# Patient Record
Sex: MALE | Race: WHITE | NOT HISPANIC OR LATINO | Employment: FULL TIME | URBAN - METROPOLITAN AREA
[De-identification: names, ages, dates, MRNs, and addresses within clinical notes are randomized per-mention and may not be internally consistent; named-entity substitution may affect disease eponyms.]

---

## 2018-08-01 ENCOUNTER — OFFICE VISIT (OUTPATIENT)
Dept: URGENT CARE | Facility: URGENT CARE | Age: 48
End: 2018-08-01
Payer: COMMERCIAL

## 2018-08-01 VITALS
HEART RATE: 81 BPM | SYSTOLIC BLOOD PRESSURE: 132 MMHG | DIASTOLIC BLOOD PRESSURE: 72 MMHG | TEMPERATURE: 98.7 F | OXYGEN SATURATION: 97 %

## 2018-08-01 DIAGNOSIS — G89.29 CHRONIC PAIN OF RIGHT KNEE: Primary | ICD-10-CM

## 2018-08-01 DIAGNOSIS — M25.561 CHRONIC PAIN OF RIGHT KNEE: Primary | ICD-10-CM

## 2018-08-01 PROCEDURE — 99203 OFFICE O/P NEW LOW 30 MIN: CPT | Performed by: PHYSICIAN ASSISTANT

## 2018-08-01 RX ORDER — TRAMADOL HYDROCHLORIDE 50 MG/1
50 TABLET ORAL AT BEDTIME
Qty: 10 TABLET | Refills: 0 | Status: SHIPPED | OUTPATIENT
Start: 2018-08-01 | End: 2018-08-01

## 2018-08-01 RX ORDER — HYDROCODONE BITARTRATE AND ACETAMINOPHEN 5; 325 MG/1; MG/1
1 TABLET ORAL AT BEDTIME
Qty: 12 TABLET | Refills: 0 | Status: SHIPPED | OUTPATIENT
Start: 2018-08-01 | End: 2019-12-16

## 2018-08-01 NOTE — MR AVS SNAPSHOT
"              After Visit Summary   2018    Kemal Rodriguez    MRN: [id number]           Patient Information     Date Of Birth          1970        Visit Information        Provider Department      2018 2:05 PM Gaston Lange PA-C [facility]        Today's Diagnoses     Chronic pain of right knee    -  1       Follow-ups after your visit        Who to contact     If you have questions or need follow up information about today's clinic visit or your schedule please contact [facility]RG[facility] directly at [phone].  Normal or non-critical lab and imaging results will be communicated to you by [employer], letter or phone within 4 business days after the clinic has received the results. If you do not hear from us within 7 days, please contact the clinic through [employer] or phone. If you have a critical or abnormal lab result, we will notify you by phone as soon as possible.  Submit refill requests through [employer] or call your pharmacy and they will forward the refill request to us. Please allow 3 business days for your refill to be completed.          Additional Information About Your Visit        MyChart Information     [employer] lets you send messages to your doctor, view your test results, renew your prescriptions, schedule appointments and more. To sign up, go to [email or web address] . Click on \"Log in\" on the left side of the screen, which will take you to the Welcome page. Then click on \"Sign up Now\" on the right side of the page.     You will be asked to enter the access code listed below, as well as some personal information. Please follow the directions to create your username and password.     Your access code is: [email or web address]  Expires: 10/30/2018  3:20 PM     Your access code will  in 90 days. If you need help or a new code, please call your [place] clinic or [phone].        Care EveryWhere ID     This is your Care EveryWhere ID. This could be " used by other organizations to access your [place] medical records  [id number]        Your Vitals Were     Pulse Temperature Pulse Oximetry             81 98.7  F (37.1  C) (Tympanic) 97%          Blood Pressure from Last 3 Encounters:   08/01/18 132/72   02/11/15 139/75    Weight from Last 3 Encounters:   No data found for Wt              Today, you had the following     No orders found for display         Today's Medication Changes          These changes are accurate as of 8/1/18  3:20 PM.  If you have any questions, ask your nurse or doctor.               Start taking these medicines.        Dose/Directions    order for DME   Used for:  Chronic pain of right knee        Equipment being ordered: knee sleeve   Quantity:  1 Device   Refills:  0         These medicines have changed or have updated prescriptions.        Dose/Directions    * HYDROcodone-acetaminophen 5-325 MG per tablet   Commonly known as:  NORCO   This may have changed:  Another medication with the same name was added. Make sure you understand how and when to take each.        Dose:  1-2 tablet   Take 1-2 tablets by mouth every 4 hours as needed for moderate to severe pain   Quantity:  20 tablet   Refills:  0       * HYDROcodone-acetaminophen 5-325 MG per tablet   Commonly known as:  NORCO   This may have changed:  You were already taking a medication with the same name, and this prescription was added. Make sure you understand how and when to take each.   Used for:  Chronic pain of right knee        Dose:  1 tablet   Take 1 tablet by mouth At Bedtime   Quantity:  12 tablet   Refills:  0       * Notice:  This list has 2 medication(s) that are the same as other medications prescribed for you. Read the directions carefully, and ask your doctor or other care provider to review them with you.         Where to get your medicines      Some of these will need a paper prescription and others can be bought over the counter.  Ask your nurse if you have  questions.     Bring a paper prescription for each of these medications     HYDROcodone-acetaminophen 5-325 MG per tablet    order for DME               Information about OPIOIDS     PRESCRIPTION OPIOIDS: WHAT YOU NEED TO KNOW   We gave you an opioid (narcotic) pain medicine. It is important to manage your pain, but opioids are not always the best choice. You should first try all the other options your care team gave you. Take this medicine for as short a time (and as few doses) as possible.     These medicines have risks:    DO NOT drive when on new or higher doses of pain medicine. These medicines can affect your alertness and reaction times, and you could be arrested for driving under the influence (DUI). If you need to use opioids long-term, talk to your care team about driving.    DO NOT operate heave machinery    DO NOT do any other dangerous activities while taking these medicines.     DO NOT drink any alcohol while taking these medicines.      If the opioid prescribed includes acetaminophen, DO NOT take with any other medicines that contain acetaminophen. Read all labels carefully. Look for the word  acetaminophen  or  Tylenol.  Ask your pharmacist if you have questions or are unsure.    You can get addicted to pain medicines, especially if you have a history of addiction (chemical, alcohol or substance dependence). Talk to your care team about ways to reduce this risk.    Store your pills in a secure place, locked if possible. We will not replace any lost or stolen medicine. If you don t finish your medicine, please throw away (dispose) as directed by your pharmacist. The [place] Pollution Control Agency has more information about safe disposal: https://www.pca.state.[place]/living-green/managing-unwanted-medications.     All opioids tend to cause constipation. Drink plenty of water and eat foods that have a lot of fiber, such as fruits, vegetables, prune juice, apple juice and high-fiber cereal. Take a  laxative (Miralax, milk of magnesia, Colace, Senna) if you don t move your bowels at least every other day.          Primary Care Provider Office Phone # Fax #    Juanita Sorenson, KEYUR [phone]       [place] [address] [address] [facility] [address]        Equal Access to Services     [facility]: Hadii aad ku hadasho Soomaali, waaxda luqadaha, qaybta kaalmada adeegyada, waxay idiin hayaan adeeg kharash la'aan . So [facility] [phone].    ATENCIÓN: Si habla español, tiene a dumont disposición servicios gratuitos de asistencia lingüística. Elenaame al [phone].    We comply with applicable federal civil rights laws and [place] laws. We do not discriminate on the basis of race, color, national origin, age, disability, sex, sexual orientation, or gender identity.            Thank you!     Thank you for choosing [facility] URGENT [facility]  for your care. Our goal is always to provide you with excellent care. Hearing back from our patients is one way we can continue to improve our services. Please take a few minutes to complete the written survey that you may receive in the mail after your visit with us. Thank you!             Your Updated Medication List - Protect others around you: Learn how to safely use, store and throw away your medicines at www.disposemymeds.org.          This list is accurate as of 8/1/18  3:20 PM.  Always use your most recent med list.                   Brand Name Dispense Instructions for use Diagnosis    atenolol 50 MG tablet    TENORMIN     Take 50 mg by mouth daily        * HYDROcodone-acetaminophen 5-325 MG per tablet    NORCO    20 tablet    Take 1-2 tablets by mouth every 4 hours as needed for moderate to severe pain        * HYDROcodone-acetaminophen 5-325 MG per tablet    NORCO    12 tablet    Take 1 tablet by mouth At Bedtime    Chronic pain of right knee       order for DME     1 Device    Equipment being ordered: knee sleeve    Chronic pain of right knee        TERAZOSIN HCL PO      Take 1 mg by mouth        * Notice:  This list has 2 medication(s) that are the same as other medications prescribed for you. Read the directions carefully, and ask your doctor or other care provider to review them with you.

## 2018-08-01 NOTE — PROGRESS NOTES
"SUBJECTIVE:  Chief Complaint   Patient presents with     Urgent Care     Musculoskeletal Problem     right knee has been bothering patient, did something on saturday     Kemal Rodriguez is a 48 year old male presents with a chief complaint of right acute on chronic pain.  Pain is exacerbated by work, asMr. Michael works as a , making short trips that requires frequently getting in and out of his truck.     Patient is unable to sleep due to pain.  Has follow up with Ortho on the 16th.  Medication from PCP (Tramadol) is ineffective.  Would like pain medication to get through the next several nights.     [place] PM is emoty with the exception of the Tramadol reported by the patient      No past medical history on file.  Current Outpatient Prescriptions   Medication Sig Dispense Refill     atenolol (TENORMIN) 50 MG tablet Take 50 mg by mouth daily       HYDROcodone-acetaminophen (NORCO) 5-325 MG per tablet Take 1 tablet by mouth At Bedtime 12 tablet 0     HYDROcodone-acetaminophen (NORCO) 5-325 MG per tablet Take 1-2 tablets by mouth every 4 hours as needed for moderate to severe pain 20 tablet 0     order for DME Equipment being ordered: knee sleeve 1 Device 0     TERAZOSIN HCL PO Take 1 mg by mouth       Social History   Substance Use Topics     Smoking status: Unknown If Ever Smoked     Smokeless tobacco: Current User      Comment: \"I Chew a lot\"     Alcohol use No       ROS:  Review of systems negative except as stated above.    EXAM:   /72 (BP Location: Right arm, Patient Position: Chair, Cuff Size: Adult Regular)  Pulse 81  Temp 98.7  F (37.1  C) (Tympanic)  SpO2 97%  Gen: healthy,alert,no distress  Extremity: knee has swelling, point tenderness at lateral and medial aspects and FROM.   There is not compromise to the distal circulation.  Pulses are +2 and CRT is brisk  GENERAL APPEARANCE: healthy, alert and no distress  CHEST: clear to auscultation  CV: regular rate and " rhythm  EXTREMITIES: peripheral pulses normal  SKIN: no suspicious lesions or rashes  NEURO: Normal strength and tone, sensory exam grossly normal, mentation intact and speech normal    X-RAY was not done.    ASSESSMENT:   (M25.561,  G89.29) Chronic pain of right knee  (primary encounter diagnosis)  Plan: HYDROcodone-acetaminophen (NORCO) 5-325 MG per         tablet, order for DME (knee sleeve)  Follow up as scheduled

## 2019-12-16 ENCOUNTER — APPOINTMENT (OUTPATIENT)
Dept: ULTRASOUND IMAGING | Facility: CLINIC | Age: 49
End: 2019-12-16
Attending: EMERGENCY MEDICINE
Payer: COMMERCIAL

## 2019-12-16 ENCOUNTER — HOSPITAL ENCOUNTER (EMERGENCY)
Facility: CLINIC | Age: 49
Discharge: HOME | End: 2019-12-16
Attending: EMERGENCY MEDICINE | Admitting: EMERGENCY MEDICINE
Payer: COMMERCIAL

## 2019-12-16 VITALS
HEART RATE: 66 BPM | DIASTOLIC BLOOD PRESSURE: 85 MMHG | WEIGHT: 265 LBS | BODY MASS INDEX: 35.89 KG/M2 | OXYGEN SATURATION: 99 % | RESPIRATION RATE: 20 BRPM | SYSTOLIC BLOOD PRESSURE: 136 MMHG | TEMPERATURE: 98.4 F | HEIGHT: 72 IN

## 2019-12-16 DIAGNOSIS — K80.50 BILIARY COLIC: ICD-10-CM

## 2019-12-16 LAB
ALBUMIN SERPL-MCNC: 3.9 G/DL (ref 3.4–5)
ALP SERPL-CCNC: 96 U/L (ref 40–150)
ALT SERPL W P-5'-P-CCNC: 209 U/L (ref 0–70)
ANION GAP SERPL CALCULATED.3IONS-SCNC: 4 MMOL/L (ref 3–14)
AST SERPL W P-5'-P-CCNC: 253 U/L (ref 0–45)
BASOPHILS # BLD AUTO: 0.1 10E9/L (ref 0–0.2)
BASOPHILS NFR BLD AUTO: 0.9 %
BILIRUB SERPL-MCNC: 7.5 MG/DL (ref 0.2–1.3)
BUN SERPL-MCNC: 8 MG/DL (ref 7–30)
CALCIUM SERPL-MCNC: 8.5 MG/DL (ref 8.5–10.1)
CHLORIDE SERPL-SCNC: 106 MMOL/L (ref 94–109)
CO2 SERPL-SCNC: 27 MMOL/L (ref 20–32)
CREAT SERPL-MCNC: 0.76 MG/DL (ref 0.66–1.25)
DIFFERENTIAL METHOD BLD: ABNORMAL
EOSINOPHIL # BLD AUTO: 0.1 10E9/L (ref 0–0.7)
EOSINOPHIL NFR BLD AUTO: 0.8 %
ERYTHROCYTE [DISTWIDTH] IN BLOOD BY AUTOMATED COUNT: 10.8 % (ref 10–15)
GFR SERPL CREATININE-BSD FRML MDRD: >90 ML/MIN/{1.73_M2}
GLUCOSE SERPL-MCNC: 118 MG/DL (ref 70–99)
HCT VFR BLD AUTO: 34.2 % (ref 40–53)
HGB BLD-MCNC: 10.7 G/DL (ref 13.3–17.7)
IMM GRANULOCYTES # BLD: 0.1 10E9/L (ref 0–0.4)
IMM GRANULOCYTES NFR BLD: 0.8 %
LIPASE SERPL-CCNC: 151 U/L (ref 73–393)
LYMPHOCYTES # BLD AUTO: 0.8 10E9/L (ref 0.8–5.3)
LYMPHOCYTES NFR BLD AUTO: 9.3 %
MCH RBC QN AUTO: 33.9 PG (ref 26.5–33)
MCHC RBC AUTO-ENTMCNC: 31.3 G/DL (ref 31.5–36.5)
MCV RBC AUTO: 108 FL (ref 78–100)
MONOCYTES # BLD AUTO: 0.6 10E9/L (ref 0–1.3)
MONOCYTES NFR BLD AUTO: 7.2 %
NEUTROPHILS # BLD AUTO: 7.1 10E9/L (ref 1.6–8.3)
NEUTROPHILS NFR BLD AUTO: 81 %
NRBC # BLD AUTO: 0 10*3/UL
NRBC BLD AUTO-RTO: 0 /100
PLATELET # BLD AUTO: 310 10E9/L (ref 150–450)
POTASSIUM SERPL-SCNC: 3.9 MMOL/L (ref 3.4–5.3)
PROT SERPL-MCNC: 7 G/DL (ref 6.8–8.8)
RBC # BLD AUTO: 3.16 10E12/L (ref 4.4–5.9)
SODIUM SERPL-SCNC: 137 MMOL/L (ref 133–144)
WBC # BLD AUTO: 8.8 10E9/L (ref 4–11)

## 2019-12-16 PROCEDURE — 96376 TX/PRO/DX INJ SAME DRUG ADON: CPT

## 2019-12-16 PROCEDURE — 80053 COMPREHEN METABOLIC PANEL: CPT | Performed by: EMERGENCY MEDICINE

## 2019-12-16 PROCEDURE — 36415 COLL VENOUS BLD VENIPUNCTURE: CPT | Performed by: EMERGENCY MEDICINE

## 2019-12-16 PROCEDURE — 83690 ASSAY OF LIPASE: CPT | Performed by: EMERGENCY MEDICINE

## 2019-12-16 PROCEDURE — 96375 TX/PRO/DX INJ NEW DRUG ADDON: CPT

## 2019-12-16 PROCEDURE — 85025 COMPLETE CBC W/AUTO DIFF WBC: CPT | Performed by: EMERGENCY MEDICINE

## 2019-12-16 PROCEDURE — 99285 EMERGENCY DEPT VISIT HI MDM: CPT | Mod: 25

## 2019-12-16 PROCEDURE — 76705 ECHO EXAM OF ABDOMEN: CPT

## 2019-12-16 PROCEDURE — 96374 THER/PROPH/DIAG INJ IV PUSH: CPT

## 2019-12-16 PROCEDURE — 25000128 H RX IP 250 OP 636: Performed by: EMERGENCY MEDICINE

## 2019-12-16 RX ORDER — HYDROCODONE BITARTRATE AND ACETAMINOPHEN 5; 325 MG/1; MG/1
1-2 TABLET ORAL EVERY 6 HOURS PRN
Qty: 15 TABLET | Refills: 0 | Status: ON HOLD | OUTPATIENT
Start: 2019-12-16 | End: 2021-01-14

## 2019-12-16 RX ORDER — DILTIAZEM HYDROCHLORIDE 240 MG/1
240 CAPSULE, COATED, EXTENDED RELEASE ORAL DAILY
Status: ON HOLD | COMMUNITY
Start: 2019-02-04 | End: 2021-01-14

## 2019-12-16 RX ORDER — ATORVASTATIN CALCIUM 40 MG/1
40 TABLET, FILM COATED ORAL AT BEDTIME
COMMUNITY
Start: 2019-10-15

## 2019-12-16 RX ORDER — OMEGA-3 FATTY ACIDS/FISH OIL 300-1000MG
1 CAPSULE ORAL 2 TIMES DAILY
COMMUNITY

## 2019-12-16 RX ORDER — HYDROMORPHONE HYDROCHLORIDE 1 MG/ML
0.5 INJECTION, SOLUTION INTRAMUSCULAR; INTRAVENOUS; SUBCUTANEOUS
Status: COMPLETED | OUTPATIENT
Start: 2019-12-16 | End: 2019-12-16

## 2019-12-16 RX ORDER — ISOSORBIDE MONONITRATE 30 MG/1
90 TABLET, EXTENDED RELEASE ORAL DAILY
Status: ON HOLD | COMMUNITY
Start: 2019-01-10 | End: 2021-01-14

## 2019-12-16 RX ORDER — SERTRALINE HYDROCHLORIDE 100 MG/1
200 TABLET, FILM COATED ORAL AT BEDTIME
Status: ON HOLD | COMMUNITY
Start: 2019-11-25 | End: 2021-01-14

## 2019-12-16 RX ORDER — TERAZOSIN 1 MG/1
CAPSULE ORAL
Status: ON HOLD | COMMUNITY
Start: 2019-10-15 | End: 2021-01-14

## 2019-12-16 RX ORDER — AMLODIPINE BESYLATE 10 MG/1
TABLET ORAL
Status: ON HOLD | COMMUNITY
Start: 2018-12-05 | End: 2021-01-14

## 2019-12-16 RX ORDER — DIPHENOXYLATE HYDROCHLORIDE AND ATROPINE SULFATE 2.5; .025 MG/1; MG/1
1 TABLET ORAL DAILY
COMMUNITY

## 2019-12-16 RX ORDER — KETOROLAC TROMETHAMINE 15 MG/ML
15 INJECTION, SOLUTION INTRAMUSCULAR; INTRAVENOUS ONCE
Status: COMPLETED | OUTPATIENT
Start: 2019-12-16 | End: 2019-12-16

## 2019-12-16 RX ADMIN — HYDROMORPHONE HYDROCHLORIDE 0.5 MG: 1 INJECTION, SOLUTION INTRAMUSCULAR; INTRAVENOUS; SUBCUTANEOUS at 08:01

## 2019-12-16 RX ADMIN — HYDROMORPHONE HYDROCHLORIDE 0.5 MG: 1 INJECTION, SOLUTION INTRAMUSCULAR; INTRAVENOUS; SUBCUTANEOUS at 09:55

## 2019-12-16 RX ADMIN — KETOROLAC TROMETHAMINE 15 MG: 15 INJECTION, SOLUTION INTRAMUSCULAR; INTRAVENOUS at 08:02

## 2019-12-16 RX ADMIN — HYDROMORPHONE HYDROCHLORIDE 0.5 MG: 1 INJECTION, SOLUTION INTRAMUSCULAR; INTRAVENOUS; SUBCUTANEOUS at 09:08

## 2019-12-16 SDOH — HEALTH STABILITY: MENTAL HEALTH: HOW OFTEN DO YOU HAVE A DRINK CONTAINING ALCOHOL?: NEVER

## 2019-12-16 ASSESSMENT — ENCOUNTER SYMPTOMS
FEVER: 0
BLOOD IN STOOL: 0
SHORTNESS OF BREATH: 1
NAUSEA: 0
ABDOMINAL PAIN: 1
VOMITING: 0

## 2019-12-16 ASSESSMENT — MIFFLIN-ST. JEOR: SCORE: 2100.03

## 2019-12-16 NOTE — ED TRIAGE NOTES
C/o RUQ pain since 1600 yesterday. Reports soft stools, no diarrhea, no nausea, no emesis. States he had a similar episode 1 month ago, got zantac. He took zantac today without relief.

## 2019-12-16 NOTE — ED AVS SNAPSHOT
[facility] [place] [facility] Emergency Department  [address]  [facility] [address]  Phone:  [phone]  Fax:  [phone]                                    Kemal Rodriguez   MRN: [id number]    Department:  [facility] [place] [facility] Emergency Department   Date of Visit:  12/16/2019           After Visit Summary Signature Page    I have received my discharge instructions, and my questions have been answered. I have discussed any challenges I see with this plan with the nurse or doctor.    ..........................................................................................................................................  Patient/Patient Representative Signature      ..........................................................................................................................................  Patient Representative Print Name and Relationship to Patient    ..................................................               ................................................  Date                                   Time    ..........................................................................................................................................  Reviewed by Signature/Title    ...................................................              ..............................................  Date                                               Time          22EPIC Rev 08/18

## 2019-12-16 NOTE — ED PROVIDER NOTES
History     Chief Complaint:    Abdominal Pain      The history is provided by the patient.      Kemal Rodriguez is a 49 year old male with a history of BPH and hypertension who presents with localized right sided abdominal pain that onset yesterday at 1600 three hours after eating. He woke up this morning and the pain was unbearable prompting his presentation. He states it also hurts to breathe and laying on his back worsens the pain. A month ago, the patient had stomach pain in the same location but it wasn't as severe. He was started on Zantac at this time which he reports provides no relief now. The patient denies chest pain, fever, nausea, vomiting, or black or bloody stools. The patient has had three abdominal hernia repairs but still has his gall bladder and appendix.       Allergies:  Penicillins  Sulfa Drugs    Medications:    Tenormin  Norco  Terazosin HCl  Zantac  Zoloft  Lipitor  Imdur    Past Medical History:    Hypertension  BPH  Anemia  Spasm coronary artery  SHELBY  Osteoarthritis right knee  Deficiency glucose 6 phosphate dehydrogenase  Vertigo    Past Surgical History:    Hernia repair   Right total knee arthroplasty replacement    Family History:    Brother: colitis, depression  Father: congenital heart disease, CAD, hypertension  Mother: COPD, lung cancer, mental health problem    Social History:  Presents to the ED with his boss at the bedside  Tobacco Use: smokeless, chew  Alcohol Use: no  Drug Use: no  Marital Status:   [2]     Review of Systems   Constitutional: Negative for fever.   Respiratory: Positive for shortness of breath.    Cardiovascular: Negative for chest pain.   Gastrointestinal: Positive for abdominal pain. Negative for blood in stool, nausea and vomiting.   All other systems reviewed and are negative.    Physical Exam     Patient Vitals for the past 24 hrs:   BP Temp Temp src Pulse Resp SpO2 Height Weight   12/16/19 0930 139/80 -- -- -- -- 100 % -- --   12/16/19  0900 116/66 -- -- 68 -- 98 % -- --   12/16/19 0845 118/56 -- -- -- -- -- -- --   12/16/19 0807 137/73 -- -- 70 -- 98 % -- --   12/16/19 0736 (!) 151/80 98.4  F (36.9  C) Oral 75 20 99 % 1.829 m (6') 120.2 kg (265 lb)       Physical Exam  General: Alert, in acute distress 2/2 abdominal pain; nontoxic appearing  HEENT:  Moist mucous membranes.  Conjunctiva normal.   CV:  RRR, no m/r/g, skin warm and well perfused  Pulm:  CTAB, no wheezes/ronchi/rales.  No acute distress, breathing comfortably  GI:  Soft, RUQ tenderness, nondistended.  No rebound or guarding.  Normal bowel sounds  MSK:  Moving all extremities.  No focal areas of edema, erythema, or tenderness  Skin:  WWP, no rashes, no lower extremity edema, skin color normal, no diaphoresis  Psych:  Well-appearing, normal affect, regular speech      Emergency Department Course     Imaging:  Radiology findings were communicated with the patient who voiced understanding of the findings.    US Abdomen Limited:  1. Borderline hepatomegaly with diffuse fatty infiltration throughout  the liver.  2. Dependent cholelithiasis and/or sludge in gallbladder lumen, though  no complicating features such as gallbladder wall thickness or  pericholecystic fluid.    Reading as per radiology.     Laboratory:  Laboratory findings were communicated with the patient who voiced understanding of the findings.    CBC: WBC: 8.8, HGB: 10.7 (L), PLT: 310  CMP: Glucose 118 (H), Bilirubin Total 7.5 (H),  (H),  (H) o/w WNL (Creatinine 0.76)  Lipase: 151    Interventions:  0801 Toradol 15 mg IV  0802 Dilaudid 0.5 mg IV   0908 Dilaudid 0.5 mg IV    Emergency Department Course:  Past medical records, nursing notes, and vitals reviewed.    IV was inserted and blood was drawn for laboratory testing, results above.  The patient was sent for a US while in the emergency department, results above.     0743: I performed an exam of the patient as documented above.   1007: Patient rechecked and  updated.      Findings and plan explained to the Patient. Patient discharged home with instructions regarding supportive care, medications, and reasons to return. The importance of close follow-up was reviewed. The patient was prescribed Norco.    I personally reviewed the laboratory and imaging results with the Patient and answered all related questions prior to discharge.      Impression & Plan     Medical Decision Making:  Kemal Rodriguez is a 49 year old male who presents to the ER for evaluation of right upper quadrant abdominal pain as noted in HPI.  He is hemodynamically stable and afebrile.  He denies any chest pain.  He has right upper quadrant tenderness on exam without any peritoneal signs.  No lower abdominal tenderness to suggest appendicitis or worrisome findings on exam to suggest perforated viscus.  I did consider cholelithiasis, cholecystitis, hepatobiliary pathology, pancreatitis, PUD, gastritis amongst others.  Doubt ACS given no chest pain.  Signs and symptoms not consistent with PE.  Lab studies are unrevealing including liver studies.  Ultrasound does show sludge/gallstones but normal common bile duct diameter.  Symptoms improved with the above interventions.  Given his otherwise well appearance after pain controlled, I feel he is safe for discharge to follow-up with surgery as an outpatient basis to discuss further management of his gallstones which will likely include elective cholecystectomy.  He was comfortable with this plan.  He will be sent home with short course of pain medicine after risks and benefits of this were discussed.  Reasons to return to the ER were discussed and all questions were answered.    Discharge Diagnosis:    ICD-10-CM    1. Biliary colic K80.50        Disposition:  Discharged home.     Discharge Medications:  New Prescriptions    HYDROCODONE-ACETAMINOPHEN (NORCO) 5-325 MG TABLET    Take 1-2 tablets by mouth every 6 hours as needed for breakthrough pain or  severe pain     Scribe Disclosure:  I, Debbie Chan, am serving as a scribe at 7:35 AM on 12/16/2019 to document services personally performed by Flo Das MD based on my observations and the provider's statements to me.     12/16/2019   [facility] EMERGENCY DEPARTMENT       Flo Das MD  12/16/19 0076

## 2019-12-17 ENCOUNTER — HOSPITAL ENCOUNTER (OUTPATIENT)
Facility: CLINIC | Age: 49
Setting detail: OBSERVATION
Discharge: HOME | End: 2019-12-18
Attending: EMERGENCY MEDICINE | Admitting: INTERNAL MEDICINE
Payer: COMMERCIAL

## 2019-12-17 ENCOUNTER — OFFICE VISIT (OUTPATIENT)
Dept: SURGERY | Facility: CLINIC | Age: 49
End: 2019-12-17
Payer: COMMERCIAL

## 2019-12-17 ENCOUNTER — APPOINTMENT (OUTPATIENT)
Dept: CT IMAGING | Facility: CLINIC | Age: 49
End: 2019-12-17
Attending: EMERGENCY MEDICINE
Payer: COMMERCIAL

## 2019-12-17 ENCOUNTER — APPOINTMENT (OUTPATIENT)
Dept: MRI IMAGING | Facility: CLINIC | Age: 49
End: 2019-12-17
Attending: PHYSICIAN ASSISTANT
Payer: COMMERCIAL

## 2019-12-17 VITALS
HEART RATE: 67 BPM | WEIGHT: 265 LBS | HEIGHT: 72 IN | OXYGEN SATURATION: 96 % | SYSTOLIC BLOOD PRESSURE: 130 MMHG | RESPIRATION RATE: 16 BRPM | DIASTOLIC BLOOD PRESSURE: 86 MMHG | BODY MASS INDEX: 35.89 KG/M2

## 2019-12-17 DIAGNOSIS — K80.21 CALCULUS OF GALLBLADDER WITH BILIARY OBSTRUCTION BUT WITHOUT CHOLECYSTITIS: Primary | ICD-10-CM

## 2019-12-17 DIAGNOSIS — K80.50 BILIARY COLIC: ICD-10-CM

## 2019-12-17 LAB
ALBUMIN SERPL-MCNC: 4.2 G/DL (ref 3.4–5)
ALP SERPL-CCNC: 93 U/L (ref 40–150)
ALT SERPL W P-5'-P-CCNC: 186 U/L (ref 0–70)
ANION GAP SERPL CALCULATED.3IONS-SCNC: 7 MMOL/L (ref 3–14)
AST SERPL W P-5'-P-CCNC: 79 U/L (ref 0–45)
BASOPHILS # BLD AUTO: 0.1 10E9/L (ref 0–0.2)
BASOPHILS NFR BLD AUTO: 1.1 %
BILIRUB SERPL-MCNC: 1.8 MG/DL (ref 0.2–1.3)
BUN SERPL-MCNC: 9 MG/DL (ref 7–30)
CALCIUM SERPL-MCNC: 9 MG/DL (ref 8.5–10.1)
CHLORIDE SERPL-SCNC: 104 MMOL/L (ref 94–109)
CO2 SERPL-SCNC: 27 MMOL/L (ref 20–32)
CREAT SERPL-MCNC: 0.81 MG/DL (ref 0.66–1.25)
DIFFERENTIAL METHOD BLD: ABNORMAL
EOSINOPHIL # BLD AUTO: 0.3 10E9/L (ref 0–0.7)
EOSINOPHIL NFR BLD AUTO: 3.3 %
ERYTHROCYTE [DISTWIDTH] IN BLOOD BY AUTOMATED COUNT: 11 % (ref 10–15)
GFR SERPL CREATININE-BSD FRML MDRD: >90 ML/MIN/{1.73_M2}
GLUCOSE SERPL-MCNC: 82 MG/DL (ref 70–99)
HCT VFR BLD AUTO: 34.6 % (ref 40–53)
HGB BLD-MCNC: 10.8 G/DL (ref 13.3–17.7)
IMM GRANULOCYTES # BLD: 0.1 10E9/L (ref 0–0.4)
IMM GRANULOCYTES NFR BLD: 1 %
LIPASE SERPL-CCNC: 133 U/L (ref 73–393)
LYMPHOCYTES # BLD AUTO: 2.1 10E9/L (ref 0.8–5.3)
LYMPHOCYTES NFR BLD AUTO: 25.2 %
MCH RBC QN AUTO: 33.6 PG (ref 26.5–33)
MCHC RBC AUTO-ENTMCNC: 31.2 G/DL (ref 31.5–36.5)
MCV RBC AUTO: 108 FL (ref 78–100)
MONOCYTES # BLD AUTO: 0.5 10E9/L (ref 0–1.3)
MONOCYTES NFR BLD AUTO: 6.4 %
NEUTROPHILS # BLD AUTO: 5.3 10E9/L (ref 1.6–8.3)
NEUTROPHILS NFR BLD AUTO: 63 %
NRBC # BLD AUTO: 0 10*3/UL
NRBC BLD AUTO-RTO: 0 /100
PLATELET # BLD AUTO: 339 10E9/L (ref 150–450)
POTASSIUM SERPL-SCNC: 3.4 MMOL/L (ref 3.4–5.3)
PROT SERPL-MCNC: 7.3 G/DL (ref 6.8–8.8)
RBC # BLD AUTO: 3.21 10E12/L (ref 4.4–5.9)
SODIUM SERPL-SCNC: 138 MMOL/L (ref 133–144)
WBC # BLD AUTO: 8.4 10E9/L (ref 4–11)

## 2019-12-17 PROCEDURE — 85025 COMPLETE CBC W/AUTO DIFF WBC: CPT | Performed by: EMERGENCY MEDICINE

## 2019-12-17 PROCEDURE — 80053 COMPREHEN METABOLIC PANEL: CPT | Performed by: EMERGENCY MEDICINE

## 2019-12-17 PROCEDURE — G0378 HOSPITAL OBSERVATION PER HR: HCPCS

## 2019-12-17 PROCEDURE — 99220 ZZC INITIAL OBSERVATION CARE,LEVL III: CPT | Performed by: PHYSICIAN ASSISTANT

## 2019-12-17 PROCEDURE — 83690 ASSAY OF LIPASE: CPT | Performed by: EMERGENCY MEDICINE

## 2019-12-17 PROCEDURE — 96361 HYDRATE IV INFUSION ADD-ON: CPT

## 2019-12-17 PROCEDURE — 25000132 ZZH RX MED GY IP 250 OP 250 PS 637: Performed by: PHYSICIAN ASSISTANT

## 2019-12-17 PROCEDURE — 74177 CT ABD & PELVIS W/CONTRAST: CPT

## 2019-12-17 PROCEDURE — 99204 OFFICE O/P NEW MOD 45 MIN: CPT | Performed by: SURGERY

## 2019-12-17 PROCEDURE — 25800030 ZZH RX IP 258 OP 636: Performed by: PHYSICIAN ASSISTANT

## 2019-12-17 PROCEDURE — 25000128 H RX IP 250 OP 636: Performed by: EMERGENCY MEDICINE

## 2019-12-17 PROCEDURE — A9585 GADOBUTROL INJECTION: HCPCS | Performed by: INTERNAL MEDICINE

## 2019-12-17 PROCEDURE — 99285 EMERGENCY DEPT VISIT HI MDM: CPT | Mod: 25

## 2019-12-17 PROCEDURE — 25500064 ZZH RX 255 OP 636: Performed by: INTERNAL MEDICINE

## 2019-12-17 PROCEDURE — 96374 THER/PROPH/DIAG INJ IV PUSH: CPT

## 2019-12-17 PROCEDURE — 96376 TX/PRO/DX INJ SAME DRUG ADON: CPT | Mod: 59

## 2019-12-17 PROCEDURE — 74183 MRI ABD W/O CNTR FLWD CNTR: CPT

## 2019-12-17 PROCEDURE — 96375 TX/PRO/DX INJ NEW DRUG ADDON: CPT | Mod: 59

## 2019-12-17 PROCEDURE — 25800030 ZZH RX IP 258 OP 636: Performed by: EMERGENCY MEDICINE

## 2019-12-17 RX ORDER — NICOTINE 21 MG/24HR
1 PATCH, TRANSDERMAL 24 HOURS TRANSDERMAL EVERY 24 HOURS
COMMUNITY
End: 2023-07-10

## 2019-12-17 RX ORDER — HYDROCODONE BITARTRATE AND ACETAMINOPHEN 5; 325 MG/1; MG/1
1-2 TABLET ORAL EVERY 4 HOURS PRN
Status: DISCONTINUED | OUTPATIENT
Start: 2019-12-17 | End: 2019-12-18 | Stop reason: HOSPADM

## 2019-12-17 RX ORDER — ACETAMINOPHEN 325 MG/1
650 TABLET ORAL EVERY 4 HOURS PRN
Status: DISCONTINUED | OUTPATIENT
Start: 2019-12-17 | End: 2019-12-18 | Stop reason: HOSPADM

## 2019-12-17 RX ORDER — ATORVASTATIN CALCIUM 40 MG/1
40 TABLET, FILM COATED ORAL AT BEDTIME
Status: DISCONTINUED | OUTPATIENT
Start: 2019-12-17 | End: 2019-12-18 | Stop reason: HOSPADM

## 2019-12-17 RX ORDER — TERAZOSIN 1 MG/1
1 CAPSULE ORAL AT BEDTIME
Status: DISCONTINUED | OUTPATIENT
Start: 2019-12-17 | End: 2019-12-18 | Stop reason: HOSPADM

## 2019-12-17 RX ORDER — ONDANSETRON 2 MG/ML
4 INJECTION INTRAMUSCULAR; INTRAVENOUS EVERY 30 MIN PRN
Status: DISCONTINUED | OUTPATIENT
Start: 2019-12-17 | End: 2019-12-17

## 2019-12-17 RX ORDER — AMOXICILLIN 250 MG
2 CAPSULE ORAL 2 TIMES DAILY PRN
Status: DISCONTINUED | OUTPATIENT
Start: 2019-12-17 | End: 2019-12-18 | Stop reason: HOSPADM

## 2019-12-17 RX ORDER — SERTRALINE HYDROCHLORIDE 100 MG/1
200 TABLET, FILM COATED ORAL AT BEDTIME
Status: DISCONTINUED | OUTPATIENT
Start: 2019-12-17 | End: 2019-12-18 | Stop reason: HOSPADM

## 2019-12-17 RX ORDER — ONDANSETRON 4 MG/1
4 TABLET, ORALLY DISINTEGRATING ORAL EVERY 6 HOURS PRN
Status: DISCONTINUED | OUTPATIENT
Start: 2019-12-17 | End: 2019-12-18 | Stop reason: HOSPADM

## 2019-12-17 RX ORDER — POLYETHYLENE GLYCOL 3350 17 G/17G
17 POWDER, FOR SOLUTION ORAL DAILY PRN
Status: DISCONTINUED | OUTPATIENT
Start: 2019-12-17 | End: 2019-12-18 | Stop reason: HOSPADM

## 2019-12-17 RX ORDER — IOPAMIDOL 755 MG/ML
500 INJECTION, SOLUTION INTRAVASCULAR ONCE
Status: COMPLETED | OUTPATIENT
Start: 2019-12-17 | End: 2019-12-17

## 2019-12-17 RX ORDER — GADOBUTROL 604.72 MG/ML
10 INJECTION INTRAVENOUS ONCE
Status: COMPLETED | OUTPATIENT
Start: 2019-12-17 | End: 2019-12-17

## 2019-12-17 RX ORDER — AMOXICILLIN 250 MG
1 CAPSULE ORAL 2 TIMES DAILY PRN
Status: DISCONTINUED | OUTPATIENT
Start: 2019-12-17 | End: 2019-12-18 | Stop reason: HOSPADM

## 2019-12-17 RX ORDER — DILTIAZEM HYDROCHLORIDE 240 MG/1
240 CAPSULE, COATED, EXTENDED RELEASE ORAL DAILY
Status: DISCONTINUED | OUTPATIENT
Start: 2019-12-18 | End: 2019-12-18 | Stop reason: HOSPADM

## 2019-12-17 RX ORDER — MORPHINE SULFATE 4 MG/ML
4 INJECTION, SOLUTION INTRAMUSCULAR; INTRAVENOUS
Status: DISCONTINUED | OUTPATIENT
Start: 2019-12-17 | End: 2019-12-17

## 2019-12-17 RX ORDER — NALOXONE HYDROCHLORIDE 0.4 MG/ML
.1-.4 INJECTION, SOLUTION INTRAMUSCULAR; INTRAVENOUS; SUBCUTANEOUS
Status: DISCONTINUED | OUTPATIENT
Start: 2019-12-17 | End: 2019-12-18 | Stop reason: HOSPADM

## 2019-12-17 RX ORDER — ONDANSETRON 2 MG/ML
4 INJECTION INTRAMUSCULAR; INTRAVENOUS EVERY 6 HOURS PRN
Status: DISCONTINUED | OUTPATIENT
Start: 2019-12-17 | End: 2019-12-18 | Stop reason: HOSPADM

## 2019-12-17 RX ORDER — SODIUM CHLORIDE 9 MG/ML
1000 INJECTION, SOLUTION INTRAVENOUS CONTINUOUS
Status: DISCONTINUED | OUTPATIENT
Start: 2019-12-17 | End: 2019-12-17

## 2019-12-17 RX ORDER — ATENOLOL 50 MG/1
50 TABLET ORAL DAILY
Status: DISCONTINUED | OUTPATIENT
Start: 2019-12-18 | End: 2019-12-18 | Stop reason: HOSPADM

## 2019-12-17 RX ORDER — LIDOCAINE 40 MG/G
CREAM TOPICAL
Status: DISCONTINUED | OUTPATIENT
Start: 2019-12-17 | End: 2019-12-18 | Stop reason: HOSPADM

## 2019-12-17 RX ORDER — HYDROMORPHONE HYDROCHLORIDE 1 MG/ML
0.5 INJECTION, SOLUTION INTRAMUSCULAR; INTRAVENOUS; SUBCUTANEOUS
Status: DISCONTINUED | OUTPATIENT
Start: 2019-12-17 | End: 2019-12-18 | Stop reason: HOSPADM

## 2019-12-17 RX ORDER — SODIUM CHLORIDE 9 MG/ML
INJECTION, SOLUTION INTRAVENOUS CONTINUOUS
Status: DISCONTINUED | OUTPATIENT
Start: 2019-12-17 | End: 2019-12-18 | Stop reason: HOSPADM

## 2019-12-17 RX ORDER — ASPIRIN 81 MG/1
81 TABLET ORAL AT BEDTIME
COMMUNITY

## 2019-12-17 RX ADMIN — MORPHINE SULFATE 4 MG: 4 INJECTION INTRAVENOUS at 13:38

## 2019-12-17 RX ADMIN — MORPHINE SULFATE 4 MG: 4 INJECTION INTRAVENOUS at 14:52

## 2019-12-17 RX ADMIN — TERAZOSIN HYDROCHLORIDE ANHYDROUS 1 MG: 1 CAPSULE ORAL at 21:30

## 2019-12-17 RX ADMIN — SODIUM CHLORIDE 1000 ML: 9 INJECTION, SOLUTION INTRAVENOUS at 13:37

## 2019-12-17 RX ADMIN — HYDROCODONE BITARTRATE AND ACETAMINOPHEN 2 TABLET: 5; 325 TABLET ORAL at 21:30

## 2019-12-17 RX ADMIN — ATORVASTATIN CALCIUM 40 MG: 40 TABLET, FILM COATED ORAL at 21:30

## 2019-12-17 RX ADMIN — IOPAMIDOL 100 ML: 755 INJECTION, SOLUTION INTRAVENOUS at 14:12

## 2019-12-17 RX ADMIN — ONDANSETRON HYDROCHLORIDE 4 MG: 2 INJECTION, SOLUTION INTRAMUSCULAR; INTRAVENOUS at 13:38

## 2019-12-17 RX ADMIN — SERTRALINE HYDROCHLORIDE 200 MG: 100 TABLET ORAL at 21:30

## 2019-12-17 RX ADMIN — SODIUM CHLORIDE: 9 INJECTION, SOLUTION INTRAVENOUS at 20:13

## 2019-12-17 RX ADMIN — GADOBUTROL 10 ML: 604.72 INJECTION INTRAVENOUS at 19:33

## 2019-12-17 RX ADMIN — HYDROCODONE BITARTRATE AND ACETAMINOPHEN 2 TABLET: 5; 325 TABLET ORAL at 17:19

## 2019-12-17 ASSESSMENT — ENCOUNTER SYMPTOMS
BRUISES/BLEEDS EASILY: 0
VOMITING: 0
ABDOMINAL PAIN: 1
SHORTNESS OF BREATH: 0

## 2019-12-17 ASSESSMENT — MIFFLIN-ST. JEOR
SCORE: 2100.03
SCORE: 2049.23

## 2019-12-17 NOTE — ED NOTES
[facility]  ED Nurse Handoff Report    Kemal Rodriguez is a 49 year old male   ED Chief complaint: Abnormal Labs  . ED Diagnosis:   Final diagnoses:   Biliary colic     Allergies:   Allergies   Allergen Reactions     Quinine GI Disturbance and Nausea and Vomiting     Nausea & Vomiting       Penicillins      Sulfa Drugs        Code Status: Full Code  Activity level - Baseline/Home:  Independent. Activity Level - Current:   Stand by Assist. Lift room needed: No. Bariatric: No   Needed: No   Isolation: No. Infection: Not Applicable.     Vital Signs:   Vitals:    12/17/19 1342 12/17/19 1500 12/17/19 1515 12/17/19 1530   BP:  137/81  (!) 139/91   Pulse: 63 67  62   Resp:       Temp:       TempSrc:       SpO2: 98%  96% 97%       Cardiac Rhythm:  ,      Pain level: 0-10 Pain Scale: 6  Patient confused: No. Patient Falls Risk: Yes.   Elimination Status: Has voided   Patient Report - Initial Complaint: Abdominal Pain. Focused Assessment: RUQ abdominal pain, nausea. Bili elevated yesterday at 7.5. Sent here from surgical office for eval.   Tests Performed: Labs, CT. Abnormal Results:   Labs Ordered and Resulted from Time of ED Arrival Up to the Time of Departure from the ED   CBC WITH PLATELETS DIFFERENTIAL - Abnormal; Notable for the following components:       Result Value    RBC Count 3.21 (*)     Hemoglobin 10.8 (*)     Hematocrit 34.6 (*)      (*)     MCH 33.6 (*)     MCHC 31.2 (*)     All other components within normal limits   COMPREHENSIVE METABOLIC PANEL - Abnormal; Notable for the following components:    Bilirubin Total 1.8 (*)      (*)     AST 79 (*)     All other components within normal limits   LIPASE     CT Abdomen Pelvis w Contrast   Final Result   IMPRESSION:   1. Fatty infiltration of the liver.   2. Low-attenuation liver lesions are poorly characterized on this   study but likely represent hemangiomas. Consider confirmation with   hepatic MRI.   3.  Cholelithiasis.      ALEE HARRISON MD        .   Treatments provided: See MAR  Family Comments:   OBS brochure/video discussed/provided to patient:  Yes  ED Medications:   Medications   0.9% sodium chloride BOLUS (0 mLs Intravenous Stopped 12/17/19 1448)     Followed by   sodium chloride 0.9% infusion (has no administration in time range)   morphine (PF) injection 4 mg (4 mg Intravenous Given 12/17/19 1452)   ondansetron (ZOFRAN) injection 4 mg (4 mg Intravenous Given 12/17/19 1338)   0.9% sodium chloride BOLUS (0 mLs Intravenous Stopped 12/17/19 1413)   iopamidol (ISOVUE-370) solution 500 mL (100 mLs Intravenous Given 12/17/19 1412)     Drips infusing:  No  For the majority of the shift, the patient's behavior Green. Interventions performed were NA.     Severe Sepsis OR Septic Shock Diagnosis Present: No      ED Nurse Name/Phone Number: Karen Du RN,   3:59 PM    RECEIVING UNIT ED HANDOFF REVIEW    Above ED Nurse Handoff Report was reviewed: Yes  Reviewed by: Erlinda Edwards RN on December 17, 2019 at 4:08 PM

## 2019-12-17 NOTE — ED TRIAGE NOTES
Patient sent from surgeon d/t bili 7.5 with increased abdominal pain. Patient here yesterday. ABC's intact.

## 2019-12-17 NOTE — H&P
[facility] Outpatient / Observation Unit  History and Physical Exam     Kemal Rodriguez MRN# [id number]   YOB: 1970 Age: 49 year old      Date of Admission:  12/17/2019    Primary care provider: Juanita Sorenson          Assessment:   Kemal Rodriguez is a 49 year old male with a PMH significant for G6PD deficiency, HTN, BPH and hx of MRSA, who presents with complaints of intractable RUQ pain. Pt was diagnosed with biliary colic yesterday in the ED and presented to surgery clinic today for discussion of definitive management and was referred back to the ED due to the significant elevation of his LFTs.   Work up in the ED reveals: VSS. CMP showed improvement in LFTs from previous ED visit, total bilirubin 1.8, down from 7.5. , AST 79, both improved from yesterday. CBC is fairly unremarkable, Hgb stable at 10.8. Lipase is within normal limits. CT abd/pelvis today shows cholelithiasis and RUQ US from yesterday shows cholelithiasis and sludge in the gallbladder. He received 1L NS bolus, 4 mg IV zofran and 4 mg IV morphine x2.   Patient will be registered to Observation for further evaluation and symptom management for intractable biliary colic/early acute cholecystitis.     1. Intractable biliary colic - intermittent episodes of RUQ abd pain for past several months. Developed intractable pain early yesterday morning, denies fever, nausea or vomiting. Presented to ED, dx with cholelithiasis. Total bilirubin was 7.5. He was discharged home and followed up with general surgery today who referred him back to ED due to elevated bilirubin, which is now improved, 1.8. LFTs overall improved today. No evidence of acute cholecystitis on imaging, no definite choledocholithiasis on imaging. Will get MRCP to assess for biliary stone, pain control, supportive cares, clear liquid diet, NPO at midnight and general surgery consult.   2. G6PD deficiency  3. HTN - resume home atenolol and diltiazem, hold  amlodipine in AM for possible surgery.  4. BPH - resume terazosin          Plan:     1. [place] to Observation  2. IV hydration with Normal saline, @, 100 ml/hr   3. Cont supportive care with anti-emetics and pain control   4. General Surgery consult for timing of cholecystectomy (if appropriate)  5. Hold off on antibiotics for now  6. Follow CBC, BMP, LFTs  7. MRCP  8. Clear liquids, NPO at midnight  9. DVT prophylaxis: pt at low risk, encourage ambulation                Chief Complaint:   RUQ pain         History of Present Illness:   Kemal Rodriguez is a 49 year old male with a PMH significant for G6PD deficiency, HTN, BPH and hx of MRSA, who presents with complaints of intractable RUQ pain. Pt notes intermittne RUQ abd pain for the past several months that typically occurs with eating. Denies associated nausea, vomiting or fevers. This episode started early yesterday morning and has persisted. He presented to the ED yesterday and diagnosed with biliary colic, he was discharged home and presented to surgery clinic today for discussion of definitive management and was referred back to the ED due to the significant elevation of his LFTs. He denies chest pain, SOB, or dysuria. He notes his stools are not normal during these episodes, stating in the past, his stools have been green or black. Today, he notes his stools are grey.                   Past Medical History:   HTN  G6PD deficiency   BPH          Past Surgical History:   Hernia repair          Social History:     Social History     Socioeconomic History     Marital status:      Spouse name: Not on file     Number of children: Not on file     Years of education: Not on file     Highest education level: Not on file   Occupational History     Not on file   Social Needs     Financial resource strain: Not on file     Food insecurity:     Worry: Not on file     Inability: Not on file     Transportation needs:     Medical: Not on file     Non-medical:  Not on file   Tobacco Use     Smoking status: Unknown If Ever Smoked     Smokeless tobacco: Current User     Types: Chew     Tobacco comment: 2 tins/day   Substance and Sexual Activity     Alcohol use: Never     Frequency: Never     Drug use: Never     Sexual activity: Not on file   Lifestyle     Physical activity:     Days per week: Not on file     Minutes per session: Not on file     Stress: Not on file   Relationships     Social connections:     Talks on phone: Not on file     Gets together: Not on file     Attends Rastafarian service: Not on file     Active member of club or organization: Not on file     Attends meetings of clubs or organizations: Not on file     Relationship status: Not on file     Intimate partner violence:     Fear of current or ex partner: Not on file     Emotionally abused: Not on file     Physically abused: Not on file     Forced sexual activity: Not on file   Other Topics Concern     Not on file   Social History Narrative     Not on file               Family History:   Reviewed and non contributory          Allergies:      Allergies   Allergen Reactions     Quinine GI Disturbance and Nausea and Vomiting     Nausea & Vomiting       Penicillins      Sulfa Drugs                Medications:     Prior to Admission medications    Medication Sig Last Dose Taking? Auth Provider   amLODIPine (NORVASC) 10 MG tablet TAKE ONE TABLET BY MOUTH EVERY DAY 12/17/2019 at Unknown time Yes Reported, Patient   aspirin 81 MG EC tablet Take 81 mg by mouth At Bedtime 12/16/2019 at Unknown time Yes Unknown, Entered By History   atenolol (TENORMIN) 50 MG tablet Take 50 mg by mouth daily 12/17/2019 at Unknown time Yes Reported, Patient   atorvastatin (LIPITOR) 40 MG tablet TAKE 1 TABLET AT BEDTIME 12/16/2019 at Unknown time Yes Reported, Patient   Calcium Carb-Ergocalciferol 500-200 MG-UNIT TABS Take 1 tablet by mouth daily  12/17/2019 at Unknown time Yes Reported, Patient   cholecalciferol (VITAMIN D3) 125 MCG (5000  UT) TABS tablet Take 5,000 Units by mouth daily  12/17/2019 at Unknown time Yes Reported, Patient   diltiazem ER COATED BEADS (CARDIZEM CD/CARTIA XT) 240 MG 24 hr capsule Take 240 mg by mouth daily  12/17/2019 at Unknown time Yes Reported, Patient   HYDROcodone-acetaminophen (NORCO) 5-325 MG tablet Take 1-2 tablets by mouth every 6 hours as needed for breakthrough pain or severe pain  Yes Flo Das MD   isosorbide mononitrate (IMDUR) 30 MG 24 hr tablet Take 90 mg by mouth daily  12/17/2019 at Unknown time Yes Reported, Patient   Multiple Vitamin (MULTI-VITAMINS) TABS Take 1 tablet by mouth daily  12/17/2019 at Unknown time Yes Reported, Patient   nicotine (NICODERM CQ) 21 MG/24HR 24 hr patch Place 1 patch onto the skin every 24 hours 12/16/2019 at Unknown time Yes Unknown, Entered By History   nicotine polacrilex (NICORETTE) 4 MG gum Place 4 mg inside cheek as needed for smoking cessation  Yes Unknown, Entered By History   omega 3 1000 MG CAPS Take 1 g by mouth daily  12/17/2019 at Unknown time Yes Reported, Patient   sertraline (ZOLOFT) 100 MG tablet Take 200 mg by mouth At Bedtime  12/16/2019 at Unknown time Yes Reported, Patient   terazosin (HYTRIN) 1 MG capsule TAKE 1 CAPSULE AT BEDTIME 12/16/2019 at Unknown time Yes Reported, Patient              Review of Systems:   A Comprehensive greater than 10 system review of systems was carried out.  Pertinent positives and negatives are noted above.  Otherwise negative for contributory information.     Constitutional, neuro, ENT, endocrine, pulmonary, cardiac, gastrointestinal, genitourinary, musculoskeletal, integument and psychiatric systems are negative, except as otherwise noted.         Physical Exam:   Blood pressure (!) 145/86, pulse 65, temperature 98  F (36.7  C), temperature source Oral, resp. rate 18, height 1.829 m (6'), weight 115.1 kg (253 lb 12.8 oz), SpO2 97 %.    GENERAL:  Comfortable.  PSYCH: pleasant, oriented, No acute distress.  HEENT:   Atraumatic, normocephalic. Normal conjunctiva, normal hearing, and oropharynx is normal.  NECK:  Supple, no neck vein distention  HEART:  Normal S1, S2 with no murmur, no pericardial rub, gallops or S3 or S4.  LUNGS:  Clear to auscultation, normal Respiratory effort. No wheezing, rales or ronchi.  GI:  Soft, normal bowel sounds. RUQ abd tender. Non distended.   EXTREMITIES:  No pedal edema, +2 pulses bilateral and equal.  SKIN:  Dry to touch, No rash, wound or ulcerations.  NEUROLOGIC:  CN 2-12 intact, BL 5/5 symmetric upper and lower extremity strength, sensation is intact with no focal deficits.              Data:     Recent Labs   Lab 12/17/19  1339 12/16/19  0845   WBC 8.4 8.8   HGB 10.8* 10.7*   HCT 34.6* 34.2*   * 108*    310     Recent Labs   Lab 12/17/19  1339 12/16/19  0845    137   POTASSIUM 3.4 3.9   CHLORIDE 104 106   CO2 27 27   ANIONGAP 7 4   GLC 82 118*   BUN 9 8   CR 0.81 0.76   GFRESTIMATED >90 >90   GFRESTBLACK >90 >90   DALE 9.0 8.5   PROTTOTAL 7.3 7.0   ALBUMIN 4.2 3.9   BILITOTAL 1.8* 7.5*   ALKPHOS 93 96   AST 79* 253*   * 209*     Recent Labs   Lab 12/17/19  1339 12/16/19  0845   LIPASE 133 151         Recent Results (from the past 48 hour(s))   US Abdomen Limited    Narrative    ULTRASOUND ABDOMEN LIMITED December 16, 2019 at 0847 hours    HISTORY: 49-year-old patient with right upper quadrant pain after  eating.    COMPARISON: None.    FINDINGS: The visualized pancreas is unremarkable. The liver is  relatively echogenic and slightly coarsened, with size upper limits of  normal measuring 17.7 cm. Appearance corresponds to diffuse fatty  infiltration. The right kidney is 12.2 cm in length and 2 cm in AP  cortical thickness. Multiple nonshadowing filling defects in the  dependent portions of the gallbladder, either sludge or gallstones.  These appear mobile. Gallbladder wall thickness is normal at 2.3 mm.  No pericholecystic fluid. Common bile duct is 3.6 mm.       Impression    IMPRESSION:  1. Borderline hepatomegaly with diffuse fatty infiltration throughout  the liver.  2. Dependent cholelithiasis and/or sludge in gallbladder lumen, though  no complicating features such as gallbladder wall thickness or  pericholecystic fluid.    MELECIO GRIER MD   CT Abdomen Pelvis w Contrast    Narrative    CT ABDOMEN/PELVIS WITH CONTRAST December 17, 2019 2:20 PM     HISTORY: Abdomen pain, elevated bilirubin and liver function tests.    TECHNIQUE: 100mL Isovue-370. CT images of the abdomen and pelvis  following nonionic intravenous contrast. Radiation dose for this scan  was reduced using automated exposure control, adjustment of the mA  and/or kV according to patient size, or iterative reconstruction  technique.    COMPARISON: None.    FINDINGS: There is mild diffuse decreased attenuation of the liver.  There is a subtle low-density lesion adjacent to the gallbladder that  measures approximately 1.1 cm (series 3, image 34). There is another  low-density lesion in the superior aspect of segment 2 that measures  1.8 cm (series 3, image 15). A gallstone is present. The spleen  appears normal. The pancreas, adrenal glands, and kidneys are  unremarkable. No pathologically enlarged abdominal or retroperitoneal  lymph nodes. The appendix is normal. No abnormal bowel distention,  free air, or ascites.    No pelvic adenopathy, free fluid, or mass. No worrisome bone lesions.      Impression    IMPRESSION:  1. Fatty infiltration of the liver.  2. Low-attenuation liver lesions are poorly characterized on this  study but likely represent hemangiomas. Consider confirmation with  hepatic MRI.  3. Cholelithiasis.    MD Liliana INGRAM PA-C PA-C

## 2019-12-17 NOTE — PROGRESS NOTES
[facility]  New Patient Office Visit      Kemal Rodriguez is a 49 year old male seen in consultation for Abdominal pain, right upper quadrant at the request of Dr Das       Assessment and Plan:  Kemal has been having RUQ pain for multiple weeks, is jaundiced on exam and had a bilirubin of 7.5 yesterday in the ED with findings of cholelithiasis. There was a normal CBD on ulltrasound but his level of hyperbilirubinemia, ongoing pain and acholic stools is concerning for choledocholithiasis. There was no findings of gallbladder wall thickening or pericholecystic fluid to indicate cholecystitis but there may be an element of that as well.    I instructed him to return to the ED today for evaluation with repeat of his laboratory studies, including a direct and indirect bilirubin. He may need MRCP to rule out choledocholithiasis and then will likely need admission for further management such as ERCP if there is evidence of choledocholithiasis, versus urgent cholecystectomy as an inpatient given his significant symptoms.   He does have a history of G6PD deficiency so would expect more mild hyperbilirubinemia - in the past have been in the 2.0 range.  I will plan to see him if he is admitted tomorrow in the hospital      Chief complaint:  Abdominal pain, right upper quadrant    HPI:  Kemal Rodriguez is a 49 year old male who presents with constant right upper quadrant pain for several weeks.  The pain is associated with eating any type of food. In retrospect he states the pain is typically worse at night after dinner. Positive for associated symptoms of nausea, diarrhea and dyspnea. He has noted dark urine, yellow eyes and gray colored stools yesterday. He has not noted a fever. Has a history of G6PD deficiency so notes his bilirubin has been slightly elevated in the past, in the 2.0 range at his last [place] chart check.  He reports he had an ERCP when he was 10yo for gallstones, but never had his  gallbladder out as he reports he never had problems after that.    He went to the ED yesterday for worsening symptoms, very severe RUQ pain. Pain has slightly improved but is still present. Workup including labs showed bilirubin of 7.5 and ultrasound showed cholelithiasis with normal CBD diameter. Pain slightly improved and he was sent home    Past Medical History:  G6PD deficiency  Coronary artery spasms  HTN  BPH    Past Surgical History:  R inguinal hernia repair x 3 1982, 1995, 1999 with neurectomy  Knee replacement 2019    Social History:  Chews tobacco  No etoh or drug use     Family History:  His mother and all 5 siblings had gallbladder removed    Review of Systems:  The 10 point review of systems is negative other than noted in the HPI and above.      Physical Exam:  Vitals: /86 (BP Location: Right arm, Patient Position: Sitting, Cuff Size: Adult Regular)   Pulse 67   Resp 16   Ht 1.829 m (6')   Wt 120.2 kg (265 lb)   SpO2 96%   BMI 35.94 kg/m    BMI= Body mass index is 35.94 kg/m .  General - Well developed, well nourished male in no apparent distress  HEENT:  Head normocephalic and atraumatic, pupils equal and round, conjunctivae clear, + scleral icterus, mucous membranes moist, external ears and nose normal  Neck: Supple without thyromegaly or masses  Lymphatic: No cervical, or supraclavicular lymphadenopathy  Pulmonary: breathing comfortably on RA  CV: Regular rate  Abdomen: soft, flat, non-distended with moderate tenderness noted in the right upper quadrant . no masses palpated.  Musculoskeletal:  Moves all extremities equally, arm without edema  Neurologic: alert, speech is clear, nonfocal  Psychiatric: Mood and affect appropriate  Skin: Without lesions, rashes     Relevant labs:    WBC -   Lab Results   Component Value Date    WBC 8.8 12/16/2019       HgB -   Lab Results   Component Value Date    HGB 10.7 (L) 12/16/2019       Plt-   Lab Results   Component Value Date     12/16/2019        Liver Function Studies -   Recent Labs   Lab Test 12/16/19  0845   PROTTOTAL 7.0   ALBUMIN 3.9   BILITOTAL 7.5*   ALKPHOS 96   *   *       Lipase-   Lab Results   Component Value Date    LIPASE 151 12/16/2019           Imaging:  All imaging studies reviewed by me.    Ultrasound shows: positive cholelithiasis, negative gallbladder wall thickening, negative ductal dilatation, negative pericholecystic fluid, negative sonographic Guardado's sign.    Recent Results (from the past 744 hour(s))   US Abdomen Limited    Narrative    ULTRASOUND ABDOMEN LIMITED December 16, 2019 at 0847 hours    HISTORY: 49-year-old patient with right upper quadrant pain after  eating.    COMPARISON: None.    FINDINGS: The visualized pancreas is unremarkable. The liver is  relatively echogenic and slightly coarsened, with size upper limits of  normal measuring 17.7 cm. Appearance corresponds to diffuse fatty  infiltration. The right kidney is 12.2 cm in length and 2 cm in AP  cortical thickness. Multiple nonshadowing filling defects in the  dependent portions of the gallbladder, either sludge or gallstones.  These appear mobile. Gallbladder wall thickness is normal at 2.3 mm.  No pericholecystic fluid. Common bile duct is 3.6 mm.      Impression    IMPRESSION:  1. Borderline hepatomegaly with diffuse fatty infiltration throughout  the liver.  2. Dependent cholelithiasis and/or sludge in gallbladder lumen, though  no complicating features such as gallbladder wall thickness or  pericholecystic fluid.    MELECIO GRIER MD         This note was created using voice recognition software. Undetected word substitutions or other errors may have occurred.     Time spent with the patient with greater that 50% of the time in discussion was 20 minutes.     Therese Crow MD  [facility], [place]    Please route or send letter to:  *None*

## 2019-12-17 NOTE — PHARMACY-ADMISSION MEDICATION HISTORY
Admission medication history interview status for this patient is complete. See [facility] admission navigator for allergy information, prior to admission medications and immunization status.     Medication history interview source(s):Patient  Medication history resources (including written lists, pill bottles, clinic record):None  Primary pharmacy:[facility]    Changes made to PTA medication list:  Added: aspirin, nicoderm, nicorret  Deleted: zantac  Changed: -    Actions taken by pharmacist (provider contacted, etc):None     Additional medication history information:None    Medication reconciliation/reorder completed by provider prior to medication history?  No     Do you take OTC medications (eg tylenol, ibuprofen, fish oil, eye/ear drops, etc)? Yes     For patients on insulin therapy: No  Lantus/levemir/NPH/toujeo/tresiba/Mix 70/30 dose:  No  Sliding scale Novolog: No  If Yes, do you have a baseline novolog pre-meal dose:  -  units with meals  Patients eat three meals a day: NA  How many episodes of hypoglycemia do you have per week: -  How many missed doses do you have per week: -  How many times do you check your blood glucose per day:  -  Do you have a Continuous glucose monitor (CGM) : No (remind pt that not approved for hospital use)  Any Barriers to therapy - Be specific :  No  (cost of medications, comfortable with giving injections (if applicable), comfortable and confident with current diabetes regimen)      Prior to Admission medications    Medication Sig Last Dose Taking? Auth Provider   amLODIPine (NORVASC) 10 MG tablet TAKE ONE TABLET BY MOUTH EVERY DAY 12/17/2019 at Unknown time Yes Reported, Patient   aspirin 81 MG EC tablet Take 81 mg by mouth At Bedtime 12/16/2019 at Unknown time Yes Unknown, Entered By History   atenolol (TENORMIN) 50 MG tablet Take 50 mg by mouth daily 12/17/2019 at Unknown time Yes Reported, Patient   atorvastatin (LIPITOR) 40 MG tablet TAKE 1 TABLET AT  BEDTIME 12/16/2019 at Unknown time Yes Reported, Patient   Calcium Carb-Ergocalciferol 500-200 MG-UNIT TABS Take 1 tablet by mouth daily  12/17/2019 at Unknown time Yes Reported, Patient   cholecalciferol (VITAMIN D3) 125 MCG (5000 UT) TABS tablet Take 5,000 Units by mouth daily  12/17/2019 at Unknown time Yes Reported, Patient   diltiazem ER COATED BEADS (CARDIZEM CD/CARTIA XT) 240 MG 24 hr capsule Take 240 mg by mouth daily  12/17/2019 at Unknown time Yes Reported, Patient   HYDROcodone-acetaminophen (NORCO) 5-325 MG tablet Take 1-2 tablets by mouth every 6 hours as needed for breakthrough pain or severe pain  Yes Flo Das MD   isosorbide mononitrate (IMDUR) 30 MG 24 hr tablet Take 90 mg by mouth daily  12/17/2019 at Unknown time Yes Reported, Patient   Multiple Vitamin (MULTI-VITAMINS) TABS Take 1 tablet by mouth daily  12/17/2019 at Unknown time Yes Reported, Patient   nicotine (NICODERM CQ) 21 MG/24HR 24 hr patch Place 1 patch onto the skin every 24 hours 12/16/2019 at Unknown time Yes Unknown, Entered By History   nicotine polacrilex (NICORETTE) 4 MG gum Place 4 mg inside cheek as needed for smoking cessation  Yes Unknown, Entered By History   omega 3 1000 MG CAPS Take 1 g by mouth daily  12/17/2019 at Unknown time Yes Reported, Patient   sertraline (ZOLOFT) 100 MG tablet Take 200 mg by mouth At Bedtime  12/16/2019 at Unknown time Yes Reported, Patient   terazosin (HYTRIN) 1 MG capsule TAKE 1 CAPSULE AT BEDTIME 12/16/2019 at Unknown time Yes Reported, Patient

## 2019-12-17 NOTE — ED PROVIDER NOTES
History   Chief Complaint:  Abnormal Labs     HPI   Kemal Rodriguez is a 49 year old male with history of G6PD deficiency who presents with abnormal labs. Per chart review, the patient was seen in the ED yesterday for abdominal pain on the right side of his abdomen that started 2 days ago. Laboratory and imaging results were obtained as noted below. The patient was discharged and instructed to follow up with surgery. He followed up with Dr. Crow of general surgery today who was concerned for choledocholithiasis due to a bilirubin of 7.5 in the ED, and instructed the patient to return to the ED for repeat evaluation. Currently, the patient notes some right lower quadrant pain. He also endorses gray stool this morning. The patient denies a history of any pancreatic issues, but did once have gallstones. He underwent an ERCP procedure for the gallstones when he was 10. The patient reports that he has a history of liver problems and that, when he gets sick, he experiences hemolysis and a rise in bilirubin levels though never as high as 7. The patient denies bruising, bleeding, chest pain, shortness of breath, or vomiting.     12/16 [facility] Emergency Department:   US Abdomen Limited:  1. Borderline hepatomegaly with diffuse fatty infiltration throughout  the liver.  2. Dependent cholelithiasis and/or sludge in gallbladder lumen, though  no complicating features such as gallbladder wall thickness or  pericholecystic fluid.    CBC: WBC: 8.8, HGB: 10.7 (L), PLT: 310  CMP: Glucose 118 (H), Bilirubin Total 7.5 (H),  (H),  (H) o/w WNL (Creatinine 0.76)  Lipase: 151    Allergies:  Quinine  Penicillins  Sulfa Drugs    Medications:   Amlodipine  Atenolol  Atorvastatin  Cardizem  Imdur  Zantac  Zoloft    Past Medical History:    Hypertension  G6PD deficiency  MRSA  Hernia  Benign prostatic hypertension  Headache  Hemangioma Liver  Sleep apnea  vertigo    Past Surgical History:    Hernia repair  x3  Arthroscopy knee and total knee replacement    Family History:   History reviewed. No pertinent family history.    Social History:  Smoking Status: unknown  Smokeless Tobacco: current user  Alcohol Use: Never  Drug Use: Never  PCP: Juanita Sorenson     Review of Systems   Respiratory: Negative for shortness of breath.    Cardiovascular: Negative for chest pain.   Gastrointestinal: Positive for abdominal pain. Negative for vomiting.   Hematological: Does not bruise/bleed easily.   All other systems reviewed and are negative.    Physical Exam     Patient Vitals for the past 24 hrs:   BP Temp Temp src Pulse Resp SpO2   12/17/19 1342 -- -- -- 63 -- 98 %   12/17/19 1216 (!) 159/93 98.8  F (37.1  C) Temporal 66 18 99 %       Physical Exam  Constitutional: Patient is well appearing. No distress.  Head: Atraumatic.  Mouth/Throat: Oropharynx is clear and moist. No oropharyngeal exudate.  Eyes: Conjunctivae and EOM are normal. No scleral icterus.  Neck: Normal range of motion. Neck supple.   Cardiovascular: Normal rate, regular rhythm, normal heart sounds and intact distal pulses.   Pulmonary/Chest: Breath sounds normal. No respiratory distress.  Abdominal: Soft. Bowel sounds are normal. No distension. No rebound or guarding. Localizes pain to the right side.  Musculoskeletal: Normal range of motion. No edema or tenderness.   Neurological: Alert and orientated to person, place, and time. No focal exam findings  Skin: Warm and dry. No rash noted. Not diaphoretic.       Emergency Department Course   Imaging:  Radiology findings were communicated with the patient who voiced understanding of the findings.    CT Abdomen Pelvis w Contrast  1. Fatty infiltration of the liver.  2. Low-attenuation liver lesions are poorly characterized on this  study but likely represent hemangiomas. Consider confirmation with  hepatic MRI.  3. Cholelithiasis.  ALEE HARRISON MD    Laboratory:  Laboratory findings were communicated with the  patient who voiced understanding of the findings.    CBC: WBC 8.4, HGB 10.8(L),   CMP: bilirubin 1.8(H), (H), AST 79(H) o/w WNL (Creatinine 0.81)  Lipase: 133    Interventions:  1337 NS 1000 mL IV  1338 Morphine 4 mg IV  1338 Zofran 4 mg IV  1452 Morphine 4 mg IV    Emergency Department Course:  Nursing notes and vitals reviewed.    1308 I performed an exam of the patient as documented above.     IV was inserted and blood was drawn for laboratory testing, results above.    The patient was sent for a CT Abdomen Pelvis while in the emergency department, results above.      1512 I spoke with Dr. Cuellar of the General Surgery service regarding patient's presentation, findings, and plan of care.     1527 I rechecked the patient. Explained findings to the Patient.    1534 I spoke with Liliana Santacruz PA-C of the Hospitalist service from [facility] regarding patient's presentation, findings, and plan of care.     Findings and plan explained to the Patient who consents to admission. Discussed the patient with Dr. Corbett, who will admit the patient to a observation bed for further monitoring, evaluation, and treatment.     Impression & Plan    Medical Decision Making:  Kemal Rodriguez is a 49 year old male who presents to the emergency department today with history and exam most-consistent with biliary colic, resolved choledocholithiasis and need for GB removal. The patient returns to the ED today after the surgeon he consulted directed him back out of concern for an elevated bilirubin on 7.5 in the previous day's workup. The workup in the Emergency Room shows normal biliary and liver enzymes, normal WBC, and a CT that demonstrates hemangiomas.  There is no evidence at this point of serious complications of cholecystitis such as gangrenous cholecystitis, septic shock, ascending cholangitis, or gallstone pancreatitis. I consulted Dr Cuellar who is colleagues with Dr Crow, and the patient will be  admitted for symptom control, MRCP, and likely surgery tomorrow. The patient is in understanding and agreement with this plan.     Diagnosis:    ICD-10-CM    1. Biliary colic K80.50      Disposition:   The patient is admitted into the care of Dr. Corbett.    Matthew Disclosure:  I, Iqra Dimasion, am serving as a scribe at 1:04 PM on 12/17/2019 to document services personally performed by Garfield Gray MD based on my observations and the provider's statements to me.   [facility] EMERGENCY DEPARTMENT       Garfield Gray MD  12/17/19 3973

## 2019-12-17 NOTE — LETTER
2019    RE: Kemal Rodriguez, : 1970      [facility]  New Patient Office Visit        Kemal Rodriguez is a 49 year old male seen in consultation for Abdominal pain, right upper quadrant at the request of Dr Das       Assessment and Plan:  Kemal has been having RUQ pain for multiple weeks, is jaundiced on exam and had a bilirubin of 7.5 yesterday in the ED with findings of cholelithiasis. There was a normal CBD on ulltrasound but his level of hyperbilirubinemia, ongoing pain and acholic stools is concerning for choledocholithiasis. There was no findings of gallbladder wall thickening or pericholecystic fluid to indicate cholecystitis but there may be an element of that as well.     I instructed him to return to the ED today for evaluation with repeat of his laboratory studies, including a direct and indirect bilirubin. He may need MRCP to rule out choledocholithiasis and then will likely need admission for further management such as ERCP if there is evidence of choledocholithiasis, versus urgent cholecystectomy as an inpatient given his significant symptoms.   He does have a history of G6PD deficiency so would expect more mild hyperbilirubinemia - in the past have been in the 2.0 range.  I will plan to see him if he is admitted tomorrow in the hospital      Chief complaint:  Abdominal pain, right upper quadrant     HPI:  Kemal Rodriguez is a 49 year old male who presents with constant right upper quadrant pain for several weeks.  The pain is associated with eating any type of food. In retrospect he states the pain is typically worse at night after dinner. Positive for associated symptoms of nausea, diarrhea and dyspnea. He has noted dark urine, yellow eyes and gray colored stools yesterday. He has not noted a fever. Has a history of G6PD deficiency so notes his bilirubin has been slightly elevated in the past, in the 2.0 range at his last [place] chart check.  He reports  he had an ERCP when he was 12yo for gallstones, but never had his gallbladder out as he reports he never had problems after that.     He went to the ED yesterday for worsening symptoms, very severe RUQ pain. Pain has slightly improved but is still present. Workup including labs showed bilirubin of 7.5 and ultrasound showed cholelithiasis with normal CBD diameter. Pain slightly improved and he was sent home     Past Medical History:  G6PD deficiency  Coronary artery spasms  HTN  BPH     Past Surgical History:  R inguinal hernia repair x 3 1982, 1995, 1999 with neurectomy  Knee replacement 2019     Social History:  Chews tobacco  No etoh or drug use      Family History:  His mother and all 5 siblings had gallbladder removed     Review of Systems:  The 10 point review of systems is negative other than noted in the HPI and above.      Physical Exam:  Vitals: /86 (BP Location: Right arm, Patient Position: Sitting, Cuff Size: Adult Regular)   Pulse 67   Resp 16   Ht 1.829 m (6')   Wt 120.2 kg (265 lb)   SpO2 96%   BMI 35.94 kg/m    BMI= Body mass index is 35.94 kg/m .  General - Well developed, well nourished male in no apparent distress  HEENT:  Head normocephalic and atraumatic, pupils equal and round, conjunctivae clear, + scleral icterus, mucous membranes moist, external ears and nose normal  Neck: Supple without thyromegaly or masses  Lymphatic: No cervical, or supraclavicular lymphadenopathy  Pulmonary: breathing comfortably on RA  CV: Regular rate  Abdomen: soft, flat, non-distended with moderate tenderness noted in the right upper quadrant . no masses palpated.  Musculoskeletal:  Moves all extremities equally, arm without edema  Neurologic: alert, speech is clear, nonfocal  Psychiatric: Mood and affect appropriate  Skin: Without lesions, rashes      Relevant labs:     WBC -         Lab Results   Component Value Date     WBC 8.8 12/16/2019         HgB -         Lab Results   Component Value Date     HGB  10.7 (L) 12/16/2019         Plt-         Lab Results   Component Value Date      12/16/2019         Liver Function Studies -       Recent Labs   Lab Test 12/16/19  0845   PROTTOTAL 7.0   ALBUMIN 3.9   BILITOTAL 7.5*   ALKPHOS 96   *   *         Lipase-         Lab Results   Component Value Date     LIPASE 151 12/16/2019         Imaging:  All imaging studies reviewed by me.     Ultrasound shows: positive cholelithiasis, negative gallbladder wall thickening, negative ductal dilatation, negative pericholecystic fluid, negative sonographic Guardado's sign.         Recent Results (from the past 744 hour(s))   US Abdomen Limited     Narrative     ULTRASOUND ABDOMEN LIMITED December 16, 2019 at 0847 hours     HISTORY: 49-year-old patient with right upper quadrant pain after  eating.     COMPARISON: None.     FINDINGS: The visualized pancreas is unremarkable. The liver is  relatively echogenic and slightly coarsened, with size upper limits of  normal measuring 17.7 cm. Appearance corresponds to diffuse fatty  infiltration. The right kidney is 12.2 cm in length and 2 cm in AP  cortical thickness. Multiple nonshadowing filling defects in the  dependent portions of the gallbladder, either sludge or gallstones.  These appear mobile. Gallbladder wall thickness is normal at 2.3 mm.  No pericholecystic fluid. Common bile duct is 3.6 mm.        Impression     IMPRESSION:  1. Borderline hepatomegaly with diffuse fatty infiltration throughout  the liver.  2. Dependent cholelithiasis and/or sludge in gallbladder lumen, though  no complicating features such as gallbladder wall thickness or  pericholecystic fluid.     MD Therese RAWLS MD  [facility], [place]

## 2019-12-18 ENCOUNTER — ANESTHESIA (OUTPATIENT)
Dept: SURGERY | Facility: CLINIC | Age: 49
End: 2019-12-18
Payer: COMMERCIAL

## 2019-12-18 ENCOUNTER — OFFICE VISIT (OUTPATIENT)
Dept: SURGERY | Facility: PHYSICIAN GROUP | Age: 49
End: 2019-12-18
Payer: COMMERCIAL

## 2019-12-18 ENCOUNTER — ANESTHESIA EVENT (OUTPATIENT)
Dept: SURGERY | Facility: CLINIC | Age: 49
End: 2019-12-18
Payer: COMMERCIAL

## 2019-12-18 VITALS
SYSTOLIC BLOOD PRESSURE: 136 MMHG | WEIGHT: 253.8 LBS | HEART RATE: 73 BPM | DIASTOLIC BLOOD PRESSURE: 72 MMHG | HEIGHT: 72 IN | RESPIRATION RATE: 14 BRPM | BODY MASS INDEX: 34.38 KG/M2 | OXYGEN SATURATION: 92 % | TEMPERATURE: 96.9 F

## 2019-12-18 LAB
ALBUMIN SERPL-MCNC: 3.9 G/DL (ref 3.4–5)
ALP SERPL-CCNC: 83 U/L (ref 40–150)
ALT SERPL W P-5'-P-CCNC: 136 U/L (ref 0–70)
ANION GAP SERPL CALCULATED.3IONS-SCNC: 5 MMOL/L (ref 3–14)
AST SERPL W P-5'-P-CCNC: 52 U/L (ref 0–45)
BASOPHILS # BLD AUTO: 0.1 10E9/L (ref 0–0.2)
BASOPHILS NFR BLD AUTO: 1.6 %
BILIRUB SERPL-MCNC: 1.9 MG/DL (ref 0.2–1.3)
BUN SERPL-MCNC: 8 MG/DL (ref 7–30)
CALCIUM SERPL-MCNC: 8.3 MG/DL (ref 8.5–10.1)
CHLORIDE SERPL-SCNC: 106 MMOL/L (ref 94–109)
CO2 SERPL-SCNC: 28 MMOL/L (ref 20–32)
CREAT SERPL-MCNC: 0.77 MG/DL (ref 0.66–1.25)
DIFFERENTIAL METHOD BLD: ABNORMAL
EOSINOPHIL # BLD AUTO: 0.2 10E9/L (ref 0–0.7)
EOSINOPHIL NFR BLD AUTO: 4.3 %
ERYTHROCYTE [DISTWIDTH] IN BLOOD BY AUTOMATED COUNT: 10.9 % (ref 10–15)
GFR SERPL CREATININE-BSD FRML MDRD: >90 ML/MIN/{1.73_M2}
GLUCOSE SERPL-MCNC: 95 MG/DL (ref 70–99)
HCT VFR BLD AUTO: 35.3 % (ref 40–53)
HGB BLD-MCNC: 10.8 G/DL (ref 13.3–17.7)
IMM GRANULOCYTES # BLD: 0 10E9/L (ref 0–0.4)
IMM GRANULOCYTES NFR BLD: 0.7 %
LYMPHOCYTES # BLD AUTO: 1.7 10E9/L (ref 0.8–5.3)
LYMPHOCYTES NFR BLD AUTO: 29.8 %
MCH RBC QN AUTO: 33.2 PG (ref 26.5–33)
MCHC RBC AUTO-ENTMCNC: 30.6 G/DL (ref 31.5–36.5)
MCV RBC AUTO: 109 FL (ref 78–100)
MONOCYTES # BLD AUTO: 0.4 10E9/L (ref 0–1.3)
MONOCYTES NFR BLD AUTO: 7.4 %
NEUTROPHILS # BLD AUTO: 3.1 10E9/L (ref 1.6–8.3)
NEUTROPHILS NFR BLD AUTO: 56.2 %
NRBC # BLD AUTO: 0 10*3/UL
NRBC BLD AUTO-RTO: 0 /100
PLATELET # BLD AUTO: 299 10E9/L (ref 150–450)
POTASSIUM SERPL-SCNC: 3.7 MMOL/L (ref 3.4–5.3)
PROT SERPL-MCNC: 6.8 G/DL (ref 6.8–8.8)
RBC # BLD AUTO: 3.25 10E12/L (ref 4.4–5.9)
SODIUM SERPL-SCNC: 139 MMOL/L (ref 133–144)
WBC # BLD AUTO: 5.5 10E9/L (ref 4–11)

## 2019-12-18 PROCEDURE — 71000014 ZZH RECOVERY PHASE 1 LEVEL 2 FIRST HR: Performed by: SURGERY

## 2019-12-18 PROCEDURE — 25800030 ZZH RX IP 258 OP 636: Performed by: ANESTHESIOLOGY

## 2019-12-18 PROCEDURE — 27210794 ZZH OR GENERAL SUPPLY STERILE: Performed by: SURGERY

## 2019-12-18 PROCEDURE — 36000058 ZZH SURGERY LEVEL 3 EA 15 ADDTL MIN: Performed by: SURGERY

## 2019-12-18 PROCEDURE — 25000125 ZZHC RX 250: Performed by: NURSE ANESTHETIST, CERTIFIED REGISTERED

## 2019-12-18 PROCEDURE — 85025 COMPLETE CBC W/AUTO DIFF WBC: CPT | Performed by: PHYSICIAN ASSISTANT

## 2019-12-18 PROCEDURE — 37000008 ZZH ANESTHESIA TECHNICAL FEE, 1ST 30 MIN: Performed by: SURGERY

## 2019-12-18 PROCEDURE — 36415 COLL VENOUS BLD VENIPUNCTURE: CPT | Performed by: PHYSICIAN ASSISTANT

## 2019-12-18 PROCEDURE — 25000125 ZZHC RX 250: Performed by: SURGERY

## 2019-12-18 PROCEDURE — 88304 TISSUE EXAM BY PATHOLOGIST: CPT | Performed by: SURGERY

## 2019-12-18 PROCEDURE — 25000132 ZZH RX MED GY IP 250 OP 250 PS 637: Performed by: PHYSICIAN ASSISTANT

## 2019-12-18 PROCEDURE — 37000009 ZZH ANESTHESIA TECHNICAL FEE, EACH ADDTL 15 MIN: Performed by: SURGERY

## 2019-12-18 PROCEDURE — 80053 COMPREHEN METABOLIC PANEL: CPT | Performed by: PHYSICIAN ASSISTANT

## 2019-12-18 PROCEDURE — 25000128 H RX IP 250 OP 636: Performed by: ANESTHESIOLOGY

## 2019-12-18 PROCEDURE — 40000306 ZZH STATISTIC PRE PROC ASSESS II: Performed by: SURGERY

## 2019-12-18 PROCEDURE — 96376 TX/PRO/DX INJ SAME DRUG ADON: CPT

## 2019-12-18 PROCEDURE — 99217 ZZC OBSERVATION CARE DISCHARGE: CPT | Performed by: PHYSICIAN ASSISTANT

## 2019-12-18 PROCEDURE — 47562 LAPAROSCOPIC CHOLECYSTECTOMY: CPT | Mod: AS | Performed by: PHYSICIAN ASSISTANT

## 2019-12-18 PROCEDURE — 25000132 ZZH RX MED GY IP 250 OP 250 PS 637: Performed by: SURGERY

## 2019-12-18 PROCEDURE — 71000027 ZZH RECOVERY PHASE 2 EACH 15 MINS: Performed by: SURGERY

## 2019-12-18 PROCEDURE — G0378 HOSPITAL OBSERVATION PER HR: HCPCS

## 2019-12-18 PROCEDURE — 36000060 ZZH SURGERY LEVEL 3 W FLUORO 1ST 30 MIN: Performed by: SURGERY

## 2019-12-18 PROCEDURE — 25000128 H RX IP 250 OP 636: Performed by: PHYSICIAN ASSISTANT

## 2019-12-18 PROCEDURE — 47562 LAPAROSCOPIC CHOLECYSTECTOMY: CPT | Performed by: SURGERY

## 2019-12-18 PROCEDURE — 25800030 ZZH RX IP 258 OP 636: Performed by: PHYSICIAN ASSISTANT

## 2019-12-18 PROCEDURE — 88304 TISSUE EXAM BY PATHOLOGIST: CPT | Mod: 26 | Performed by: SURGERY

## 2019-12-18 PROCEDURE — 25000128 H RX IP 250 OP 636: Performed by: NURSE ANESTHETIST, CERTIFIED REGISTERED

## 2019-12-18 PROCEDURE — 71000015 ZZH RECOVERY PHASE 1 LEVEL 2 EA ADDTL HR: Performed by: SURGERY

## 2019-12-18 PROCEDURE — 93010 ELECTROCARDIOGRAM REPORT: CPT | Performed by: INTERNAL MEDICINE

## 2019-12-18 RX ORDER — FENTANYL CITRATE 50 UG/ML
25-50 INJECTION, SOLUTION INTRAMUSCULAR; INTRAVENOUS
Status: DISCONTINUED | OUTPATIENT
Start: 2019-12-18 | End: 2019-12-18 | Stop reason: HOSPADM

## 2019-12-18 RX ORDER — ONDANSETRON 2 MG/ML
4 INJECTION INTRAMUSCULAR; INTRAVENOUS EVERY 30 MIN PRN
Status: COMPLETED | OUTPATIENT
Start: 2019-12-18 | End: 2019-12-18

## 2019-12-18 RX ORDER — PROPOFOL 10 MG/ML
INJECTION, EMULSION INTRAVENOUS PRN
Status: DISCONTINUED | OUTPATIENT
Start: 2019-12-18 | End: 2019-12-18

## 2019-12-18 RX ORDER — LABETALOL HYDROCHLORIDE 5 MG/ML
10 INJECTION, SOLUTION INTRAVENOUS
Status: DISCONTINUED | OUTPATIENT
Start: 2019-12-18 | End: 2019-12-18 | Stop reason: HOSPADM

## 2019-12-18 RX ORDER — FENTANYL CITRATE 50 UG/ML
INJECTION, SOLUTION INTRAMUSCULAR; INTRAVENOUS PRN
Status: DISCONTINUED | OUTPATIENT
Start: 2019-12-18 | End: 2019-12-18

## 2019-12-18 RX ORDER — LIDOCAINE HYDROCHLORIDE 10 MG/ML
INJECTION, SOLUTION INFILTRATION; PERINEURAL PRN
Status: DISCONTINUED | OUTPATIENT
Start: 2019-12-18 | End: 2019-12-18

## 2019-12-18 RX ORDER — NEOSTIGMINE METHYLSULFATE 1 MG/ML
VIAL (ML) INJECTION PRN
Status: DISCONTINUED | OUTPATIENT
Start: 2019-12-18 | End: 2019-12-18

## 2019-12-18 RX ORDER — NALOXONE HYDROCHLORIDE 0.4 MG/ML
.1-.4 INJECTION, SOLUTION INTRAMUSCULAR; INTRAVENOUS; SUBCUTANEOUS
Status: DISCONTINUED | OUTPATIENT
Start: 2019-12-18 | End: 2019-12-18 | Stop reason: HOSPADM

## 2019-12-18 RX ORDER — IBUPROFEN 600 MG/1
600 TABLET, FILM COATED ORAL
Status: DISCONTINUED | OUTPATIENT
Start: 2019-12-18 | End: 2019-12-18 | Stop reason: HOSPADM

## 2019-12-18 RX ORDER — HYDROCODONE BITARTRATE AND ACETAMINOPHEN 5; 325 MG/1; MG/1
1 TABLET ORAL
Status: COMPLETED | OUTPATIENT
Start: 2019-12-18 | End: 2019-12-18

## 2019-12-18 RX ORDER — HYDRALAZINE HYDROCHLORIDE 20 MG/ML
2.5-5 INJECTION INTRAMUSCULAR; INTRAVENOUS EVERY 10 MIN PRN
Status: DISCONTINUED | OUTPATIENT
Start: 2019-12-18 | End: 2019-12-18 | Stop reason: HOSPADM

## 2019-12-18 RX ORDER — LIDOCAINE 40 MG/G
CREAM TOPICAL
Status: DISCONTINUED | OUTPATIENT
Start: 2019-12-18 | End: 2019-12-18 | Stop reason: HOSPADM

## 2019-12-18 RX ORDER — BUPIVACAINE HYDROCHLORIDE AND EPINEPHRINE 2.5; 5 MG/ML; UG/ML
INJECTION, SOLUTION INFILTRATION; PERINEURAL PRN
Status: DISCONTINUED | OUTPATIENT
Start: 2019-12-18 | End: 2019-12-18 | Stop reason: HOSPADM

## 2019-12-18 RX ORDER — ONDANSETRON 4 MG/1
4 TABLET, ORALLY DISINTEGRATING ORAL EVERY 30 MIN PRN
Status: COMPLETED | OUTPATIENT
Start: 2019-12-18 | End: 2019-12-18

## 2019-12-18 RX ORDER — DEXAMETHASONE SODIUM PHOSPHATE 4 MG/ML
INJECTION, SOLUTION INTRA-ARTICULAR; INTRALESIONAL; INTRAMUSCULAR; INTRAVENOUS; SOFT TISSUE PRN
Status: DISCONTINUED | OUTPATIENT
Start: 2019-12-18 | End: 2019-12-18

## 2019-12-18 RX ORDER — SODIUM CHLORIDE, SODIUM LACTATE, POTASSIUM CHLORIDE, CALCIUM CHLORIDE 600; 310; 30; 20 MG/100ML; MG/100ML; MG/100ML; MG/100ML
INJECTION, SOLUTION INTRAVENOUS CONTINUOUS
Status: DISCONTINUED | OUTPATIENT
Start: 2019-12-18 | End: 2019-12-18 | Stop reason: HOSPADM

## 2019-12-18 RX ORDER — GLYCOPYRROLATE 0.2 MG/ML
INJECTION, SOLUTION INTRAMUSCULAR; INTRAVENOUS PRN
Status: DISCONTINUED | OUTPATIENT
Start: 2019-12-18 | End: 2019-12-18

## 2019-12-18 RX ORDER — HYDROMORPHONE HYDROCHLORIDE 1 MG/ML
.3-.5 INJECTION, SOLUTION INTRAMUSCULAR; INTRAVENOUS; SUBCUTANEOUS EVERY 10 MIN PRN
Status: DISCONTINUED | OUTPATIENT
Start: 2019-12-18 | End: 2019-12-18 | Stop reason: HOSPADM

## 2019-12-18 RX ORDER — MEPERIDINE HYDROCHLORIDE 25 MG/ML
12.5 INJECTION INTRAMUSCULAR; INTRAVENOUS; SUBCUTANEOUS
Status: DISCONTINUED | OUTPATIENT
Start: 2019-12-18 | End: 2019-12-18 | Stop reason: HOSPADM

## 2019-12-18 RX ORDER — HYDROCODONE BITARTRATE AND ACETAMINOPHEN 5; 325 MG/1; MG/1
1-2 TABLET ORAL EVERY 4 HOURS PRN
Qty: 10 TABLET | Refills: 0 | Status: SHIPPED | OUTPATIENT
Start: 2019-12-18 | End: 2019-12-21

## 2019-12-18 RX ORDER — EPHEDRINE SULFATE 50 MG/ML
INJECTION, SOLUTION INTRAMUSCULAR; INTRAVENOUS; SUBCUTANEOUS PRN
Status: DISCONTINUED | OUTPATIENT
Start: 2019-12-18 | End: 2019-12-18

## 2019-12-18 RX ORDER — CLINDAMYCIN PHOSPHATE 900 MG/50ML
900 INJECTION, SOLUTION INTRAVENOUS
Status: COMPLETED | OUTPATIENT
Start: 2019-12-18 | End: 2019-12-18

## 2019-12-18 RX ADMIN — HYDROMORPHONE HYDROCHLORIDE 0.5 MG: 1 INJECTION, SOLUTION INTRAMUSCULAR; INTRAVENOUS; SUBCUTANEOUS at 07:47

## 2019-12-18 RX ADMIN — LIDOCAINE HYDROCHLORIDE 50 MG: 10 INJECTION, SOLUTION INFILTRATION; PERINEURAL at 15:43

## 2019-12-18 RX ADMIN — ONDANSETRON HYDROCHLORIDE 4 MG: 2 INJECTION, SOLUTION INTRAMUSCULAR; INTRAVENOUS at 15:44

## 2019-12-18 RX ADMIN — HYDROCODONE BITARTRATE AND ACETAMINOPHEN 2 TABLET: 5; 325 TABLET ORAL at 06:21

## 2019-12-18 RX ADMIN — ROCURONIUM BROMIDE 35 MG: 10 INJECTION INTRAVENOUS at 15:44

## 2019-12-18 RX ADMIN — MIDAZOLAM 2 MG: 1 INJECTION INTRAMUSCULAR; INTRAVENOUS at 15:30

## 2019-12-18 RX ADMIN — ISOSORBIDE MONONITRATE 90 MG: 60 TABLET, EXTENDED RELEASE ORAL at 07:55

## 2019-12-18 RX ADMIN — SODIUM CHLORIDE, POTASSIUM CHLORIDE, SODIUM LACTATE AND CALCIUM CHLORIDE: 600; 310; 30; 20 INJECTION, SOLUTION INTRAVENOUS at 15:30

## 2019-12-18 RX ADMIN — Medication 5 MG: at 16:08

## 2019-12-18 RX ADMIN — GLYCOPYRROLATE 0.8 MG: 0.2 INJECTION, SOLUTION INTRAMUSCULAR; INTRAVENOUS at 16:41

## 2019-12-18 RX ADMIN — Medication 10 MG: at 16:10

## 2019-12-18 RX ADMIN — FENTANYL CITRATE 50 MCG: 50 INJECTION, SOLUTION INTRAMUSCULAR; INTRAVENOUS at 17:53

## 2019-12-18 RX ADMIN — HYDROMORPHONE HYDROCHLORIDE 0.5 MG: 1 INJECTION, SOLUTION INTRAMUSCULAR; INTRAVENOUS; SUBCUTANEOUS at 00:01

## 2019-12-18 RX ADMIN — Medication 5 MG: at 16:01

## 2019-12-18 RX ADMIN — ONDANSETRON HYDROCHLORIDE 4 MG: 2 INJECTION, SOLUTION INTRAMUSCULAR; INTRAVENOUS at 19:04

## 2019-12-18 RX ADMIN — HYDROMORPHONE HYDROCHLORIDE 0.5 MG: 1 INJECTION, SOLUTION INTRAMUSCULAR; INTRAVENOUS; SUBCUTANEOUS at 13:17

## 2019-12-18 RX ADMIN — FENTANYL CITRATE 50 MCG: 50 INJECTION, SOLUTION INTRAMUSCULAR; INTRAVENOUS at 18:13

## 2019-12-18 RX ADMIN — FENTANYL CITRATE 100 MCG: 50 INJECTION, SOLUTION INTRAMUSCULAR; INTRAVENOUS at 15:44

## 2019-12-18 RX ADMIN — SODIUM CHLORIDE: 9 INJECTION, SOLUTION INTRAVENOUS at 06:22

## 2019-12-18 RX ADMIN — SODIUM CHLORIDE, POTASSIUM CHLORIDE, SODIUM LACTATE AND CALCIUM CHLORIDE: 600; 310; 30; 20 INJECTION, SOLUTION INTRAVENOUS at 16:11

## 2019-12-18 RX ADMIN — DEXAMETHASONE SODIUM PHOSPHATE 4 MG: 4 INJECTION, SOLUTION INTRA-ARTICULAR; INTRALESIONAL; INTRAMUSCULAR; INTRAVENOUS; SOFT TISSUE at 15:44

## 2019-12-18 RX ADMIN — GLYCOPYRROLATE 0.2 MG: 0.2 INJECTION, SOLUTION INTRAMUSCULAR; INTRAVENOUS at 15:43

## 2019-12-18 RX ADMIN — HYDROCODONE BITARTRATE AND ACETAMINOPHEN 1 TABLET: 5; 325 TABLET ORAL at 18:43

## 2019-12-18 RX ADMIN — PROPOFOL 150 MG: 10 INJECTION, EMULSION INTRAVENOUS at 15:44

## 2019-12-18 RX ADMIN — Medication 5 MG: at 16:41

## 2019-12-18 RX ADMIN — Medication 5 MG: at 16:13

## 2019-12-18 RX ADMIN — HYDROCODONE BITARTRATE AND ACETAMINOPHEN 2 TABLET: 5; 325 TABLET ORAL at 01:39

## 2019-12-18 RX ADMIN — ATENOLOL 50 MG: 50 TABLET ORAL at 07:55

## 2019-12-18 RX ADMIN — FENTANYL CITRATE 50 MCG: 50 INJECTION, SOLUTION INTRAMUSCULAR; INTRAVENOUS at 17:34

## 2019-12-18 RX ADMIN — CLINDAMYCIN PHOSPHATE 900 MG: 900 INJECTION, SOLUTION INTRAVENOUS at 15:50

## 2019-12-18 RX ADMIN — HYDROMORPHONE HYDROCHLORIDE 0.5 MG: 1 INJECTION, SOLUTION INTRAMUSCULAR; INTRAVENOUS; SUBCUTANEOUS at 10:18

## 2019-12-18 RX ADMIN — HYDROMORPHONE HYDROCHLORIDE 0.5 MG: 1 INJECTION, SOLUTION INTRAMUSCULAR; INTRAVENOUS; SUBCUTANEOUS at 17:45

## 2019-12-18 NOTE — DISCHARGE SUMMARY
Discharge Summary  Hospitalist Service    Kemal Rodriguez MRN# [id number]   YOB: 1970 Age: 49 year old     Date of Admission:  12/17/2019  Date of Discharge:  12/18/2019  Admitting Physician: Candace Corbett MD  Discharge Physician: Ruthann Glover PA-C  Discharging Service: Hospitalist Service     Primary Provider: Juanita Sorenson  Primary Care Physician Phone Number: [phone]         Discharge Diagnoses/Problem Oriented Hospital Course (Providers):    Kemal Rodriguez was admitted on 12/17/2019 by Candace Corbett MD and I would refer you to their history and physical. Briefly, patient presented from general surgery office with complaints of right upper quadrant pain and elevated LFTs. Repeat ultrasound shows cholelithiasis and sludge in the gallbladder. Surgery was consulted and will remove gallbladder this afternoon.   The following problems were addressed during his hospitalization:    #Intractable biliary colic  Intermittent RUQ pain for several months that became intractable on 12/16. US showed gallstones and sludge. General surgery consulted and will perform cholecystectomy today.   -Follow general surgery instructions at discharge    #G6PD deficiency  Reports only one episode of hemolysis when he was 11 years old. Hemoglobin is stable.    #HTN  Is able to resume all blood pressure medications at discharge           Code Status:      Full Code        Brief Hospital Stay Summary Sent Home With Patient in AVS:        Reason for your hospital stay      You were admitted for complaints of abdominal pain related to your   gallbladder.  You were seen by surgery who opted to remove your   gallbladder this afternoon.  Following the procedure you are doing well so   allowed to discharge home.  Please follow their instructions at discharge.                             Pending Results:        Unresulted Labs Ordered in the Past 30 Days of this Admission     No orders found for  last 31 day(s).            Discharge Instructions and Follow-Up:      Follow-up Appointments     Follow-up and recommended labs and tests       Follow up with primary care provider, Juanita Sorenson, within 7 days   for hospital follow- up.  The following labs/tests are recommended: repeat   liver function tests.               Discharge Disposition:      Discharged to home         Discharge Medications:        Current Discharge Medication List      CONTINUE these medications which have NOT CHANGED    Details   amLODIPine (NORVASC) 10 MG tablet TAKE ONE TABLET BY MOUTH EVERY DAY      aspirin 81 MG EC tablet Take 81 mg by mouth At Bedtime      atenolol (TENORMIN) 50 MG tablet Take 50 mg by mouth daily      atorvastatin (LIPITOR) 40 MG tablet TAKE 1 TABLET AT BEDTIME      Calcium Carb-Ergocalciferol 500-200 MG-UNIT TABS Take 1 tablet by mouth daily       cholecalciferol (VITAMIN D3) 125 MCG (5000 UT) TABS tablet Take 5,000 Units by mouth daily       diltiazem ER COATED BEADS (CARDIZEM CD/CARTIA XT) 240 MG 24 hr capsule Take 240 mg by mouth daily       HYDROcodone-acetaminophen (NORCO) 5-325 MG tablet Take 1-2 tablets by mouth every 6 hours as needed for breakthrough pain or severe pain  Qty: 15 tablet, Refills: 0      isosorbide mononitrate (IMDUR) 30 MG 24 hr tablet Take 90 mg by mouth daily       Multiple Vitamin (MULTI-VITAMINS) TABS Take 1 tablet by mouth daily       nicotine (NICODERM CQ) 21 MG/24HR 24 hr patch Place 1 patch onto the skin every 24 hours      nicotine polacrilex (NICORETTE) 4 MG gum Place 4 mg inside cheek as needed for smoking cessation      omega 3 1000 MG CAPS Take 1 g by mouth daily       sertraline (ZOLOFT) 100 MG tablet Take 200 mg by mouth At Bedtime       terazosin (HYTRIN) 1 MG capsule TAKE 1 CAPSULE AT BEDTIME               Allergies:         Allergies   Allergen Reactions     Quinine GI Disturbance and Nausea and Vomiting     Nausea & Vomiting       Penicillins      Sulfa Drugs             Consultations This Hospital Stay:      Consultation during this admission received from orthopedics and surgery         Condition and Physical on Discharge:      Discharge condition: Stable   Vitals: Blood pressure 114/78, pulse 63, temperature 98  F (36.7  C), temperature source Oral, resp. rate 16, height 1.829 m (6'), weight 115.1 kg (253 lb 12.8 oz), SpO2 96 %.     Constitutional: Alert and orientated x 3   Lungs: CTAB   Cardiovascular: RRR with no murmur   Abdomen: Bowel sounds are present with RUQ tenderness   Skin: No rash or open sores   Other:          Discharge Time:      Less than 30 minutes.        Image Results From This Hospital Stay (For Non-EPIC Providers):        Results for orders placed or performed during the hospital encounter of 12/17/19   CT Abdomen Pelvis w Contrast    Narrative    CT ABDOMEN/PELVIS WITH CONTRAST December 17, 2019 2:20 PM     HISTORY: Abdomen pain, elevated bilirubin and liver function tests.    TECHNIQUE: 100mL Isovue-370. CT images of the abdomen and pelvis  following nonionic intravenous contrast. Radiation dose for this scan  was reduced using automated exposure control, adjustment of the mA  and/or kV according to patient size, or iterative reconstruction  technique.    COMPARISON: None.    FINDINGS: There is mild diffuse decreased attenuation of the liver.  There is a subtle low-density lesion adjacent to the gallbladder that  measures approximately 1.1 cm (series 3, image 34). There is another  low-density lesion in the superior aspect of segment 2 that measures  1.8 cm (series 3, image 15). A gallstone is present. The spleen  appears normal. The pancreas, adrenal glands, and kidneys are  unremarkable. No pathologically enlarged abdominal or retroperitoneal  lymph nodes. The appendix is normal. No abnormal bowel distention,  free air, or ascites.    No pelvic adenopathy, free fluid, or mass. No worrisome bone lesions.      Impression    IMPRESSION:  1. Fatty  infiltration of the liver.  2. Low-attenuation liver lesions are poorly characterized on this  study but likely represent hemangiomas. Consider confirmation with  hepatic MRI.  3. Cholelithiasis.    ALEE HARRISON MD   MR Abdomen MRCP w/o & w Contrast    Narrative    MAGNETIC RESONANCE CHOLANGIOPANCREATOGRAPHY  12/17/2019 8:13 PM     HISTORY: Abnormal liver function tests (LFTs). Cholelithiasis.    COMPARISON: None.    TECHNIQUE: Multiplanar, multisequence images of the abdomen acquired  before and after administration of 10 mL Gadavist intravenous  contrast. MRCP images and coronal 3-D thin section T2-weighted MRCP  images through the biliary tree. 3D image reformatting was performed  on the acquisition scanner.    FINDINGS: There are gallstones in the gallbladder. There is no  significant gallbladder wall thickening. There is mild prominence of  the common bile duct that measures up to 7 mm. The intrahepatic ducts  are also mildly dilated. No evidence of choledocholithiasis.    The liver, spleen, pancreas, adrenal glands, and right kidney are  unremarkable. The left kidney is unremarkable with the exception of a  tiny cyst at the lower pole. No evidence of bowel obstruction or  ascites.      Impression    IMPRESSION:   1. Cholelithiasis without evidence of cholecystitis. No evidence of  choledocholithiasis. Mild prominence of the intra and extrahepatic  biliary tree, nonspecific.    ALEE HARRISON MD           Most Recent Lab Results In EPIC (For Non-EPIC Providers):    Most Recent 3 CBC's:  Recent Labs   Lab Test 12/18/19  0620 12/17/19  1339 12/16/19  0845   WBC 5.5 8.4 8.8   HGB 10.8* 10.8* 10.7*   * 108* 108*    339 310      Most Recent 3 BMP's:  Recent Labs   Lab Test 12/18/19  0620 12/17/19  1339 12/16/19  0845    138 137   POTASSIUM 3.7 3.4 3.9   CHLORIDE 106 104 106   CO2 28 27 27   BUN 8 9 8   CR 0.77 0.81 0.76   ANIONGAP 5 7 4   DALE 8.3* 9.0 8.5   GLC 95 82 118*     Most Recent 3  Troponin's:No lab results found.  Most Recent 3 INR's:No lab results found.  Most Recent 2 LFT's:  Recent Labs   Lab Test 12/18/19  0620 12/17/19  1339   AST 52* 79*   * 186*   ALKPHOS 83 93   BILITOTAL 1.9* 1.8*     Most Recent Cholesterol Panel:No lab results found.  Most Recent 6 Bacteria Isolates From Any Culture (See EPIC Reports for Culture Details):No lab results found.  Most Recent TSH, T4 and HgbA1c: No lab results found.

## 2019-12-18 NOTE — PLAN OF CARE
PRIMARY DIAGNOSIS: BILIARY COLIC/UNCOMPLICATED EARLY ACUTE CHOLECYSTITIS  OUTPATIENT/OBSERVATION GOALS TO BE MET BEFORE DISCHARGE:    1. Pain status: Improved. Pt needed IV dilaudid for pain 5/10 with no improvement from [place].   2. Stable vital signs and labs (if performed) at disposition: Yes  3. Tolerating adequate PO diet: Tolerating clears, now NPO.   4. Successful cholecystectomy or clear follow up plan with General Surgery team if immediate surgery not performed: General surgery to see patient in AM.   5. ADLs back to baseline?  Yes  6. Activity and level of assistance: Ambulating independently.  7. Barriers to discharge noted: Yes - gen surg consult, MRCP results    Pt is A&Ox4. VSS. Pt does report pain. IV dilaudid given. No nausea. NPO for general surgery consult in AM. Pt up ind. IVF. Will continue to monitor.     Discharge Planner Nurse   Safe discharge environment identified: Yes  Barriers to discharge: Yes - gen surg consult, MRCP results       Entered by: Gisell Morse 12/18/2019        Please review provider order for any additional goals.   Nurse to notify provider when observation goals have been met and patient is ready for discharge.

## 2019-12-18 NOTE — PLAN OF CARE
PRIMARY DIAGNOSIS: BILIARY COLIC/UNCOMPLICATED EARLY ACUTE CHOLECYSTITIS  OUTPATIENT/OBSERVATION GOALS TO BE MET BEFORE DISCHARGE:    1. Pain status: Improved-controlled with oral pain medications.  2. Stable vital signs and labs (if performed) at disposition: Yes  3. Tolerating adequate PO diet: Tolerating clears.  4. Successful cholecystectomy or clear follow up plan with General Surgery team if immediate surgery not performed No  5. ADLs back to baseline?  Yes  6. Activity and level of assistance: Ambulating independently.  7. Barriers to discharge noted: Yes - gen surg consult, MRCP results    Discharge Planner Nurse   Safe discharge environment identified: Yes  Barriers to discharge: Yes - gen surg consult, MRCP results       Entered by: Ellyn Haley 12/17/2019        VSS. Pain controlled with Norco. Tolerating clears without nausea. MRCP completed; results pending. Up ind; steady gait observed. IVF infusing. Plan: NPO @ 0000, general surgery consult    Please review provider order for any additional goals.   Nurse to notify provider when observation goals have been met and patient is ready for discharge.

## 2019-12-18 NOTE — ANESTHESIA POSTPROCEDURE EVALUATION
Patient: Kemal Rodriguez    Procedure(s):  CHOLECYSTECTOMY, LAPAROSCOPIC    Diagnosis:* No pre-op diagnosis entered *  Diagnosis Additional Information: No value filed.    Anesthesia Type:  General, ETT    Note:  Anesthesia Post Evaluation    Patient location during evaluation: PACU  Patient participation: Able to fully participate in evaluation  Level of consciousness: awake  Pain management: adequate  Airway patency: patent  Cardiovascular status: acceptable  Respiratory status: acceptable  Hydration status: acceptable  PONV: controlled     Anesthetic complications: None          Last vitals:  Vitals:    12/18/19 1705 12/18/19 1710 12/18/19 1715   BP: 131/60 129/65    Pulse: 78 76    Resp: 12 13    Temp:      SpO2: 97% 90% 92%         Electronically Signed By: William Pichardo MD  December 18, 2019  5:21 PM

## 2019-12-18 NOTE — ANESTHESIA CARE TRANSFER NOTE
Patient: Kemal Rodriguez    Procedure(s):  CHOLECYSTECTOMY, LAPAROSCOPIC    Diagnosis: * No pre-op diagnosis entered *  Diagnosis Additional Information: No value filed.    Anesthesia Type:   General, ETT     Note:  Airway :Face Mask  Patient transferred to:PACU  Comments: VSS.  Spontaneously breathing O2 per open face mask.  Report given to RN.Handoff Report: Identifed the Patient, Identified the Reponsible Provider, Reviewed the pertinent medical history, Discussed the surgical course, Reviewed Intra-OP anesthesia mangement and issues during anesthesia, Set expectations for post-procedure period and Allowed opportunity for questions and acknowledgement of understanding      Vitals: (Last set prior to Anesthesia Care Transfer)    CRNA VITALS  12/18/2019 1627 - 12/18/2019 1705      12/18/2019             SpO2:  99 %    Resp Rate (observed):  (!) 7                Electronically Signed By: SLADE Schaffer CRNA  December 18, 2019  5:05 PM

## 2019-12-18 NOTE — PROGRESS NOTES
Patient does have a history of MRSA per H&P 12/17/19.  Unsure of exact date and location of culture, but it appears to be [facility], 8/2010.  I cannot find three negative MRSA swabs that would allow discontinuation of the MRSA precautions at this time in the [facility] chart or in Care Everywhere.  .12/18/2019  .Princess Cárdenas  Infection Preventionist, [facility]

## 2019-12-18 NOTE — ANESTHESIA PREPROCEDURE EVALUATION
Anesthesia Pre-Procedure Evaluation    Patient: Kemal Rodriguez   MRN: [id number] : 1970          Preoperative Diagnosis: * No pre-op diagnosis entered *    Procedure(s):  CHOLECYSTECTOMY, LAPAROSCOPIC    History reviewed. No pertinent past medical history.  History reviewed. No pertinent surgical history.  Anesthesia Evaluation     . Pt has had prior anesthetic.     No history of anesthetic complications          ROS/MED HX    ENT/Pulmonary:     (+)sleep apnea, , . .    Neurologic:  - neg neurologic ROS     Cardiovascular:     (+) hypertension--CAD, --. : . . . :. .       METS/Exercise Tolerance:     Hematologic:     (+) Anemia, -      Musculoskeletal:  - neg musculoskeletal ROS       GI/Hepatic:  - neg GI/hepatic ROS       Renal/Genitourinary:     (+) BPH,       Endo:         Psychiatric:  - neg psychiatric ROS       Infectious Disease:  - neg infectious disease ROS       Malignancy:         Other:                          Physical Exam  Normal systems: cardiovascular and pulmonary    Airway   Mallampati: II  TM distance: >3 FB  Neck ROM: full    Dental     Cardiovascular       Pulmonary             Lab Results   Component Value Date    WBC 5.5 2019    HGB 10.8 (L) 2019    HCT 35.3 (L) 2019     2019     2019    POTASSIUM 3.7 2019    CHLORIDE 106 2019    CO2 28 2019    BUN 8 2019    CR 0.77 2019    GLC 95 2019    DALE 8.3 (L) 2019    ALBUMIN 3.9 2019    PROTTOTAL 6.8 2019     (H) 2019    AST 52 (H) 2019    ALKPHOS 83 2019    BILITOTAL 1.9 (H) 2019    LIPASE 133 2019       Preop Vitals  BP Readings from Last 3 Encounters:   19 115/63   19 130/86   19 136/85    Pulse Readings from Last 3 Encounters:   19 61   19 67   19 66      Resp Readings from Last 3 Encounters:   19 16   19 16   19 20    SpO2 Readings from Last 3  Encounters:   12/18/19 90%   12/17/19 96%   12/16/19 99%      Temp Readings from Last 1 Encounters:   12/18/19 97.1  F (36.2  C) (Temporal)    Ht Readings from Last 1 Encounters:   12/17/19 1.829 m (6')      Wt Readings from Last 1 Encounters:   12/17/19 115.1 kg (253 lb 12.8 oz)    Estimated body mass index is 34.42 kg/m  as calculated from the following:    Height as of this encounter: 1.829 m (6').    Weight as of this encounter: 115.1 kg (253 lb 12.8 oz).       Anesthesia Plan      History & Physical Review  History and physical reviewed and following examination; no interval change.    ASA Status:  3 .    NPO Status:  > 8 hours    Plan for General and ETT with Intravenous and Propofol induction. Maintenance will be Balanced.    PONV prophylaxis:  Ondansetron (or other 5HT-3) and Dexamethasone or Solumedrol       Postoperative Care  Postoperative pain management:  IV analgesics.      Consents  Anesthetic plan, risks, benefits and alternatives discussed with:  Patient.  Use of blood products discussed: Yes.   .                 Fracisco Cazares MD                    .

## 2019-12-18 NOTE — CONSULTS
Full consult yesterday in clinic, please see note dated 12/17/19    Admitted last night, states pain continues, no changes  CT abd pelv and MRCP reviewed no evidence of choledocholithiasis  LFTS trended down to his baseline (G6PD)  Requested OR time for today for lap patricia - 10:30am  Hopefully discharge postop if recovers well in PACU    Therese Crow MD

## 2019-12-18 NOTE — PLAN OF CARE
PRIMARY DIAGNOSIS: ACUTE PAIN  OUTPATIENT/OBSERVATION GOALS TO BE MET BEFORE DISCHARGE:  1. Pain Status: Improved but still requiring IV narcotics.     2. Return to near baseline physical activity: Yes     3. Cleared for discharge by consultants (if involved): No     Discharge Planner Nurse   Safe discharge environment identified: Yes  Barriers to discharge: Yes       Entered by: Cuate Mojica 12/18/2019      Pt alert and oriented x4. He is having pain 5/10 in his abdomen R side. Given iv dilaudid x3. He is up independent. NPO. NS @ 100ml/hr. Bili 1.9. General surgery following - surgery at 2:30 pm. VSS. Surgery transported pt to same day @ 1:30 pm, all belongings sent w/ pt.      /69 (BP Location: Left arm)   Pulse 61   Temp 97.9  F (36.6  C) (Oral)   Resp 16   Ht 1.829 m (6')   Wt 115.1 kg (253 lb 12.8 oz)   SpO2 95%   BMI 34.42 kg/m         Please review provider order for any additional goals.   Nurse to notify provider when observation goals have been met and patient is ready for discharge.

## 2019-12-18 NOTE — PLAN OF CARE
ROOM # 228    Living Situation (if not independent, order SW consult): Ind   Facility name:  : Kiersten (wife)    Activity level at baseline: Ind  Activity level on admit: Ind      Patient registered to observation; given Patient Bill of Rights; given the opportunity to ask questions about observation status and their plan of care.  Patient has been oriented to the observation room, bathroom and call light is in place.    Discussed discharge goals and expectations with patient/family.

## 2019-12-18 NOTE — OP NOTE
[facility] General Surgery Operative Note    Pre-operative diagnosis: symptomatic gallstones   Post-operative diagnosis: same   Procedure: laparoscopic cholecystectomy   Surgeon: Therese Crow MD   Assistant(s): Gerardo Garcia PA-C  The Physician Assistant was medically necessary for their expertise in prepping, camera management, suctioning, suturing and retraction.   Anesthesia: general   Estimated blood loss:  Specimen: 5 cc  gallbladder and contents               INDICATION FOR OPERATION: This is a 49 year old male who presented to my clinic yesterday with abdominal pain. He has been having RUQ pain for multiple weeks, was jaundiced on exam. He had been evaluated in the ED the day prior with bilirubin of 7.5 and findings of cholelithiasis on ultrasound. There was a normal CBD on ulltrasound but his level of hyperbilirubinemia, ongoing pain and acholic stools was concerning for choledocholithiasis. There was no findings of gallbladder wall thickening or pericholecystic fluid to indicate cholecystitis but there may be an element of that as well.  I instructed him to return to the ED yesterday for evaluation with repeat of his laboratory studies. His bilirubin had gone down to <2.0 and MRCP was done and did not show any choledocholithiasis. He does have a history of G6PD deficiency so would expect more mild hyperbilirubinemia - in the past have been in the 2.0 range. He was admitted and had continued pain today. We discussed laparoscopic cholecystectomy and the patient agreed to proceed after hearing the risks and benefits.    DESCRIPTION OF PROCEDURE:  The patient was taken to the operating room and placed on the table in supine position.  General endotracheal anesthesia was induced and the abdomen was prepped and draped in standard sterile fashion.  An incision above the umbilicus was made with a blade.  The incision was carried down to the fascia. The fascia was elevated with kocher clamps and the  fascia was incised in the midline with a blade.  The peritoneum was entered sharply.  0 Vicryl suture was placed at the extremes of the fascial incision.  The Janae trocar was introduced and the abdomen was insufflated with CO2.  A 5 mm trocar was placed in the subxiphoid position.  A 5 mm trocar was placed in the right upper quadrant, just below the costal margin at the midclavicular line.  Another was placed at the anterior axillary line just below the costal margin on the right.  The patient was placed in reverse Trendelenburg and right side up.  The gallbladder appeared mildly thickened, a lot of fat covering the gallbladder and some omental adhesions.  The fundus of the gallbladder was grasped and retracted cephalad. Omental adhesions were taken down with cautery.  The infundibulum was grasped and retracted laterally.  The peritoneum over the medial and lateral aspects of the triangle of Calot was taken down with the [place] dissector and modest amounts of Bovie electrocautery.  The cystic duct and artery were freed up from surrounding tissues.  The triangle of Calot was skeletonized revealing the critical view of safety.      The cystic artery and duct were each clipped twice proximally, once distally and transected with the scissors.  The gallbladder was then removed from the liver using the hook electrocautery.  There was spillage of bile from the gallbladder during dissection which was suctioned out. The gallbladder was passed into an Endocatch bag. We observed the right upper quadrant carefully for hemostasis.  Hemostasis was assured.  The abdomen was irrigated with saline. The trocar sites were anesthetized with local anesthetic.  Each of the trocars was removed under direct visualization.  There was no bleeding from any of these sites.  The Janae trocar was removed and the abdomen was evacuated of CO2. The gallbladder was removed through the umbilical trocar site. Additional 0 Vicryl was placed in the  fascia between the previously placed 0 vicryls and all were tied down to good effect.  The skin of the umbilical incision was anesthetized with local anesthetic.  All of the incisions were closed with interrupted 4-0 Vicryl subcuticular sutures and Steri-Strips.  The patient tolerated the procedure well.  Sponge and instrument counts were correct.      FINDINGS: distended gallbladder    Therese Crow MD

## 2019-12-18 NOTE — PLAN OF CARE
PRIMARY DIAGNOSIS: BILIARY COLIC/UNCOMPLICATED EARLY ACUTE CHOLECYSTITIS  OUTPATIENT/OBSERVATION GOALS TO BE MET BEFORE DISCHARGE:    1. Pain status: Improved. Pt needed IV dilaudid for pain 5/10 with no improvement from [place].   2. Stable vital signs and labs (if performed) at disposition: Yes  3. Tolerating adequate PO diet: Tolerating clears, now NPO.   4. Successful cholecystectomy or clear follow up plan with General Surgery team if immediate surgery not performed: General surgery to see patient in AM.   5. ADLs back to baseline?  Yes  6. Activity and level of assistance: Ambulating independently.  7. Barriers to discharge noted: Yes - gen surg consult, MRCP results    Pt is A&Ox4. VSS. Pt does report pain. IV dilaudid given. No nausea. NPO for general surgery consult in AM. Pt up ind. IVF. Will continue to monitor.     Discharge Planner Nurse   Safe discharge environment identified: Yes  Barriers to discharge: Yes - gen surg consult, MRCP results       Entered by: Gisell Morse 12/18/2019        Please review provider order for any additional goals.   Nurse to notify provider when observation goals have been met and patient is ready for discharge.

## 2019-12-18 NOTE — PLAN OF CARE
PRIMARY DIAGNOSIS: ACUTE PAIN  OUTPATIENT/OBSERVATION GOALS TO BE MET BEFORE DISCHARGE:  1. Pain Status: Improved but still requiring IV narcotics.    2. Return to near baseline physical activity: Yes    3. Cleared for discharge by consultants (if involved): No    Discharge Planner Nurse   Safe discharge environment identified: Yes  Barriers to discharge: Yes       Entered by: Cuate Mojica 12/18/2019     Pt alert and oriented x4. He is having pain 5/10 in his abdomen R side. Given iv dilaudid x2. He is up independent. NPO. NS @ 100ml/hr. Bili 1.9. General surgery following - surgery at 2:30 pm. VSS.    /69 (BP Location: Left arm)   Pulse 61   Temp 97.9  F (36.6  C) (Oral)   Resp 16   Ht 1.829 m (6')   Wt 115.1 kg (253 lb 12.8 oz)   SpO2 95%   BMI 34.42 kg/m         Please review provider order for any additional goals.   Nurse to notify provider when observation goals have been met and patient is ready for discharge.

## 2019-12-19 LAB — INTERPRETATION ECG - MUSE: NORMAL

## 2019-12-19 NOTE — DISCHARGE INSTRUCTIONS
HOME CARE FOLLOWING LAPAROSCOPIC CHOLECYSTECTOMY  BRIONNA Thompson, KAREN Yip R. O Donnell, J. Shaheen    INCISIONAL CARE:  Replace the bandage over your incisions until all drainage stops, or if more comfortable to have in place.  If present, leave the steri-strips (white paper tapes) in place for 14 days after surgery.  If Dermabond (a type of skin glue) is present, leave in place until it wears/flakes off.     BATHING:  Avoid baths for 1 week after surgery.  Showers are okay.  You may wash your hair at any time.  Gently pat your incisions dry after bathing.    ACTIVITY:  Light Activity -- you may immediately be up and about as tolerated.  Driving -- you may drive when comfortable and off narcotic pain medications.  Light Work -- resume when comfortable off pain medications.  (If you can drive, you probably can work.)  Strenuous Work/Activity -- limit lifting to 20 pounds for 2 weeks.  Progressively increase with time.  Active Sports (running, biking, etc.) -- cautiously resume after 2 weeks.    DISCOMFORT:  Use norco pain medications as prescribed.  Take the pain medication with some food, when possible, to minimize side effects.  Intermittent use of ice packs at the incision sites may help during the first 48 hours.  Expect gradual improvement.  You may experience shoulder pain, which is due to the air placed within your abdomen during the procedure.  This is temporary and usually passes within 2 days.    DIET:  Drink plenty of fluids.  While taking pain medications, increase dietary fiber or add a fiber supplementation like Metamucil or Citrucel to help prevent constipation - a possible side effect of pain medications.  It is not uncommon to experience some bowel changes (loose stools or constipation) after surgery.  Your body has to adapt to you no longer having a gall bladder.  To help minimize this side effect, avoid fatty foods for the first week after surgery.  You may then  slowly increase the amount of fatty foods in your diet.      NAUSEA:  If nauseated from the anesthetic/pain meds; rest in bed, get up cautiously with assistance, and drink clear liquids (juice, tea, broth).    RETURN APPOINTMENT:  Schedule a follow-up visit 2-3 weeks post-op.  Office Phone:  [phone]     CONTACT US IF THE FOLLOWING DEVELOPS:   1. A fever that is above 101     2. If there is a large amount of drainage, bleeding, or swelling.   3. Severe pain that is not relieved by your prescription.   4. Drainage that is thick, cloudy, yellow, green or white.   5. Any other questions not answered by  Frequently Asked Questions  sheet.      FREQUENTLY ASKED QUESTIONS:    Q:  How should my incision look?    A:  Normally your incision will appear slightly swollen with light redness directly along the incision itself as it heals.  It may feel like a bump or ridge as the healing/scarring happens, and over time (3-4 months) this bump or ridge feeling should slowly go away.  In general, clear or pink watery drainage can be normal at first as your incision heals, but should decrease over time.    Q:  How do I know if my incision is infected?  A:  Look at your incision for signs of infection, like redness around the incision spreading to surrounding skin, or drainage of cloudy or foul-smelling drainage.  If you feel warm, check your temperature to see if you are running a fever.    **If any of these things occur, please notify the nurse at our office.  We may need you to come into the office for an incision check.      Q:  How do I take care of my incision?  A:  If you have a dressing in place - Starting the day after surgery, replace the dressing 1-2 times a day until there is no further drainage from the incision.  At that time, a dressing is no longer needed.  Try to minimize tape on the skin if irritation is occurring at the tape sites.  If you have significant irritation from tape on the skin, please call the office  to discuss other method of dressing your incision.    Small pieces of tape called  steri-strips  may be present directly overlying your incision; these may be removed 10 days after surgery unless otherwise specified by your surgeon.  If these tapes start to loosen at the ends, you may trim them back until they fall off or are removed.    A:  If you had  Dermabond  tissue glue used as a dressing (this causes your incision to look shiny with a clear covering over it) - This type of dressing wears off with time and does not require more dressings over the top unless it is draining around the glue as it wears off.  Do not apply ointments or lotions over the incisions until the glue has completely worn off.    Q:  There is a piece of tape or a sticky  lead  still on my skin.  Can I remove this?  A:  Sometimes the sticky  leads  used for monitoring during surgery or for evaluation in the emergency department are not all removed while you are in the hospital.  These sometimes have a tab or metal dot on them.  You can easily remove these on your own, like taking off a band-aid.  If there is a gel substance under the  lead , simply wipe/clean it off with a washcloth or paper towel.      Q:  What can I do to minimize constipation (very hard stools, or lack of stools)?  A:  Stay well hydrated.  Increase your dietary fiber intake or take a fiber supplement -with plenty of water.  Walk around frequently.  You may consider an over-the-counter stool-softener.  Your Pharmacist can assist you with choosing one that is stocked at your pharmacy.  Constipation is also one of the most common side effects of pain medication.  If you are using pain medication, be pro-active and try to PREVENT problems with constipation by taking the steps above BEFORE constipation becomes a problem.    Q:  What do I do if I need more pain medications?  A:  Call the office to receive refills.  Be aware that certain pain meds cannot be called into a  pharmacy and actually require a paper prescription.  A change may be made in your pain med as you progress thru your recovery period or if you have side effects to certain meds.    --Pain meds are NOT refilled after 5pm on weekdays, and NOT AT ALL on the weekends, so please look ahead to prevent problems.      Q:  Why am I having a hard time sleeping now that I am at home?  A:  Many medications you receive while you are in the hospital can impact your sleep for a number of days after your surgery/hospitalization.  Decreased level of activity and naps during the day may also make sleeping at night difficult.  Try to minimize day-time naps, and get up frequently during the day to walk around your home during your recovery time.  Sleep aides may be of some help, but are not recommended for long-term use.      Q:  I am having some back discomfort.  What should I do?  A:  This may be related to certain positioning that was required for your surgery, extended periods of time in bed, or other changes in your overall activity level.  You may try ice, heat, acetaminophen, or ibuprofen to treat this temporarily.  Note that many pain medications have acetaminophen in them and would state this on the prescription bottle.  Be sure not to exceed the maximum of 4000mg per day of acetaminophen.     **If the pain you are having does not resolve, is severe, or is a flare of back pain you have had on other occasions prior to surgery, please contact your primary physician for further recommendations or for an appointment to be examined at their office.    Q:  Why am I having headaches?  A:  Headaches can be caused by many things:  caffeine withdrawal, use of pain meds, dehydration, high blood pressure, lack of sleep, over-activity/exhaustion, flare-up of usual migraine headaches.  If you feel this is related to muscle tension (a band-like feeling around the head, or a pressure at the low-back of the head) you may try ice or heat to  this area.  You may need to drink more fluids (try electrolyte drink like Gatorade), rest, or take your usual migraine medications.   **If your headaches do not resolve, worsen, are accompanied by other symptoms, or if your blood pressure is high, please call your primary physician for recommendation and/or examination.    Q:  I am unable to urinate.  What do I do?  A:  A small percentage of people can have difficulty urinating initially after surgery.  This includes being able to urinate only a very small amount at a time and feeling discomfort or pressure in the very low abdomen.  This is called  urinary retention , and is actually an urgent situation.  Proceed to your nearest Emergency department for evaluation (not an Urgent Care Center).  Sometimes the bladder does not work correctly after certain medications you receive during surgery, or related to certain procedures.  You may need to have a catheter placed until your bladder recovers.  When planning to go to an Emergency department, it may help to call the ER to let them know you are coming in for this problem after a surgery.  This may help you get in quicker to be evaluated.  **If you have symptoms of a urinary tract infection, please contact your primary physician for the proper evaluation and treatment.    If you have other questions, please call the office Monday thru Friday between 8am and 5pm to discuss with the nurse or physician assistant.  #[phone]  There is a surgeon ON CALL on weekday evenings and over the weekend in case of urgent need only, and may be contacted at the same number.    If you are having an emergency, call 911 or proceed to your nearest emergency department.      GENERAL ANESTHESIA OR SEDATION ADULT DISCHARGE INSTRUCTIONS   SPECIAL PRECAUTIONS FOR 24 HOURS AFTER SURGERY    IT IS NOT UNUSUAL TO FEEL LIGHT-HEADED OR FAINT, UP TO 24 HOURS AFTER SURGERY OR WHILE TAKING PAIN MEDICATION.  IF YOU HAVE THESE SYMPTOMS; SIT FOR A FEW  MINUTES BEFORE STANDING AND HAVE SOMEONE ASSIST YOU WHEN YOU GET UP TO WALK OR USE THE BATHROOM.    YOU SHOULD REST AND RELAX FOR THE NEXT 24 HOURS AND YOU MUST MAKE ARRANGEMENTS TO HAVE SOMEONE STAY WITH YOU FOR AT LEAST 24 HOURS AFTER YOUR DISCHARGE.  AVOID HAZARDOUS AND STRENUOUS ACTIVITIES.  DO NOT MAKE IMPORTANT DECISIONS FOR 24 HOURS.    DO NOT DRIVE ANY VEHICLE OR OPERATE MECHANICAL EQUIPMENT FOR 24 HOURS FOLLOWING THE END OF YOUR SURGERY.  EVEN THOUGH YOU MAY FEEL NORMAL, YOUR REACTIONS MAY BE AFFECTED BY THE MEDICATION YOU HAVE RECEIVED.    DO NOT DRINK ALCOHOLIC BEVERAGES FOR 24 HOURS FOLLOWING YOUR SURGERY.    DRINK CLEAR LIQUIDS (APPLE JUICE, GINGER ALE, 7-UP, BROTH, ETC.).  PROGRESS TO YOUR REGULAR DIET AS YOU FEEL ABLE.    YOU MAY HAVE A DRY MOUTH, A SORE THROAT, MUSCLES ACHES OR TROUBLE SLEEPING.  THESE SHOULD GO AWAY AFTER 24 HOURS.    CALL YOUR DOCTOR FOR ANY OF THE FOLLOWING:  SIGNS OF INFECTION (FEVER, GROWING TENDERNESS AT THE SURGERY SITE, A LARGE AMOUNT OF DRAINAGE OR BLEEDING, SEVERE PAIN, FOUL-SMELLING DRAINAGE, REDNESS OR SWELLING.    IT HAS BEEN OVER 8 TO 10 HOURS SINCE SURGERY AND YOU ARE STILL NOT ABLE TO URINATE (PASS WATER).          You received one HYDROcodone-acetaminophen 5-325 MG tablet (NORCO)  at 6:45 pm.

## 2019-12-19 NOTE — PROGRESS NOTES
Sats initially dipped to 87 88%. As pt felt more awake sats improved to 92 -93% Ok'd by Dr Toney to discharge. Pt stated he always uses his cpap when sleeping.

## 2019-12-20 LAB — COPATH REPORT: NORMAL

## 2021-01-13 ENCOUNTER — HOSPITAL ENCOUNTER (OUTPATIENT)
Facility: CLINIC | Age: 51
Setting detail: OBSERVATION
Discharge: HOME | End: 2021-01-15
Attending: EMERGENCY MEDICINE | Admitting: INTERNAL MEDICINE
Payer: COMMERCIAL

## 2021-01-13 ENCOUNTER — APPOINTMENT (OUTPATIENT)
Dept: CT IMAGING | Facility: CLINIC | Age: 51
End: 2021-01-13
Attending: EMERGENCY MEDICINE
Payer: COMMERCIAL

## 2021-01-13 DIAGNOSIS — R07.89 OTHER CHEST PAIN: ICD-10-CM

## 2021-01-13 DIAGNOSIS — K21.00 GASTROESOPHAGEAL REFLUX DISEASE WITH ESOPHAGITIS WITHOUT HEMORRHAGE: Primary | ICD-10-CM

## 2021-01-13 LAB
ANION GAP SERPL CALCULATED.3IONS-SCNC: 3 MMOL/L (ref 3–14)
BASOPHILS # BLD AUTO: 0.1 10E9/L (ref 0–0.2)
BASOPHILS NFR BLD AUTO: 1 %
BUN SERPL-MCNC: 12 MG/DL (ref 7–30)
CALCIUM SERPL-MCNC: 8.2 MG/DL (ref 8.5–10.1)
CHLORIDE SERPL-SCNC: 105 MMOL/L (ref 94–109)
CO2 SERPL-SCNC: 29 MMOL/L (ref 20–32)
CREAT SERPL-MCNC: 0.72 MG/DL (ref 0.66–1.25)
DIFFERENTIAL METHOD BLD: ABNORMAL
EOSINOPHIL # BLD AUTO: 0.1 10E9/L (ref 0–0.7)
EOSINOPHIL NFR BLD AUTO: 0.6 %
ERYTHROCYTE [DISTWIDTH] IN BLOOD BY AUTOMATED COUNT: 10.7 % (ref 10–15)
GFR SERPL CREATININE-BSD FRML MDRD: >90 ML/MIN/{1.73_M2}
GLUCOSE SERPL-MCNC: 102 MG/DL (ref 70–99)
HCT VFR BLD AUTO: 35.6 % (ref 40–53)
HGB BLD-MCNC: 11.3 G/DL (ref 13.3–17.7)
IMM GRANULOCYTES # BLD: 0 10E9/L (ref 0–0.4)
IMM GRANULOCYTES NFR BLD: 0.4 %
INR PPP: 1.04 (ref 0.86–1.14)
LYMPHOCYTES # BLD AUTO: 1.9 10E9/L (ref 0.8–5.3)
LYMPHOCYTES NFR BLD AUTO: 20.3 %
MCH RBC QN AUTO: 34 PG (ref 26.5–33)
MCHC RBC AUTO-ENTMCNC: 31.7 G/DL (ref 31.5–36.5)
MCV RBC AUTO: 107 FL (ref 78–100)
MONOCYTES # BLD AUTO: 0.5 10E9/L (ref 0–1.3)
MONOCYTES NFR BLD AUTO: 5.5 %
NEUTROPHILS # BLD AUTO: 6.7 10E9/L (ref 1.6–8.3)
NEUTROPHILS NFR BLD AUTO: 72.2 %
NRBC # BLD AUTO: 0 10*3/UL
NRBC BLD AUTO-RTO: 0 /100
PLATELET # BLD AUTO: 334 10E9/L (ref 150–450)
POTASSIUM SERPL-SCNC: 3.6 MMOL/L (ref 3.4–5.3)
RBC # BLD AUTO: 3.32 10E12/L (ref 4.4–5.9)
SODIUM SERPL-SCNC: 137 MMOL/L (ref 133–144)
TROPONIN I SERPL-MCNC: <0.015 UG/L (ref 0–0.04)
WBC # BLD AUTO: 9.3 10E9/L (ref 4–11)

## 2021-01-13 PROCEDURE — 71260 CT THORAX DX C+: CPT

## 2021-01-13 PROCEDURE — 85610 PROTHROMBIN TIME: CPT | Performed by: EMERGENCY MEDICINE

## 2021-01-13 PROCEDURE — 99285 EMERGENCY DEPT VISIT HI MDM: CPT | Mod: 25

## 2021-01-13 PROCEDURE — 87635 SARS-COV-2 COVID-19 AMP PRB: CPT | Performed by: EMERGENCY MEDICINE

## 2021-01-13 PROCEDURE — 85025 COMPLETE CBC W/AUTO DIFF WBC: CPT | Performed by: EMERGENCY MEDICINE

## 2021-01-13 PROCEDURE — 96375 TX/PRO/DX INJ NEW DRUG ADDON: CPT

## 2021-01-13 PROCEDURE — G0378 HOSPITAL OBSERVATION PER HR: HCPCS

## 2021-01-13 PROCEDURE — 84484 ASSAY OF TROPONIN QUANT: CPT | Performed by: EMERGENCY MEDICINE

## 2021-01-13 PROCEDURE — C9803 HOPD COVID-19 SPEC COLLECT: HCPCS

## 2021-01-13 PROCEDURE — 36415 COLL VENOUS BLD VENIPUNCTURE: CPT | Performed by: INTERNAL MEDICINE

## 2021-01-13 PROCEDURE — 250N000009 HC RX 250: Performed by: EMERGENCY MEDICINE

## 2021-01-13 PROCEDURE — 250N000011 HC RX IP 250 OP 636: Performed by: EMERGENCY MEDICINE

## 2021-01-13 PROCEDURE — 99220 PR INITIAL OBSERVATION CARE,LEVEL III: CPT | Performed by: INTERNAL MEDICINE

## 2021-01-13 PROCEDURE — 96361 HYDRATE IV INFUSION ADD-ON: CPT

## 2021-01-13 PROCEDURE — 258N000003 HC RX IP 258 OP 636: Performed by: EMERGENCY MEDICINE

## 2021-01-13 PROCEDURE — 93005 ELECTROCARDIOGRAM TRACING: CPT | Mod: 76

## 2021-01-13 PROCEDURE — 80048 BASIC METABOLIC PNL TOTAL CA: CPT | Performed by: EMERGENCY MEDICINE

## 2021-01-13 PROCEDURE — 93005 ELECTROCARDIOGRAM TRACING: CPT

## 2021-01-13 PROCEDURE — 250N000013 HC RX MED GY IP 250 OP 250 PS 637: Performed by: EMERGENCY MEDICINE

## 2021-01-13 PROCEDURE — 96374 THER/PROPH/DIAG INJ IV PUSH: CPT | Mod: 59

## 2021-01-13 RX ORDER — LIDOCAINE 40 MG/G
CREAM TOPICAL
Status: DISCONTINUED | OUTPATIENT
Start: 2021-01-13 | End: 2021-01-15 | Stop reason: HOSPADM

## 2021-01-13 RX ORDER — NITROGLYCERIN 0.4 MG/1
0.4 TABLET SUBLINGUAL EVERY 5 MIN PRN
Status: COMPLETED | OUTPATIENT
Start: 2021-01-13 | End: 2021-01-13

## 2021-01-13 RX ORDER — ONDANSETRON 2 MG/ML
4 INJECTION INTRAMUSCULAR; INTRAVENOUS ONCE
Status: COMPLETED | OUTPATIENT
Start: 2021-01-13 | End: 2021-01-13

## 2021-01-13 RX ORDER — MORPHINE SULFATE 4 MG/ML
4 INJECTION, SOLUTION INTRAMUSCULAR; INTRAVENOUS ONCE
Status: COMPLETED | OUTPATIENT
Start: 2021-01-13 | End: 2021-01-13

## 2021-01-13 RX ORDER — SODIUM CHLORIDE 9 MG/ML
INJECTION, SOLUTION INTRAVENOUS CONTINUOUS
Status: DISCONTINUED | OUTPATIENT
Start: 2021-01-13 | End: 2021-01-14

## 2021-01-13 RX ORDER — IOPAMIDOL 755 MG/ML
80 INJECTION, SOLUTION INTRAVASCULAR ONCE
Status: COMPLETED | OUTPATIENT
Start: 2021-01-13 | End: 2021-01-13

## 2021-01-13 RX ADMIN — MORPHINE SULFATE 4 MG: 4 INJECTION INTRAVENOUS at 21:55

## 2021-01-13 RX ADMIN — SODIUM CHLORIDE 1000 ML: 9 INJECTION, SOLUTION INTRAVENOUS at 21:40

## 2021-01-13 RX ADMIN — LIDOCAINE HYDROCHLORIDE 30 ML: 20 SOLUTION ORAL; TOPICAL at 22:57

## 2021-01-13 RX ADMIN — NITROGLYCERIN 0.4 MG: 0.4 TABLET SUBLINGUAL at 21:40

## 2021-01-13 RX ADMIN — NITROGLYCERIN 0.4 MG: 0.4 TABLET SUBLINGUAL at 21:47

## 2021-01-13 RX ADMIN — FAMOTIDINE 20 MG: 10 INJECTION, SOLUTION INTRAVENOUS at 23:29

## 2021-01-13 RX ADMIN — ONDANSETRON 4 MG: 2 INJECTION INTRAMUSCULAR; INTRAVENOUS at 21:41

## 2021-01-13 RX ADMIN — IOPAMIDOL 80 ML: 755 INJECTION, SOLUTION INTRAVENOUS at 22:07

## 2021-01-13 RX ADMIN — NITROGLYCERIN 0.4 MG: 0.4 TABLET SUBLINGUAL at 21:56

## 2021-01-13 ASSESSMENT — ENCOUNTER SYMPTOMS
NEUROLOGICAL NEGATIVE: 1
ABDOMINAL PAIN: 0
VOMITING: 0
CHILLS: 0
SORE THROAT: 0
NAUSEA: 0
DIARRHEA: 0
CHEST TIGHTNESS: 1
CONFUSION: 1
RHINORRHEA: 0
FEVER: 0
SHORTNESS OF BREATH: 1

## 2021-01-13 NOTE — LETTER
January 15, 2021    Kemal Rodriguez  [address] [facility] [address]      To Whom It May Concern:    Kemal Rodriguez was seen on 1/13-1/15/21. Please excuse him  until 1/18/21 due to illness. The patient is able to return to work without any restrictions.         Sincerely,        Evelyn Early PA-C

## 2021-01-14 ENCOUNTER — APPOINTMENT (OUTPATIENT)
Dept: NUCLEAR MEDICINE | Facility: CLINIC | Age: 51
End: 2021-01-14
Attending: INTERNAL MEDICINE
Payer: COMMERCIAL

## 2021-01-14 ENCOUNTER — APPOINTMENT (OUTPATIENT)
Dept: CARDIOLOGY | Facility: CLINIC | Age: 51
End: 2021-01-14
Attending: INTERNAL MEDICINE
Payer: COMMERCIAL

## 2021-01-14 LAB
APTT PPP: 28 SEC (ref 22–37)
CHOLEST SERPL-MCNC: 134 MG/DL
CV STRESS MAX HR HE: 83
ERYTHROCYTE [DISTWIDTH] IN BLOOD BY AUTOMATED COUNT: 10.8 % (ref 10–15)
HCT VFR BLD AUTO: 35 % (ref 40–53)
HDLC SERPL-MCNC: 34 MG/DL
HGB BLD-MCNC: 11.1 G/DL (ref 13.3–17.7)
INTERPRETATION ECG - MUSE: NORMAL
LABORATORY COMMENT REPORT: NORMAL
LDLC SERPL CALC-MCNC: 79 MG/DL
MCH RBC QN AUTO: 34.4 PG (ref 26.5–33)
MCHC RBC AUTO-ENTMCNC: 31.7 G/DL (ref 31.5–36.5)
MCV RBC AUTO: 108 FL (ref 78–100)
NONHDLC SERPL-MCNC: 100 MG/DL
NUC STRESS EJECTION FRACTION: 70 %
PLATELET # BLD AUTO: 312 10E9/L (ref 150–450)
RATE PRESSURE PRODUCT: NORMAL
RBC # BLD AUTO: 3.23 10E12/L (ref 4.4–5.9)
SARS-COV-2 RNA RESP QL NAA+PROBE: NEGATIVE
SPECIMEN SOURCE: NORMAL
STRESS ECHO BASELINE DIASTOLIC HE: 83
STRESS ECHO BASELINE HR: 59
STRESS ECHO BASELINE SYSTOLIC BP: 135
STRESS ECHO CALCULATED PERCENT HR: 49 %
STRESS ECHO LAST STRESS DIASTOLIC BP: 73
STRESS ECHO LAST STRESS SYSTOLIC BP: 134
STRESS ECHO TARGET HR: 169
STRESS/REST PERFUSION RATIO: 1.06
TRIGL SERPL-MCNC: 107 MG/DL
TROPONIN I SERPL-MCNC: <0.015 UG/L (ref 0–0.04)
TROPONIN I SERPL-MCNC: <0.015 UG/L (ref 0–0.04)
TSH SERPL DL<=0.005 MIU/L-ACNC: 3.07 MU/L (ref 0.4–4)
UFH PPP CHRO-ACNC: <0.1 IU/ML
WBC # BLD AUTO: 6.8 10E9/L (ref 4–11)

## 2021-01-14 PROCEDURE — 93016 CV STRESS TEST SUPVJ ONLY: CPT | Performed by: INTERNAL MEDICINE

## 2021-01-14 PROCEDURE — G0378 HOSPITAL OBSERVATION PER HR: HCPCS

## 2021-01-14 PROCEDURE — 78452 HT MUSCLE IMAGE SPECT MULT: CPT

## 2021-01-14 PROCEDURE — 93005 ELECTROCARDIOGRAM TRACING: CPT | Mod: 59

## 2021-01-14 PROCEDURE — 250N000011 HC RX IP 250 OP 636: Performed by: INTERNAL MEDICINE

## 2021-01-14 PROCEDURE — 84484 ASSAY OF TROPONIN QUANT: CPT | Performed by: INTERNAL MEDICINE

## 2021-01-14 PROCEDURE — 999N000157 HC STATISTIC RCP TIME EA 10 MIN

## 2021-01-14 PROCEDURE — 96365 THER/PROPH/DIAG IV INF INIT: CPT | Mod: 59

## 2021-01-14 PROCEDURE — 250N000013 HC RX MED GY IP 250 OP 250 PS 637: Performed by: PHYSICIAN ASSISTANT

## 2021-01-14 PROCEDURE — 93018 CV STRESS TEST I&R ONLY: CPT | Performed by: INTERNAL MEDICINE

## 2021-01-14 PROCEDURE — 96361 HYDRATE IV INFUSION ADD-ON: CPT

## 2021-01-14 PROCEDURE — 93017 CV STRESS TEST TRACING ONLY: CPT

## 2021-01-14 PROCEDURE — 250N000013 HC RX MED GY IP 250 OP 250 PS 637: Performed by: INTERNAL MEDICINE

## 2021-01-14 PROCEDURE — 36415 COLL VENOUS BLD VENIPUNCTURE: CPT | Performed by: INTERNAL MEDICINE

## 2021-01-14 PROCEDURE — 99217 PR OBSERVATION CARE DISCHARGE: CPT | Performed by: PHYSICIAN ASSISTANT

## 2021-01-14 PROCEDURE — 84443 ASSAY THYROID STIM HORMONE: CPT | Performed by: INTERNAL MEDICINE

## 2021-01-14 PROCEDURE — 85730 THROMBOPLASTIN TIME PARTIAL: CPT | Performed by: INTERNAL MEDICINE

## 2021-01-14 PROCEDURE — 80061 LIPID PANEL: CPT | Performed by: INTERNAL MEDICINE

## 2021-01-14 PROCEDURE — 343N000001 HC RX 343: Performed by: INTERNAL MEDICINE

## 2021-01-14 PROCEDURE — 96376 TX/PRO/DX INJ SAME DRUG ADON: CPT | Mod: 59

## 2021-01-14 PROCEDURE — 96366 THER/PROPH/DIAG IV INF ADDON: CPT

## 2021-01-14 PROCEDURE — C9113 INJ PANTOPRAZOLE SODIUM, VIA: HCPCS | Performed by: INTERNAL MEDICINE

## 2021-01-14 PROCEDURE — 78452 HT MUSCLE IMAGE SPECT MULT: CPT | Mod: 26 | Performed by: INTERNAL MEDICINE

## 2021-01-14 PROCEDURE — A9502 TC99M TETROFOSMIN: HCPCS | Performed by: INTERNAL MEDICINE

## 2021-01-14 PROCEDURE — 250N000011 HC RX IP 250 OP 636

## 2021-01-14 PROCEDURE — 85027 COMPLETE CBC AUTOMATED: CPT | Performed by: INTERNAL MEDICINE

## 2021-01-14 PROCEDURE — 93010 ELECTROCARDIOGRAM REPORT: CPT | Performed by: INTERNAL MEDICINE

## 2021-01-14 PROCEDURE — 99207 PR APP CREDIT; MD BILLING SHARED VISIT: CPT | Performed by: INTERNAL MEDICINE

## 2021-01-14 PROCEDURE — 85520 HEPARIN ASSAY: CPT | Performed by: INTERNAL MEDICINE

## 2021-01-14 PROCEDURE — 258N000003 HC RX IP 258 OP 636: Performed by: EMERGENCY MEDICINE

## 2021-01-14 RX ORDER — AMINOPHYLLINE 25 MG/ML
INJECTION, SOLUTION INTRAVENOUS
Status: DISPENSED
Start: 2021-01-14 | End: 2021-01-14

## 2021-01-14 RX ORDER — DILTIAZEM HYDROCHLORIDE 120 MG/1
240 CAPSULE, COATED, EXTENDED RELEASE ORAL DAILY
Status: DISCONTINUED | OUTPATIENT
Start: 2021-01-14 | End: 2021-01-14

## 2021-01-14 RX ORDER — NALOXONE HYDROCHLORIDE 0.4 MG/ML
0.2 INJECTION, SOLUTION INTRAMUSCULAR; INTRAVENOUS; SUBCUTANEOUS
Status: DISCONTINUED | OUTPATIENT
Start: 2021-01-14 | End: 2021-01-15 | Stop reason: HOSPADM

## 2021-01-14 RX ORDER — HYDROXYZINE HYDROCHLORIDE 25 MG/1
25 TABLET, FILM COATED ORAL 3 TIMES DAILY PRN
Status: DISCONTINUED | OUTPATIENT
Start: 2021-01-14 | End: 2021-01-15 | Stop reason: HOSPADM

## 2021-01-14 RX ORDER — REGADENOSON 0.08 MG/ML
0.4 INJECTION, SOLUTION INTRAVENOUS ONCE
Status: COMPLETED | OUTPATIENT
Start: 2021-01-14 | End: 2021-01-14

## 2021-01-14 RX ORDER — ISOSORBIDE MONONITRATE 30 MG/1
30 TABLET, EXTENDED RELEASE ORAL DAILY
Status: DISCONTINUED | OUTPATIENT
Start: 2021-01-14 | End: 2021-01-14

## 2021-01-14 RX ORDER — NITROGLYCERIN 0.4 MG/1
0.4 TABLET SUBLINGUAL EVERY 5 MIN PRN
Status: DISCONTINUED | OUTPATIENT
Start: 2021-01-14 | End: 2021-01-15 | Stop reason: HOSPADM

## 2021-01-14 RX ORDER — AMLODIPINE BESYLATE 10 MG/1
10 TABLET ORAL DAILY
COMMUNITY

## 2021-01-14 RX ORDER — ATORVASTATIN CALCIUM 40 MG/1
40 TABLET, FILM COATED ORAL AT BEDTIME
Status: DISCONTINUED | OUTPATIENT
Start: 2021-01-14 | End: 2021-01-15 | Stop reason: HOSPADM

## 2021-01-14 RX ORDER — DILTIAZEM HYDROCHLORIDE 180 MG/1
180 CAPSULE, COATED, EXTENDED RELEASE ORAL DAILY
Status: DISCONTINUED | OUTPATIENT
Start: 2021-01-14 | End: 2021-01-15 | Stop reason: HOSPADM

## 2021-01-14 RX ORDER — CAFFEINE CITRATE 20 MG/ML
60 SOLUTION INTRAVENOUS
Status: DISCONTINUED | OUTPATIENT
Start: 2021-01-14 | End: 2021-01-15 | Stop reason: HOSPADM

## 2021-01-14 RX ORDER — AMLODIPINE BESYLATE 5 MG/1
5 TABLET ORAL DAILY
Status: DISCONTINUED | OUTPATIENT
Start: 2021-01-14 | End: 2021-01-15 | Stop reason: HOSPADM

## 2021-01-14 RX ORDER — REGADENOSON 0.08 MG/ML
INJECTION, SOLUTION INTRAVENOUS
Status: COMPLETED
Start: 2021-01-14 | End: 2021-01-14

## 2021-01-14 RX ORDER — ACYCLOVIR 200 MG/1
0-1 CAPSULE ORAL
Status: DISCONTINUED | OUTPATIENT
Start: 2021-01-14 | End: 2021-01-15 | Stop reason: HOSPADM

## 2021-01-14 RX ORDER — CHOLECALCIFEROL (VITAMIN D3) 50 MCG
1 TABLET ORAL DAILY
COMMUNITY

## 2021-01-14 RX ORDER — ACETAMINOPHEN 650 MG/1
650 SUPPOSITORY RECTAL EVERY 4 HOURS PRN
Status: DISCONTINUED | OUTPATIENT
Start: 2021-01-14 | End: 2021-01-15 | Stop reason: HOSPADM

## 2021-01-14 RX ORDER — NALOXONE HYDROCHLORIDE 0.4 MG/ML
0.4 INJECTION, SOLUTION INTRAMUSCULAR; INTRAVENOUS; SUBCUTANEOUS
Status: DISCONTINUED | OUTPATIENT
Start: 2021-01-14 | End: 2021-01-15 | Stop reason: HOSPADM

## 2021-01-14 RX ORDER — ALBUTEROL SULFATE 90 UG/1
2 AEROSOL, METERED RESPIRATORY (INHALATION) EVERY 5 MIN PRN
Status: DISCONTINUED | OUTPATIENT
Start: 2021-01-14 | End: 2021-01-15 | Stop reason: HOSPADM

## 2021-01-14 RX ORDER — LIFITEGRAST 50 MG/ML
1 SOLUTION/ DROPS OPHTHALMIC 2 TIMES DAILY
COMMUNITY
Start: 2020-12-12 | End: 2023-08-28

## 2021-01-14 RX ORDER — NICOTINE 21 MG/24HR
1 PATCH, TRANSDERMAL 24 HOURS TRANSDERMAL DAILY PRN
Status: DISCONTINUED | OUTPATIENT
Start: 2021-01-14 | End: 2021-01-15 | Stop reason: HOSPADM

## 2021-01-14 RX ORDER — ASPIRIN 81 MG/1
81 TABLET ORAL AT BEDTIME
Status: DISCONTINUED | OUTPATIENT
Start: 2021-01-14 | End: 2021-01-15 | Stop reason: HOSPADM

## 2021-01-14 RX ORDER — MORPHINE SULFATE 2 MG/ML
2 INJECTION, SOLUTION INTRAMUSCULAR; INTRAVENOUS ONCE
Status: COMPLETED | OUTPATIENT
Start: 2021-01-14 | End: 2021-01-14

## 2021-01-14 RX ORDER — SUCRALFATE ORAL 1 G/10ML
1 SUSPENSION ORAL
Status: DISCONTINUED | OUTPATIENT
Start: 2021-01-14 | End: 2021-01-15 | Stop reason: HOSPADM

## 2021-01-14 RX ORDER — ASCORBIC ACID 500 MG
500 TABLET ORAL DAILY
COMMUNITY

## 2021-01-14 RX ORDER — DILTIAZEM HYDROCHLORIDE 180 MG/1
180 CAPSULE, EXTENDED RELEASE ORAL DAILY
COMMUNITY
Start: 2020-08-11 | End: 2021-08-11

## 2021-01-14 RX ORDER — CYCLOBENZAPRINE HCL 10 MG
10 TABLET ORAL EVERY 8 HOURS PRN
Status: DISCONTINUED | OUTPATIENT
Start: 2021-01-14 | End: 2021-01-15 | Stop reason: HOSPADM

## 2021-01-14 RX ORDER — MAGNESIUM HYDROXIDE/ALUMINUM HYDROXICE/SIMETHICONE 120; 1200; 1200 MG/30ML; MG/30ML; MG/30ML
30 SUSPENSION ORAL EVERY 4 HOURS PRN
Status: DISCONTINUED | OUTPATIENT
Start: 2021-01-14 | End: 2021-01-15 | Stop reason: HOSPADM

## 2021-01-14 RX ORDER — ACETAMINOPHEN 325 MG/1
650 TABLET ORAL EVERY 4 HOURS PRN
Status: DISCONTINUED | OUTPATIENT
Start: 2021-01-14 | End: 2021-01-15 | Stop reason: HOSPADM

## 2021-01-14 RX ORDER — OXYCODONE HYDROCHLORIDE 5 MG/1
5 TABLET ORAL EVERY 4 HOURS PRN
Status: DISCONTINUED | OUTPATIENT
Start: 2021-01-14 | End: 2021-01-15 | Stop reason: HOSPADM

## 2021-01-14 RX ORDER — LIDOCAINE 40 MG/G
CREAM TOPICAL
Status: DISCONTINUED | OUTPATIENT
Start: 2021-01-14 | End: 2021-01-15 | Stop reason: HOSPADM

## 2021-01-14 RX ORDER — AMLODIPINE BESYLATE 10 MG/1
10 TABLET ORAL DAILY
Status: DISCONTINUED | OUTPATIENT
Start: 2021-01-14 | End: 2021-01-14

## 2021-01-14 RX ORDER — AMINOPHYLLINE 25 MG/ML
50-100 INJECTION, SOLUTION INTRAVENOUS
Status: DISCONTINUED | OUTPATIENT
Start: 2021-01-14 | End: 2021-01-15 | Stop reason: HOSPADM

## 2021-01-14 RX ORDER — HEPARIN SODIUM 10000 [USP'U]/100ML
0-5000 INJECTION, SOLUTION INTRAVENOUS CONTINUOUS
Status: DISCONTINUED | OUTPATIENT
Start: 2021-01-14 | End: 2021-01-14

## 2021-01-14 RX ADMIN — OXYCODONE HYDROCHLORIDE 5 MG: 5 TABLET ORAL at 19:41

## 2021-01-14 RX ADMIN — SODIUM CHLORIDE: 9 INJECTION, SOLUTION INTRAVENOUS at 10:11

## 2021-01-14 RX ADMIN — SODIUM CHLORIDE: 9 INJECTION, SOLUTION INTRAVENOUS at 02:13

## 2021-01-14 RX ADMIN — NICOTINE 1 PATCH: 21 PATCH, EXTENDED RELEASE TRANSDERMAL at 16:03

## 2021-01-14 RX ADMIN — TETROFOSMIN 33 MCI.: 1.38 INJECTION, POWDER, LYOPHILIZED, FOR SOLUTION INTRAVENOUS at 13:20

## 2021-01-14 RX ADMIN — HEPARIN SODIUM 1200 UNITS/HR: 10000 INJECTION, SOLUTION INTRAVENOUS at 06:21

## 2021-01-14 RX ADMIN — TETROFOSMIN 10.5 MCI.: 1.38 INJECTION, POWDER, LYOPHILIZED, FOR SOLUTION INTRAVENOUS at 10:38

## 2021-01-14 RX ADMIN — ACETAMINOPHEN 650 MG: 325 TABLET, FILM COATED ORAL at 02:21

## 2021-01-14 RX ADMIN — ASPIRIN 81 MG: 81 TABLET ORAL at 21:59

## 2021-01-14 RX ADMIN — REGADENOSON 0.4 MG: 0.08 INJECTION, SOLUTION INTRAVENOUS at 13:09

## 2021-01-14 RX ADMIN — MORPHINE SULFATE 2 MG: 2 INJECTION, SOLUTION INTRAMUSCULAR; INTRAVENOUS at 04:46

## 2021-01-14 RX ADMIN — ATORVASTATIN CALCIUM 40 MG: 40 TABLET, FILM COATED ORAL at 21:59

## 2021-01-14 RX ADMIN — PANTOPRAZOLE SODIUM 40 MG: 40 INJECTION, POWDER, FOR SOLUTION INTRAVENOUS at 09:42

## 2021-01-14 RX ADMIN — SUCRALFATE 1 G: 1 SUSPENSION ORAL at 21:59

## 2021-01-14 RX ADMIN — SUCRALFATE 1 G: 1 SUSPENSION ORAL at 16:53

## 2021-01-14 RX ADMIN — AMLODIPINE BESYLATE 5 MG: 5 TABLET ORAL at 16:03

## 2021-01-14 ASSESSMENT — MIFFLIN-ST. JEOR: SCORE: 2028.35

## 2021-01-14 NOTE — ED NOTES
[facility]  ED Nurse Handoff Report    Kemal Rodriguez is a 50 year old male   ED Chief complaint: Chest Pain  . ED Diagnosis:   Final diagnoses:   Other chest pain     Allergies:   Allergies   Allergen Reactions     Quinine GI Disturbance and Nausea and Vomiting     Nausea & Vomiting       Penicillins      Sulfa Drugs        Code Status: Full Code  Activity level - Baseline/Home:  Independent. Activity Level - Current:   Independent. Lift room needed: No. Bariatric: No   Needed: No   Isolation: No. Infection: Not Applicable.     Vital Signs:   Vitals:    01/13/21 2145 01/13/21 2150 01/13/21 2220 01/13/21 2230   BP: 115/70 115/70 112/64 123/77   Pulse: 64 61 63 58   Resp: 10 19 18 13   Temp:       TempSrc:       SpO2: 94% 98% 100% 97%       Cardiac Rhythm:  ,      Pain level: 0-10 Pain Scale: 6  Patient confused: No. Patient Falls Risk: No.   Elimination Status: Has voided   Patient Report - Initial Complaint: Chest Pain. Focused Assessment: Chest pain and pressure radiating into left jaw.   Tests Performed: labs,CT. Abnormal Results:   Labs Ordered and Resulted from Time of ED Arrival Up to the Time of Departure from the ED   CBC WITH PLATELETS DIFFERENTIAL - Abnormal; Notable for the following components:       Result Value    RBC Count 3.32 (*)     Hemoglobin 11.3 (*)     Hematocrit 35.6 (*)      (*)     MCH 34.0 (*)     All other components within normal limits   BASIC METABOLIC PANEL - Abnormal; Notable for the following components:    Glucose 102 (*)     Calcium 8.2 (*)     All other components within normal limits   TROPONIN I   INR   PULSE OXIMETRY NURSING   CARDIAC CONTINUOUS MONITORING   PERIPHERAL IV CATHETER     CT Aortic Survey w Contrast   Final Result   IMPRESSION:   1.  No aortic dissection or other acute aortic abnormality in the chest, abdomen, or pelvis.   2.  Diffuse esophageal wall thickening may represent esophagitis please correlate clinically.         .    Treatments provided: See MAR, fluids  Family Comments: Not present  OBS brochure/video discussed/provided to patient:  Yes  ED Medications:   Medications   lidocaine 1 % 0.1-1 mL (has no administration in time range)   lidocaine (LMX4) cream (has no administration in time range)   sodium chloride (PF) 0.9% PF flush 3 mL (has no administration in time range)   sodium chloride (PF) 0.9% PF flush 3 mL (has no administration in time range)   0.9% sodium chloride BOLUS (1,000 mLs Intravenous New Bag 1/13/21 2140)     Followed by   sodium chloride 0.9% infusion (has no administration in time range)   ondansetron (ZOFRAN) injection 4 mg (4 mg Intravenous Given 1/13/21 2141)   nitroGLYcerin (NITROSTAT) sublingual tablet 0.4 mg (0.4 mg Sublingual Given 1/13/21 2156)   morphine (PF) injection 4 mg (4 mg Intravenous Given 1/13/21 2155)   iopamidol (ISOVUE-370) solution 80 mL (80 mLs Intravenous Given 1/13/21 2207)   sodium chloride (PF) 0.9% PF flush 60 mL (60 mLs Intravenous Given 1/13/21 2207)   lidocaine (XYLOCAINE) 2 % 15 mL, alum & mag hydroxide-simethicone (MAALOX) 15 mL GI Cocktail (30 mLs Oral Given 1/13/21 2257)     Drips infusing:  No  For the majority of the shift, the patient's behavior Green. Interventions performed were N/A.    Sepsis treatment initiated: No     Patient tested for COVID 19 prior to admission: YES    ED Nurse Name/Phone Number: Adelaida Traylor RN,   11:17 PM    RECEIVING UNIT ED HANDOFF REVIEW    Above ED Nurse Handoff Report was reviewed: Yes  Reviewed by: Samir Lyons on January 14, 2021 at 12:57 AM

## 2021-01-14 NOTE — PROGRESS NOTES
[facility]    Hospitalist Progress Note  Name: Kemal Rodriguez    MRN: [id number]  Provider:  Rebekah Early PA-C  Date of Service: 01/14/2021    Assessment & Plan   Summary of Stay: Kemal Rodriguez is an 50 year old male with PMH significant for HTN, dyslipidemia, coronary vasospasm (documented on coronary cath in 2016), SHELBY, h/o chewing tobacco, h/o polysubstance abuse including methamphetamine and alcohol (reports sobriety for the last 10 years), obesity, and G6PD deficiency who was admitted on 1/13/21 for further evaluation of chest pain.     #Chest pain: presented with chest pain with radiation into back and jaw that started at 18:30 on 1/13. Minimal improvement after receiving nitroglycerin and ASA. He had some improvement with IV morphine. Serial troponin levels negative. Telemetry showed sinus rhythm overnight. Due to persistent chest pain overnight the cross cover provider evaluated the patient and started him on IV Heparin gtt. Refusing trial of Imdur due to inability to take Viagra with this, reports previously taking for 3.5 years without significant improvement in his coronary vasospasm. Primary cardiologist is Dr. Morse at [facility].    - Lexiscan on 1/14 showed diaphragmatic attenuation, less likely small area of nontransmural infarct and no convincing ischemia  - stop Heparin gtt  - symptoms possibly related to esophagitis on CT scan, but unclear, patient is still feeling unwell with ongoing pain with concern for discharging home due to living alone   - continue IV Protonix  - trial Carafate with meals  - consider musculoskeletal component with trial of as needed tylenol, muscle relaxer, atarax, and low dose oxycodone (would limit if able due to polysubstance abuse history)      #Evidence of esophagitis on CT: incidental finding of diffuse esophageal wall thickening on CT which may represent esophagitis. No associated burning sensation with presenting chest pain per  patient.   - IV Protonix and carafate with meals      #HTN: stable, continue PTA Amlodipine and Diltiazem     #Dyslipidemia: lipid panel on 1/15 WNL, continue PTA statin therapy     #SHELBY: continue nightly CPAP use     #Tobacco abuse: trying to cut back since he knows this can contribute to his coronary vasospasm. Currently down to <1 can of chew per day.   - Nicotine patch available    COVID status: tested negative on 1/13/21  DVT Prophylaxis: Ambulate every shift  Code Status: Full Code  Disposition: Expected discharge tomorrow if symptoms improving     Rebekah Early PA-C  [facility]    Interval History   Patient reports ongoing chest pain with radiation straight into his back and the feeling as if an elephant is sitting on his chest. Does not have a burning sensation in his chest. He notes some shortness of breath at baseline due to being deconditioned. He states he did not get much sleep last night.  He reports feeling slightly lightheaded with standing.  Denies nausea, vomiting, abdominal pain, chills, or myalgias.  The patient discloses that he has a history of methamphetamine use and alcohol abuse but has been sober for 10 years.  Denies any recent heavy lifting or straining.  He reports recent increase stressors including divorce 1 year ago and starting a new job 6 months ago.  The patient states he was previously on Imdur for coronary vasospasm but stopped in order to be taking Viagra.  The patient expresses concern for ongoing chest discomfort even with a normal stress test and is fearful for discharging home since he lives alone.    -Data reviewed today: I reviewed all new labs and imaging reports over the last 24 hours.    Physical Exam   Temp: 98.1  F (36.7  C) Temp src: Oral BP: 129/78 Pulse: 61   Resp: 18 SpO2: 96 % O2 Device: None (Room air)    Vitals:    01/14/21 0115   Weight: 113.5 kg (250 lb 4.8 oz)     Vital Signs with Ranges  Temp:  [96.1  F (35.6  C)-98.4  F (36.9  C)] 98.1  F (36.7   C)  Pulse:  [58-80] 61  Resp:  [8-28] 18  BP: (112-146)/() 129/78  SpO2:  [92 %-100 %] 96 %  No intake/output data recorded.    GEN:  Alert, oriented x 3, appears comfortable laying in bed, NAD.  HEENT:  Normocephalic/atraumatic, no scleral icterus, no nasal discharge, mouth moist.  CV:  Regular rate and rhythm, no murmur or JVD.  S1 + S2 noted, no S3 or S4.  CHEST: no reproducible tenderness with palpation.   LUNGS:  Clear to auscultation bilaterally without rales/rhonchi/wheezing/retractions.  Symmetric chest rise on inhalation noted.  ABD:  Active bowel sounds, soft, non-tender/non-distended.  No rebound/guarding/rigidity.  EXT:  No edema.  No cyanosis.  No acute joint synovitis noted.  SKIN:  Dry to touch, no exanthems noted in the visualized areas.    Medications     heparin Stopped (01/14/21 1043)     sodium chloride Stopped (01/14/21 1043)       amLODIPine  5 mg Oral Daily     aspirin  81 mg Oral At Bedtime     atorvastatin  40 mg Oral At Bedtime     diltiazem ER COATED BEADS  180 mg Oral Daily     isosorbide mononitrate  30 mg Oral Daily     nicotine   Transdermal Q8H     pantoprazole (PROTONIX) IV  40 mg Intravenous Daily with breakfast     sodium chloride (PF)  3 mL Intracatheter Q8H     sodium chloride (PF)  3 mL Intracatheter Q8H     Data   Results for orders placed or performed during the hospital encounter of 01/13/21   CT Aortic Survey w Contrast     Status: None    Narrative    EXAM: CTA AORTIC SURVEY W CONTRAST  LOCATION: [facility]  DATE/TIME: 1/13/2021 10:04 PM    INDICATION: ; chest pain radiating to jaw and back, eval dissection;  COMPARISON: CT abdomen pelvis 12/17/2019  TECHNIQUE: CT angiogram chest abdomen pelvis during arterial phase of injection of IV contrast. 2D and 3D MIP reconstructions were performed by the CT technologist. Dose reduction techniques were used.   CONTRAST: 80mL Isovue-370    FINDINGS:   CT ANGIOGRAM CHEST, ABDOMEN, AND PELVIS: No thoracic or  abdominal aortic dissection. No significant atherosclerotic calcification or significant atheromatous plaque. Normal three-vessel aortic arch with widely patent normal caliber arch branch vessels.   No significant coronary arterial calcification or plaque proximally. Celiac, superior mesenteric, bilateral renal, and inferior mesenteric arteries all appear normal. Bilateral common, internal, external, and common femoral arteries appear normal.    LUNGS AND PLEURA: No pulmonary mass, consolidation, or suspicious pulmonary nodule. No pleural effusion or pneumothorax.    MEDIASTINUM/AXILLAE: There is diffuse esophageal wall thickening. Small hiatal hernia suggested. No adenopathy or mass. Cardiac chambers unremarkable. No pericardial effusion.    HEPATOBILIARY: Prior cholecystectomy. No suspicious liver lesion.    PANCREAS: Normal.    SPLEEN: Normal.    ADRENAL GLANDS: Normal.    KIDNEYS/BLADDER: Normal.    BOWEL: Normal.    LYMPH NODES: Normal.    PELVIC ORGANS: Normal.    MUSCULOSKELETAL: Normal.      Impression    IMPRESSION:  1.  No aortic dissection or other acute aortic abnormality in the chest, abdomen, or pelvis.  2.  Diffuse esophageal wall thickening may represent esophagitis please correlate clinically.     CBC with platelets differential     Status: Abnormal   Result Value Ref Range    WBC 9.3 4.0 - 11.0 10e9/L    RBC Count 3.32 (L) 4.4 - 5.9 10e12/L    Hemoglobin 11.3 (L) 13.3 - 17.7 g/dL    Hematocrit 35.6 (L) 40.0 - 53.0 %     (H) 78 - 100 fl    MCH 34.0 (H) 26.5 - 33.0 pg    MCHC 31.7 31.5 - 36.5 g/dL    RDW 10.7 10.0 - 15.0 %    Platelet Count 334 150 - 450 10e9/L    Diff Method Automated Method     % Neutrophils 72.2 %    % Lymphocytes 20.3 %    % Monocytes 5.5 %    % Eosinophils 0.6 %    % Basophils 1.0 %    % Immature Granulocytes 0.4 %    Nucleated RBCs 0 0 /100    Absolute Neutrophil 6.7 1.6 - 8.3 10e9/L    Absolute Lymphocytes 1.9 0.8 - 5.3 10e9/L    Absolute Monocytes 0.5 0.0 - 1.3 10e9/L     Absolute Eosinophils 0.1 0.0 - 0.7 10e9/L    Absolute Basophils 0.1 0.0 - 0.2 10e9/L    Abs Immature Granulocytes 0.0 0 - 0.4 10e9/L    Absolute Nucleated RBC 0.0    Basic metabolic panel     Status: Abnormal   Result Value Ref Range    Sodium 137 133 - 144 mmol/L    Potassium 3.6 3.4 - 5.3 mmol/L    Chloride 105 94 - 109 mmol/L    Carbon Dioxide 29 20 - 32 mmol/L    Anion Gap 3 3 - 14 mmol/L    Glucose 102 (H) 70 - 99 mg/dL    Urea Nitrogen 12 7 - 30 mg/dL    Creatinine 0.72 0.66 - 1.25 mg/dL    GFR Estimate >90 >60 mL/min/[1.73_m2]    GFR Estimate If Black >90 >60 mL/min/[1.73_m2]    Calcium 8.2 (L) 8.5 - 10.1 mg/dL   Troponin I     Status: None   Result Value Ref Range    Troponin I ES <0.015 0.000 - 0.045 ug/L   INR     Status: None   Result Value Ref Range    INR 1.04 0.86 - 1.14   Asymptomatic SARS-CoV-2 COVID-19 Virus (Coronavirus) by PCR     Status: None    Specimen: Nasopharyngeal   Result Value Ref Range    SARS-CoV-2 Virus Specimen Source Nasopharyngeal     SARS-CoV-2 PCR Result NEGATIVE     SARS-CoV-2 PCR Comment (Note)    Lipid panel reflex to direct LDL     Status: Abnormal   Result Value Ref Range    Cholesterol 134 <200 mg/dL    Triglycerides 107 <150 mg/dL    HDL Cholesterol 34 (L) >39 mg/dL    LDL Cholesterol Calculated 79 <100 mg/dL    Non HDL Cholesterol 100 <130 mg/dL   TSH     Status: None   Result Value Ref Range    TSH 3.07 0.40 - 4.00 mU/L   Troponin I     Status: None   Result Value Ref Range    Troponin I ES <0.015 0.000 - 0.045 ug/L   Troponin I     Status: None   Result Value Ref Range    Troponin I ES <0.015 0.000 - 0.045 ug/L   Partial thromboplastin time     Status: None   Result Value Ref Range    PTT 28 22 - 37 sec   CBC with platelets     Status: Abnormal   Result Value Ref Range    WBC 6.8 4.0 - 11.0 10e9/L    RBC Count 3.23 (L) 4.4 - 5.9 10e12/L    Hemoglobin 11.1 (L) 13.3 - 17.7 g/dL    Hematocrit 35.0 (L) 40.0 - 53.0 %     (H) 78 - 100 fl    MCH 34.4 (H) 26.5 - 33.0  pg    MCHC 31.7 31.5 - 36.5 g/dL    RDW 10.8 10.0 - 15.0 %    Platelet Count 312 150 - 450 10e9/L   Heparin Unfractionated Anti Xa Level     Status: None   Result Value Ref Range    Heparin Unfractionated Anti Xa Level <0.10 IU/mL   EKG 12 lead     Status: None   Result Value Ref Range    Interpretation ECG Click View Image link to view waveform and result    EKG 12 lead     Status: None   Result Value Ref Range    Interpretation ECG Click View Image link to view waveform and result    EKG 12-lead, tracing only     Status: None   Result Value Ref Range    Interpretation ECG Click View Image link to view waveform and result    NM MPI w Lexiscan     Status: None   Result Value Ref Range    Target      Baseline Systolic      Baseline Diastolic BP 83     Last Stress Systolic      Last Stress Diastolic BP 73     Baseline HR 59     Max HR 83     Calculated Percent HR 49 %    Rate Pressure Product 11,122.0     Stress/rest perfusion ratio 1.06     Left Ventricular EF 70 %    Narrative       The nuclear stress test is probably negative for inducible myocardial   ischemia or infarction.     Nuclear Study Quality: The  images demonstrate   diaphragmatic attenuation.  Less likely would be small area of   nontransmural infarct.  No convincing ischemia.     The left ventricular ejection fraction at stress is 70%.     There is no prior study for comparison.

## 2021-01-14 NOTE — PROGRESS NOTES
An EKG was completed on the pt, with no complications noted during the procedure.    George Arnold, RT on 1/14/2021 at 4:35 AM

## 2021-01-14 NOTE — ED PROVIDER NOTES
History   Chief Complaint  Chest Pain    HPI   Kemal Rodriguez is a 50 year old male with a history of hypertension, anxiety, and atherosclerotic heart disease of native Coronary artery with documented spasm who was BIBA for evaluation of chest pressure that onset around 1.5 hours ago which radiates into his back. He also endorses aching in his left jaw. Kemal was given 3 nitroglycerin en route and 324 mg aspirin which did not improve his pain. He received 100 mcg of fentanyl which did drop his pain from a 7/10 to a 3. Kemal explains that his pain is worst in his back and denies any neck pain. Kemal also explains that his pain has gradually worsened. He endorses an episode of confusion at onset of his chest tightness and endorses some ongoing shortness of breath. Kemal notes a history vasospasms of his coronary arteries. He explains that he was working when his pain onset. 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 He notes that he has nitroglycerin at home but denies taking any this evening.  He denies any COVID-19 concerns or symptoms.    Review of Systems   Constitutional: Negative for chills and fever.   HENT: Negative for congestion, rhinorrhea and sore throat.    Respiratory: Positive for chest tightness and shortness of breath.    Cardiovascular: Positive for chest pain.   Gastrointestinal: Negative for abdominal pain, diarrhea, nausea and vomiting.   Neurological: Negative.    Psychiatric/Behavioral: Positive for confusion.   All other systems reviewed and are negative.      Allergies  Carbamazepine  Quinine  Penicillins  Sulfa Drugs    Medications  Lipitor  Levitra  Norvasc  Diltiazem  Hytrin    Past Medical History  SHELBY  Hypertension  Anxiety  BPH  Vertigo  Atherosclerotic heart disease of native Coronary artery with documented spasm  Hemangioma liver  G6PD deficiency    Past Surgical History  Hernia repairs, x3 on right side inguinal  Laparoscopic cholecstectomy    Family History  Colitis  Suicide  attempts  Depression  Congenital heart disease  Coronary artery disease  Hypertension  COPD  Lung cancer    Social History  Tobacco use: never smoker, chew user  Alcohol use: no  Drug use: no  Marital Status:   PCP: Juanita Sorenson    Physical Exam     Patient Vitals for the past 24 hrs:   BP Temp Temp src Pulse Resp SpO2   01/13/21 2320 123/81 -- -- 62 8 98 %   01/13/21 2310 -- -- -- 64 16 98 %   01/13/21 2300 130/79 -- -- 74 24 100 %   01/13/21 2250 126/77 -- -- 64 16 99 %   01/13/21 2230 123/77 -- -- 58 13 97 %   01/13/21 2220 112/64 -- -- 63 18 100 %   01/13/21 2150 115/70 -- -- 61 19 98 %   01/13/21 2145 115/70 -- -- 64 10 94 %   01/13/21 2130 127/77 -- -- 67 23 92 %   01/13/21 2115 122/71 -- -- 63 13 95 %   01/13/21 2100 128/77 -- -- 68 11 97 %   01/13/21 2054 (!) 146/77 98.4  F (36.9  C) Oral 64 18 95 %       Physical Exam    General: The patient is alert, in no respiratory distress. Pallor    HENT: Mucous membranes moist.    Cardiovascular: Regular rate and rhythm. Good pulses in all four extremities. Normal capillary refill and skin turgor.     Respiratory: Lungs are clear. No nasal flaring. No retractions. No wheezing, no crackles.    Gastrointestinal: Abdomen soft. No guarding, no rebound. No palpable hernias. No abdominal tenderness    Musculoskeletal: No gross deformity.     Skin: No rashes or petechiae.     Neurologic: The patient is alert and oriented x3. GCS 15. No testable cranial nerve deficit. Follows commands with clear and appropriate speech. Gives appropriate answers. Good strength in all extremities. No gross neurologic deficit. Gross sensation intact. Pupils are round and reactive. No meningismus.     Lymphatic: No cervical adenopathy. No lower extremity swelling.    Psychiatric: The patient is non-tearful.    Emergency Department Course   EKG  Indication: Chest Pain  Time: 2054  Rate 64 bpm. WA interval 192. QRS duration 94. QT/QTc 426/439.   Normal sinus rhythm  Normal ECG   Read  time: 2104  Read by Wilfrid Barber MD    EKG  Indication: Repeat  Time: 2135  Rate 63 bpm. CO interval 200. QRS duration 98. QT/QTc 436/446.   Normal sinus rhythm  Normal ECG   Read time: 2136  Read by: Wilfrid Barber MD    Imaging:  Radiology findings were communicated with the patient who voiced understanding of the findings.    CT Aortic Survey w Contrast  IMPRESSION:  1.  No aortic dissection or other acute aortic abnormality in the chest, abdomen, or pelvis.  2.  Diffuse esophageal wall thickening may represent esophagitis please correlate clinically.    Readings per Radiology    Laboratory:  Laboratory findings were communicated with the patient who voiced understanding of the findings.    CBC: WBC: 9.3, HGB: 11.3 (low), PLT: 334  BMP: Glucose 102 (high), Calcium: 8.2 (low), o/w WNL (Creatinine: 0.72)  Troponin (2100): <0.015  INR: 1.04    Emergency Department Course:  Reviewed:  2055 I reviewed the patient's nursing notes, vitals, past medical records, Care Everywhere.     Assessments:  2057 I physically examined the patient as documented above.  2235 I re-assessed the patient and updated them on the results of their workup thus far.    Interventions:  2140 NS 1L IV  2140 Nitrostat 0.4 mg Sublingual  2141 Zofran 4 mg IV  2147 Nitrostat 0.4 mg Sublingual  2155 morphine 4 mg IV  2156 Nitrostat 0.4 mg Sublingual  2257 GI Cocktail 30 mL PO    Disposition:  The patient was admitted under the care of Dr. Stearns     Impression & Plan   Covid-19  Kemal Rodriguez was evaluated during a global COVID-19 pandemic, which necessitated consideration that the patient might be at risk for infection with the SARS-CoV-2 virus that causes COVID-19.   Applicable protocols for evaluation were followed during the patient's care.   COVID-19 was considered as part of the patient's evaluation. The plan for testing is:  a test was obtained during this visit.    Medical Decision Making:  Kemal Rodriguez is a  50 year old male who presents with chest pressure rating through to the back associated with left jaw pain.  I was concerned because the patient had such significant symptoms complained of associated nausea and appeared pale.  His blood pressure was not significantly elevated I was concerned about causes such as dissection ACS PE pneumothorax amongst others.  He was sent for emergent CT scan which showed esophageal thickening.  In review of his chart he has had vasospasm in the past.  At this point his negative troponin does not exclude him from ACS and I felt that he did require admission to observation status for further cardiac work-up including stress test.  He does not show signs of a STEMI EKG was frequently repeated here and his first troponin is negative.  I did start him on Pepcid for esophagitis and was admitted in good condition.  The patient however still had symptoms such as pressure without overt ischemia.    Diagnosis:    ICD-10-CM    1. Other chest pain  R07.89    2.  Elevated blood pressure    Disposition:  Admitted to observation.    Scribe Disclosure:  I, Laurent Tavares, am serving as a scribe at 8:56 PM on 1/13/2021 to document services personally performed by Wilfrid Barber MD based on my observations and the provider's statements to me.      Wilfrid Barber MD  01/14/21 0017

## 2021-01-14 NOTE — PLAN OF CARE
ROOM # 223    Living Situation (if not independent, order SW consult): Home alone  [facility] name:  : [facility]     Activity level at baseline: Ind  Activity level on admit: Ind      Patient registered to observation; given Patient Bill of Rights; given the opportunity to ask questions about observation status and their plan of care.  Patient has been oriented to the observation room, bathroom and call light is in place.    Discussed discharge goals and expectations with patient/family.

## 2021-01-14 NOTE — PLAN OF CARE
PRIMARY DIAGNOSIS: CHEST PAIN  OUTPATIENT/OBSERVATION GOALS TO BE MET BEFORE DISCHARGE:  1. Ruled out acute coronary syndrome (negative or stable Troponin):  Trops negative x2  2. Pain Status: Pt continues to reports 6-7/10 chest pressure, radiating to back. MD notified.  3. Appropriate provocative testing performed: Pending  - Stress Test Procedure: Exercise Stress Test  - Interpretation of cardiac rhythm per telemetry tech: SB/SR 50s-60s    4. Cleared by Consultants (if applicable):N/A  5. Return to near baseline physical activity: Yes  Discharge Planner Nurse   Safe discharge environment identified: Yes  Barriers to discharge: Yes       Entered by: Samir Lyons 01/14/2021 4:27 AM    Vitals are Temp: 97.6  F (36.4  C) Temp src: Oral BP: 114/66 Pulse: 59   Resp: 18 SpO2: 92 %.  Patient is A/Ox4. Up independently in room. PIV SL. Denies dizziness, SOB, nausea. LS clear. CMS intact. Trending trop. Plan for exercise stress test this morning. COVID-19 negative. NPO. Will continue to monitor.        Please review provider order for any additional goals.   Nurse to notify provider when observation goals have been met and patient is ready for discharge.

## 2021-01-14 NOTE — PROGRESS NOTES
Notified of chest discomfort. Chart reviewed and pt examined.   States that he has pressure in chest 6/10 which was slightly relieved by morphine but has persisted through the night. NTG does not help him and makes him dizzy.    Temp: 97.6  F (36.4  C) Temp src: Oral BP: 114/66 Pulse: 59   Resp: 18 SpO2: 92 % O2 Device: None (Room air)       Gen - AAO x 3 in NAD.  Lungs - CTA B.  Heart - RR,S1+S2 nml, no m/g/r.  Abd - soft, NT, ND, + BS.  Ext - no edema.    EKG - NSR @ 61 bpm.    A/P - given his symptoms and risk factor profile, will treat this as unstable angina and start IV heparin. We did give him some IV morphine which has given him some relief. He does not want NTG.

## 2021-01-14 NOTE — DISCHARGE SUMMARY
[facility]    Observation Unit  Discharge Summary  Hospitalist    Date of Admission:  1/13/2021  Date of Discharge:  1/14/2021  Provider:  Rebekah Early PA-C  Date of Service (when I last saw the patient): 01/14/21    Discharge Diagnoses   Chest pain, suspect 2/2 esophagitis     Other medical issues:  Past Medical History:   Diagnosis Date     History of blood transfusion      Hypertension      Sleep apnea     uses cpap      History of Present Illness   Kemal Rodriguez is an 50 year old male with PMH significant for HTN, dyslipidemia, coronary vasospasm (documented on coronary cath in 2016), SHELBY, h/o chewing tobacco, obesity, and G6PD deficiency who was admitted on 1/15/21 for further evaluation of chest pain. Please see the admission history and physical for full details.    Hospital Course   Kemal Rodriguez was admitted on 1/13/2021.  The following problems were addressed during his hospitalization:    #Chest pain: presented with chest pain with radiation into back and jaw that started at 18:30 on 1/14. Minimal improvement with after receiving nitroglycerin and ASA. He had some improvement with IV morphine. Serial troponin levels negative. Telemetry showed sinus rhythm overnight. Due to persistent chest pain overnight the cross cover provider evaluated the patient and started him on IV Heparin gtt. Refused trial of Imdur due to inability to take Viagra with this, reports previously taking for 3.5 years without significant improvement in his coronary vasospasm. Primary cardiologist is Dr. Morse at [facility].    - Lexiscan on 1/14 showed diaphragmatic attenuation, less likely small area of nontransmural infarct and no convincing ischemia  - symptoms possibly related to esophagitis on CT scan, but unclear, patient was still feeling unwell the afternoon of 1/14 and kept overnight for additional symptoms control with mild improvement with receiving PO Oxycodone (did not try  Flexeril or Atarax available). He did not notice any change in his symptoms with Carafate with meals. He still had some chest discomfort the morning of 1/15 but felt comfortable discharging home with continued use of PO Omeprazole and outpatient follow up.     #Evidence of esophagitis on CT: incidental finding of diffuse esophageal wall thickening on CT which may represent esophagitis. No associated burning sensation with presenting chest pain per patient but may be source of ongoing pain. Will treat with Omeprazole 40 mg daily for 6-8 weeks with f/u with PCP for monitoring of improvement.     #HTN: stable, continue PTA Amlodipine and Diltiazem    #Dyslipidemia: lipid panel on 1/15 WNL, continue PTA statin therapy    #SHELBY: continue nightly CPAP use    #Tobacco abuse: discussed importance of cessation, currently chews <1 tin per day and trying to cut back     Pending Results   None    Code Status   Full Code       Primary Care Physician   Juanita Sorenson    Exam:    /71 (BP Location: Right arm)   Pulse 65   Temp 96.1  F (35.6  C) (Oral)   Resp 18   Ht 1.829 m (6')   Wt 113.5 kg (250 lb 4.8 oz)   SpO2 96%   BMI 33.95 kg/m    GEN:  Alert, oriented x 3, appears comfortable laying in bed, NAD.  HEENT:  Normocephalic/atraumatic, no scleral icterus, no nasal discharge, mouth moist.  CV:  Regular rate and rhythm, no murmur or JVD.  S1 + S2 noted, no S3 or S4.  CHEST: no reproducible tenderness with palpation.   LUNGS:  Clear to auscultation bilaterally without rales/rhonchi/wheezing/retractions.  Symmetric chest rise on inhalation noted.  ABD:  Active bowel sounds, soft, non-tender/non-distended.  No rebound/guarding/rigidity.  EXT:  No edema.  No cyanosis.  No acute joint synovitis noted.  SKIN:  Dry to touch, no exanthems noted in the visualized areas.    Discharge Disposition   Discharged to home    Consultations This Hospital Stay   PHARMACY IP CONSULT    Time Spent on this Encounter   Evelyn MEJIA  LANE Early, personally saw the patient today and spent greater than 30 minutes discharging this patient.    Discharge Orders      Reason for your hospital stay    You were admitted to the observation unit for chest pain. Your heart was monitored overnight with no abnormal findings. Your cardiac enzymes were negative for heart attack. You had a Lexiscan (chemical stress test) that was negative for heart disease so we do not believe your chest pain was related to your heart. Other possibilities include reflux, stress, and musculoskeletal strain. Based on your workup including a CT of your chest which showed a normal aorta there was an incidental finding of esophagitis which could be contributing to your presenting symptoms, due to this you were treated with IV Protonix while admitted and will continue on Omeprazole daily for 6-8 weeks to see if your symptoms improve or resolve with this. We recommend you follow up with your primary doctor if you continue to have pain.     Follow-up and recommended labs and tests     Follow up with primary care provider, Juanita Sorenson, within 7 days for hospital follow-up.  No follow up labs or test are needed.     Activity    Your activity upon discharge: activity as tolerated     Discharge Instructions    Continue Omeprazole 40 mg daily for 6-8 weeks and see if this improves your chest discomfort     Full Code     Diet    Follow this diet upon discharge: Orders Placed This Encounter      Combination Diet Regular Diet Adult; recommend limiting caffeine, citrus, and chocolate since this could worsen esophagitis     Discharge Medications   Current Discharge Medication List      START taking these medications    Details   omeprazole (PRILOSEC) 40 MG DR capsule Take 1 capsule (40 mg) by mouth daily  Qty: 60 capsule, Refills: 0    Associated Diagnoses: Gastroesophageal reflux disease with esophagitis without hemorrhage         CONTINUE these medications which have NOT CHANGED     Details   amLODIPine (NORVASC) 10 MG tablet Take 5 mg by mouth daily      aspirin 81 MG EC tablet Take 81 mg by mouth At Bedtime      atorvastatin (LIPITOR) 40 MG tablet Take 40 mg by mouth At Bedtime       diltiazem ER (DILT-XR) 180 MG 24 hr capsule Take 180 mg by mouth daily      Multiple Vitamin (MULTI-VITAMINS) TABS Take 1 tablet by mouth daily       nicotine (NICODERM CQ) 21 MG/24HR 24 hr patch Place 1 patch onto the skin every 24 hours      nicotine polacrilex (NICORETTE) 4 MG gum Place 4 mg inside cheek as needed for smoking cessation      omega 3 1000 MG CAPS Take 1 g by mouth 2 times daily       vitamin C (ASCORBIC ACID) 500 MG tablet Take 500 mg by mouth daily      vitamin D3 (CHOLECALCIFEROL) 50 mcg (2000 units) tablet Take 1 tablet by mouth daily      XIIDRA 5 % opthalmic solution Place 1 drop into both eyes 2 times daily           Allergies   Allergies   Allergen Reactions     Quinine GI Disturbance and Nausea and Vomiting     Nausea & Vomiting       Penicillins      Sulfa Drugs      Data   Results for orders placed or performed during the hospital encounter of 01/13/21   CT Aortic Survey w Contrast     Status: None    Narrative    EXAM: CTA AORTIC SURVEY W CONTRAST  LOCATION: [facility]  DATE/TIME: 1/13/2021 10:04 PM    INDICATION: ; chest pain radiating to jaw and back, eval dissection;  COMPARISON: CT abdomen pelvis 12/17/2019  TECHNIQUE: CT angiogram chest abdomen pelvis during arterial phase of injection of IV contrast. 2D and 3D MIP reconstructions were performed by the CT technologist. Dose reduction techniques were used.   CONTRAST: 80mL Isovue-370    FINDINGS:   CT ANGIOGRAM CHEST, ABDOMEN, AND PELVIS: No thoracic or abdominal aortic dissection. No significant atherosclerotic calcification or significant atheromatous plaque. Normal three-vessel aortic arch with widely patent normal caliber arch branch vessels.   No significant coronary arterial calcification or plaque proximally.  Celiac, superior mesenteric, bilateral renal, and inferior mesenteric arteries all appear normal. Bilateral common, internal, external, and common femoral arteries appear normal.    LUNGS AND PLEURA: No pulmonary mass, consolidation, or suspicious pulmonary nodule. No pleural effusion or pneumothorax.    MEDIASTINUM/AXILLAE: There is diffuse esophageal wall thickening. Small hiatal hernia suggested. No adenopathy or mass. Cardiac chambers unremarkable. No pericardial effusion.    HEPATOBILIARY: Prior cholecystectomy. No suspicious liver lesion.    PANCREAS: Normal.    SPLEEN: Normal.    ADRENAL GLANDS: Normal.    KIDNEYS/BLADDER: Normal.    BOWEL: Normal.    LYMPH NODES: Normal.    PELVIC ORGANS: Normal.    MUSCULOSKELETAL: Normal.      Impression    IMPRESSION:  1.  No aortic dissection or other acute aortic abnormality in the chest, abdomen, or pelvis.  2.  Diffuse esophageal wall thickening may represent esophagitis please correlate clinically.     CBC with platelets differential     Status: Abnormal   Result Value Ref Range    WBC 9.3 4.0 - 11.0 10e9/L    RBC Count 3.32 (L) 4.4 - 5.9 10e12/L    Hemoglobin 11.3 (L) 13.3 - 17.7 g/dL    Hematocrit 35.6 (L) 40.0 - 53.0 %     (H) 78 - 100 fl    MCH 34.0 (H) 26.5 - 33.0 pg    MCHC 31.7 31.5 - 36.5 g/dL    RDW 10.7 10.0 - 15.0 %    Platelet Count 334 150 - 450 10e9/L    Diff Method Automated Method     % Neutrophils 72.2 %    % Lymphocytes 20.3 %    % Monocytes 5.5 %    % Eosinophils 0.6 %    % Basophils 1.0 %    % Immature Granulocytes 0.4 %    Nucleated RBCs 0 0 /100    Absolute Neutrophil 6.7 1.6 - 8.3 10e9/L    Absolute Lymphocytes 1.9 0.8 - 5.3 10e9/L    Absolute Monocytes 0.5 0.0 - 1.3 10e9/L    Absolute Eosinophils 0.1 0.0 - 0.7 10e9/L    Absolute Basophils 0.1 0.0 - 0.2 10e9/L    Abs Immature Granulocytes 0.0 0 - 0.4 10e9/L    Absolute Nucleated RBC 0.0    Basic metabolic panel     Status: Abnormal   Result Value Ref Range    Sodium 137 133 - 144 mmol/L     Potassium 3.6 3.4 - 5.3 mmol/L    Chloride 105 94 - 109 mmol/L    Carbon Dioxide 29 20 - 32 mmol/L    Anion Gap 3 3 - 14 mmol/L    Glucose 102 (H) 70 - 99 mg/dL    Urea Nitrogen 12 7 - 30 mg/dL    Creatinine 0.72 0.66 - 1.25 mg/dL    GFR Estimate >90 >60 mL/min/[1.73_m2]    GFR Estimate If Black >90 >60 mL/min/[1.73_m2]    Calcium 8.2 (L) 8.5 - 10.1 mg/dL   Troponin I     Status: None   Result Value Ref Range    Troponin I ES <0.015 0.000 - 0.045 ug/L   INR     Status: None   Result Value Ref Range    INR 1.04 0.86 - 1.14   Asymptomatic SARS-CoV-2 COVID-19 Virus (Coronavirus) by PCR     Status: None    Specimen: Nasopharyngeal   Result Value Ref Range    SARS-CoV-2 Virus Specimen Source Nasopharyngeal     SARS-CoV-2 PCR Result NEGATIVE     SARS-CoV-2 PCR Comment (Note)    Lipid panel reflex to direct LDL     Status: Abnormal   Result Value Ref Range    Cholesterol 134 <200 mg/dL    Triglycerides 107 <150 mg/dL    HDL Cholesterol 34 (L) >39 mg/dL    LDL Cholesterol Calculated 79 <100 mg/dL    Non HDL Cholesterol 100 <130 mg/dL   TSH     Status: None   Result Value Ref Range    TSH 3.07 0.40 - 4.00 mU/L   Troponin I     Status: None   Result Value Ref Range    Troponin I ES <0.015 0.000 - 0.045 ug/L   Troponin I     Status: None   Result Value Ref Range    Troponin I ES <0.015 0.000 - 0.045 ug/L   Partial thromboplastin time     Status: None   Result Value Ref Range    PTT 28 22 - 37 sec   CBC with platelets     Status: Abnormal   Result Value Ref Range    WBC 6.8 4.0 - 11.0 10e9/L    RBC Count 3.23 (L) 4.4 - 5.9 10e12/L    Hemoglobin 11.1 (L) 13.3 - 17.7 g/dL    Hematocrit 35.0 (L) 40.0 - 53.0 %     (H) 78 - 100 fl    MCH 34.4 (H) 26.5 - 33.0 pg    MCHC 31.7 31.5 - 36.5 g/dL    RDW 10.8 10.0 - 15.0 %    Platelet Count 312 150 - 450 10e9/L   Heparin Unfractionated Anti Xa Level     Status: None   Result Value Ref Range    Heparin Unfractionated Anti Xa Level <0.10 IU/mL   EKG 12 lead     Status: None    Result Value Ref Range    Interpretation ECG Click View Image link to view waveform and result    EKG 12 lead     Status: None   Result Value Ref Range    Interpretation ECG Click View Image link to view waveform and result    EKG 12-lead, tracing only     Status: None   Result Value Ref Range    Interpretation ECG Click View Image link to view waveform and result    NM MPI w Lexiscan     Status: None   Result Value Ref Range    Target      Baseline Systolic      Baseline Diastolic BP 83     Last Stress Systolic      Last Stress Diastolic BP 73     Baseline HR 59     Max HR 83     Calculated Percent HR 49 %    Rate Pressure Product 11,122.0     Stress/rest perfusion ratio 1.06     Left Ventricular EF 70 %    Narrative       The nuclear stress test is probably negative for inducible myocardial   ischemia or infarction.     Nuclear Study Quality: The  images demonstrate   diaphragmatic attenuation.  Less likely would be small area of   nontransmural infarct.  No convincing ischemia.     The left ventricular ejection fraction at stress is 70%.     There is no prior study for comparison.       Evelyn Early PA-C

## 2021-01-14 NOTE — PHARMACY-ADMISSION MEDICATION HISTORY
Admission medication history interview status for this patient is complete. See [facility] admission navigator for allergy information, prior to admission medications and immunization status.     Medication history interview done via telephone during Covid-19 pandemic, indicate source(s): Patient  Medication history resources (including written lists, pill bottles, clinic record): [employer] dispense records; Care Everywhere [[facility]]  Pharmacy: [facility]  [place], [place] - [address] [place] [address]...    Changes made to PTA medication list:  Added: Xiidra eye drops; Vitamin D;  Deleted: Atenolol; Hydrocodone; Sertraline; Terazosin;   Changed:   1. Amlodipine 10mg --> 5mg daily  2. Diltiazem ER 240mg --> 180mg daily  3. Fish oil 1g daily --> 1g BID      Actions taken by pharmacist (provider contacted, etc): Sticky note to MD    Additional medication history information:None    Medication reconciliation/reorder completed by provider prior to medication history?  Yes         Prior to Admission medications    Medication Sig Last Dose Taking? Auth Provider   amLODIPine (NORVASC) 10 MG tablet Take 5 mg by mouth daily 1/13/2021 at AM Yes Unknown, Entered By History   aspirin 81 MG EC tablet Take 81 mg by mouth At Bedtime 1/12/2021 at PM Yes Unknown, Entered By History   atorvastatin (LIPITOR) 40 MG tablet Take 40 mg by mouth At Bedtime  1/12/2021 at PM Yes Reported, Patient   diltiazem ER (DILT-XR) 180 MG 24 hr capsule Take 180 mg by mouth daily 1/13/2021 at AM Yes Unknown, Entered By History   Multiple Vitamin (MULTI-VITAMINS) TABS Take 1 tablet by mouth daily  1/13/2021 at AM Yes Reported, Patient   nicotine (NICODERM CQ) 21 MG/24HR 24 hr patch Place 1 patch onto the skin every 24 hours Past Week at Unknown time Yes Unknown, Entered By History   nicotine polacrilex (NICORETTE) 4 MG gum Place 4 mg inside cheek as needed for smoking cessation 1/13/2021 at Unknown time Yes  Unknown, Entered By History   omega 3 1000 MG CAPS Take 1 g by mouth 2 times daily  1/13/2021 at AM Yes Reported, Patient   vitamin C (ASCORBIC ACID) 500 MG tablet Take 500 mg by mouth daily 1/13/2021 at AM Yes Unknown, Entered By History   vitamin D3 (CHOLECALCIFEROL) 50 mcg (2000 units) tablet Take 1 tablet by mouth daily 1/13/2021 at AM Yes Unknown, Entered By History   XIIDRA 5 % opthalmic solution Place 1 drop into both eyes 2 times daily 1/13/2021 at Unknown time Yes Unknown, Entered By History

## 2021-01-14 NOTE — H&P
[facility]    History and Physical  Hospitalist       Date of Admission:  1/13/2021    Assessment & Plan   Kemal Rodriguez is a 50 year old male who presents with chest pain.    This is a 50-year-old gentleman with a past medical history significant for hypertension, dyslipidemia, coronary vasospasm (documented on coronary cath in 2016), obstructive sleep apnea, history of chewing tobacco, obesity, G6PD deficiency..    He presented to the emergency room with chief complaints of chest pressure which started at 6:30 PM.  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  The pain was radiating to his back and towards his jaw.  Patient was given nitroglycerin as well as 324 mg of aspirin which did not help with the pain.    He has a history of coronary artery vasospasm but he says that this pain is different than the spasm pain.  That pain usually happens with activity.    He did experience some difficulty breathing.  Denies any palpitations/dizziness/sweating/nausea.    Denies any fever or chills.  Denies any new cough.    Patient underwent serial EKGs in the emergency room which were unremarkable.  First troponin is negative.  Underwent CT aortic survey because of the nature of the pain and was negative.  It did show evidence of esophagitis.    Problem List:    Chest pain.  -Patient does have coronary risk factors: Hypertension, obesity, obstructive sleep apnea, family history, tobacco use.  -Admission to medicine service as obs  -Serial troponins.  -Exercise nuclear medicine stress test tomorrow.  -Lipid panel.  TSH.  Hemoglobin A1c.  -Aspirin, statin.    Evidence of esophagitis on CT  -Patient denies any heartburn symptoms at baseline.  -Start PPI.    Hypertension  -Continue amlodipine, diltiazem.    Dyslipidemia  -Continue statin.  Check lipid panel.    Obstructive sleep apnea  -CPAP    History of G6PD deficiency.    DVT Prophylaxis: Pneumatic Compression Devices  Code Status: Full  Code    Mandeep Stearns MD    Primary Care Physician   Juanita Sorenson    Chief Complaint   Chest pain      History of Present Illness   Kemal Rodriguez is a 50 year old male presented with chest pain      Past Medical History    I have reviewed this patient's medical history and updated it with pertinent information if needed.   Past Medical History:   Diagnosis Date     History of blood transfusion      Hypertension      Sleep apnea     uses cpap    Coronary vasospasm  G6PD deficiency  Dyslipidemia    Past Surgical History   I have reviewed this patient's surgical history and updated it with pertinent information if needed.  Past Surgical History:   Procedure Laterality Date     HERNIA REPAIR      x3 on R inguinal  side     LAPAROSCOPIC CHOLECYSTECTOMY N/A 12/18/2019    Procedure: CHOLECYSTECTOMY, LAPAROSCOPIC;  Surgeon: Therese Crow MD;  Location:  OR       Prior to Admission Medications   Prior to Admission Medications   Prescriptions Last Dose Informant Patient Reported? Taking?   Calcium Carb-Ergocalciferol 500-200 MG-UNIT TABS   Yes No   Sig: Take 1 tablet by mouth daily    HYDROcodone-acetaminophen (NORCO) 5-325 MG tablet   No No   Sig: Take 1-2 tablets by mouth every 6 hours as needed for breakthrough pain or severe pain   Multiple Vitamin (MULTI-VITAMINS) TABS   Yes No   Sig: Take 1 tablet by mouth daily    amLODIPine (NORVASC) 10 MG tablet   Yes No   Sig: TAKE ONE TABLET BY MOUTH EVERY DAY   aspirin 81 MG EC tablet   Yes No   Sig: Take 81 mg by mouth At Bedtime   atenolol (TENORMIN) 50 MG tablet   Yes No   Sig: Take 50 mg by mouth daily   atorvastatin (LIPITOR) 40 MG tablet   Yes No   Sig: TAKE 1 TABLET AT BEDTIME   cholecalciferol (VITAMIN D3) 125 MCG (5000 UT) TABS tablet   Yes No   Sig: Take 5,000 Units by mouth daily    diltiazem ER COATED BEADS (CARDIZEM CD/CARTIA XT) 240 MG 24 hr capsule   Yes No   Sig: Take 240 mg by mouth daily    isosorbide mononitrate (IMDUR) 30 MG 24 hr tablet    Yes No   Sig: Take 90 mg by mouth daily    nicotine (NICODERM CQ) 21 MG/24HR 24 hr patch   Yes No   Sig: Place 1 patch onto the skin every 24 hours   nicotine polacrilex (NICORETTE) 4 MG gum   Yes No   Sig: Place 4 mg inside cheek as needed for smoking cessation   omega 3 1000 MG CAPS   Yes No   Sig: Take 1 g by mouth daily    sertraline (ZOLOFT) 100 MG tablet   Yes No   Sig: Take 200 mg by mouth At Bedtime    terazosin (HYTRIN) 1 MG capsule   Yes No   Sig: TAKE 1 CAPSULE AT BEDTIME      Facility-Administered Medications: None     Allergies   Allergies   Allergen Reactions     Quinine GI Disturbance and Nausea and Vomiting     Nausea & Vomiting       Penicillins      Sulfa Drugs        Social History   I have reviewed this patient's social history and updated it with pertinent information if needed. Kemal Rodriguez  has an unknown smoking status. His smokeless tobacco use includes chew. He reports that he does not drink alcohol or use drugs.    Family History   Per patient, multiple family members with coronary artery disease on father's side.  Mother has G6PD deficiency.    Review of Systems   The 10 point Review of Systems is negative other than noted in the HPI or here.     Physical Exam   Temp: 98.4  F (36.9  C) Temp src: Oral BP: 123/81 Pulse: 62   Resp: 8 SpO2: 98 % O2 Device: None (Room air)    Vital Signs with Ranges  Temp:  [98.4  F (36.9  C)] 98.4  F (36.9  C)  Pulse:  [58-74] 62  Resp:  [8-24] 8  BP: (112-146)/(64-81) 123/81  SpO2:  [92 %-100 %] 98 %  0 lbs 0 oz    Constitutional: Awake, alert, cooperative, no apparent distress.  Eyes: Conjunctiva and pupils examined and normal.  HEENT: Moist mucous membranes, normal dentition.  Respiratory: Clear to auscultation bilaterally, no crackles or wheezing.  Cardiovascular: Regular rate and rhythm, normal S1 and S2, and no murmur noted.  GI: Soft, non-distended, non-tender, normal bowel sounds.  Skin: No rashes, no cyanosis, no edema.  Musculoskeletal: No  joint swelling, erythema or tenderness.  Neurologic: Cranial nerves 2-12 intact, normal strength and sensation.  Psychiatric: Alert, oriented to person, place and time, no obvious anxiety or depression.    Data     Recent Labs   Lab 01/13/21  2100   WBC 9.3   HGB 11.3*   *      INR 1.04      POTASSIUM 3.6   CHLORIDE 105   CO2 29   BUN 12   CR 0.72   ANIONGAP 3   DALE 8.2*   *   TROPI <0.015       Recent Results (from the past 24 hour(s))   CT Aortic Survey w Contrast    Narrative    EXAM: CTA AORTIC SURVEY W CONTRAST  LOCATION: [facility]  DATE/TIME: 1/13/2021 10:04 PM    INDICATION: ; chest pain radiating to jaw and back, eval dissection;  COMPARISON: CT abdomen pelvis 12/17/2019  TECHNIQUE: CT angiogram chest abdomen pelvis during arterial phase of injection of IV contrast. 2D and 3D MIP reconstructions were performed by the CT technologist. Dose reduction techniques were used.   CONTRAST: 80mL Isovue-370    FINDINGS:   CT ANGIOGRAM CHEST, ABDOMEN, AND PELVIS: No thoracic or abdominal aortic dissection. No significant atherosclerotic calcification or significant atheromatous plaque. Normal three-vessel aortic arch with widely patent normal caliber arch branch vessels.   No significant coronary arterial calcification or plaque proximally. Celiac, superior mesenteric, bilateral renal, and inferior mesenteric arteries all appear normal. Bilateral common, internal, external, and common femoral arteries appear normal.    LUNGS AND PLEURA: No pulmonary mass, consolidation, or suspicious pulmonary nodule. No pleural effusion or pneumothorax.    MEDIASTINUM/AXILLAE: There is diffuse esophageal wall thickening. Small hiatal hernia suggested. No adenopathy or mass. Cardiac chambers unremarkable. No pericardial effusion.    HEPATOBILIARY: Prior cholecystectomy. No suspicious liver lesion.    PANCREAS: Normal.    SPLEEN: Normal.    ADRENAL GLANDS: Normal.    KIDNEYS/BLADDER:  Normal.    BOWEL: Normal.    LYMPH NODES: Normal.    PELVIC ORGANS: Normal.    MUSCULOSKELETAL: Normal.      Impression    IMPRESSION:  1.  No aortic dissection or other acute aortic abnormality in the chest, abdomen, or pelvis.  2.  Diffuse esophageal wall thickening may represent esophagitis please correlate clinically.

## 2021-01-14 NOTE — ED TRIAGE NOTES
Pt A&Ox4. ABCs intact. Start having chest pressure around 1930 that radiates to his back and left jaw. Hx HTN and family Hx of heart attack. EMS administered 324 mg aspirin and 3 nitroglycerin, which did not help much. 100mcg of fentanyl dropped pain from 7/10 to 3/10.

## 2021-01-14 NOTE — PLAN OF CARE
PRIMARY DIAGNOSIS: CHEST PAIN  OUTPATIENT/OBSERVATION GOALS TO BE MET BEFORE DISCHARGE:  1. Ruled out acute coronary syndrome (negative or stable Troponin):  Trops negative x2  2. Pain Status: Pt continues to reports 6-7/10 chest pressure, radiating to back. MD notified.  3. Appropriate provocative testing performed: Pending  - Stress Test Procedure: Exercise Stress Test  - Interpretation of cardiac rhythm per telemetry tech: SB/SR 50s-60s    4. Cleared by Consultants (if applicable):N/A  5. Return to near baseline physical activity: Yes  Discharge Planner Nurse   Safe discharge environment identified: Yes  Barriers to discharge: Yes       Entered by: Ruthann Irene 01/14/2021     Vitals are Temp: 98.1  F (36.7  C) Temp src: Oral BP: 129/78 Pulse: 61   Resp: 18 SpO2: 96 %.    Patient is A/Ox4. Up independently in room. PIV SL. Denies dizziness, SOB, nausea. Chest pain has been consistent 6-7, provider started patient on heparin drip overnight. LS clear. CMS intact. Trending trops-negative Plan for exercise stress test this morning. COVID-19 negative. NPO. Will continue to monitor.     Please review provider order for any additional goals. Nurse to notify provider when observation goals have been met and patient is ready for discharge.

## 2021-01-15 VITALS
HEART RATE: 68 BPM | DIASTOLIC BLOOD PRESSURE: 78 MMHG | HEIGHT: 72 IN | OXYGEN SATURATION: 96 % | RESPIRATION RATE: 16 BRPM | TEMPERATURE: 96.4 F | SYSTOLIC BLOOD PRESSURE: 124 MMHG | BODY MASS INDEX: 33.9 KG/M2 | WEIGHT: 250.3 LBS

## 2021-01-15 PROCEDURE — C9113 INJ PANTOPRAZOLE SODIUM, VIA: HCPCS | Performed by: INTERNAL MEDICINE

## 2021-01-15 PROCEDURE — 96376 TX/PRO/DX INJ SAME DRUG ADON: CPT

## 2021-01-15 PROCEDURE — 250N000011 HC RX IP 250 OP 636: Performed by: INTERNAL MEDICINE

## 2021-01-15 PROCEDURE — 250N000013 HC RX MED GY IP 250 OP 250 PS 637: Performed by: PHYSICIAN ASSISTANT

## 2021-01-15 PROCEDURE — G0378 HOSPITAL OBSERVATION PER HR: HCPCS

## 2021-01-15 RX ORDER — OMEPRAZOLE 40 MG/1
40 CAPSULE, DELAYED RELEASE ORAL DAILY
Qty: 60 CAPSULE | Refills: 0 | Status: SHIPPED | OUTPATIENT
Start: 2021-01-15 | End: 2021-03-16

## 2021-01-15 RX ADMIN — OXYCODONE HYDROCHLORIDE 5 MG: 5 TABLET ORAL at 03:37

## 2021-01-15 RX ADMIN — PANTOPRAZOLE SODIUM 40 MG: 40 INJECTION, POWDER, FOR SOLUTION INTRAVENOUS at 08:12

## 2021-01-15 RX ADMIN — SUCRALFATE 1 G: 1 SUSPENSION ORAL at 08:11

## 2021-01-15 RX ADMIN — DILTIAZEM HYDROCHLORIDE 180 MG: 180 CAPSULE, COATED, EXTENDED RELEASE ORAL at 08:12

## 2021-01-15 RX ADMIN — AMLODIPINE BESYLATE 5 MG: 5 TABLET ORAL at 08:12

## 2021-01-15 NOTE — PLAN OF CARE
PRIMARY DIAGNOSIS: CHEST PAIN  OUTPATIENT/OBSERVATION GOALS TO BE MET BEFORE DISCHARGE:  1. Ruled out acute coronary syndrome (negative or stable Troponin):  Yes  2. Pain Status: Improved-controlled with oral pain medications.  3. Appropriate provocative testing performed: Yes  - Stress Test Procedure: Lesiscan  - Interpretation of cardiac rhythm per telemetry tech: SR 70s    4. Cleared by Consultants (if applicable):N/A  5. Return to near baseline physical activity: Yes  Discharge Planner Nurse   Safe discharge environment identified: Yes  Barriers to discharge: No       Entered by: Pushpa Wick 01/15/2021 12:27 AM     Please review provider order for any additional goals.   Nurse to notify provider when observation goals have been met and patient is ready for discharge.

## 2021-01-15 NOTE — PLAN OF CARE
PRIMARY DIAGNOSIS: CHEST PAIN  OUTPATIENT/OBSERVATION GOALS TO BE MET BEFORE DISCHARGE:  1. Ruled out acute coronary syndrome (negative or stable Troponin):  Trops negative x2  2. Pain Status: Pt continues to reports 6-7/10 chest pressure, radiating to back. MD notified.  3. Appropriate provocative testing performed: Pending  - Stress Test Procedure: Exercise Stress Test  - Interpretation of cardiac rhythm per telemetry tech: SB/SR 50s-60s    4. Cleared by Consultants (if applicable):N/A  5. Return to near baseline physical activity: Yes  Discharge Planner Nurse   Safe discharge environment identified: Yes  Barriers to discharge: Yes       Entered by: Ruthann Irene 01/14/2021     Vitals are Temp: 97.1  F (36.2  C) Temp src: Oral BP: 127/67 Pulse: 64   Resp: 18 SpO2: 96 %.    Patient is A/Ox4. Up independently in room. PIV SL. Denies dizziness, SOB, nausea. Cardiac testing came back negative. Patient declined discharge. LS clear. CMS intact. Trending trops-negative, COVID-19 negative. NPO. Will continue to monitor overnight.     Please review provider order for any additional goals. Nurse to notify provider when observation goals have been met and patient is ready for discharge.

## 2021-01-15 NOTE — PROGRESS NOTES
Order for home Cpap. Pt states he has cpap at home that he uses did not bring in with admission . Offered hospital unit for pt to use during this stay , pt refused.   Will continue to monitor.

## 2021-01-15 NOTE — PLAN OF CARE
PRIMARY DIAGNOSIS: CHEST PAIN  OUTPATIENT/OBSERVATION GOALS TO BE MET BEFORE DISCHARGE:  1. Ruled out acute coronary syndrome (negative or stable Troponin):  Yes  2. Pain Status: Continues to have 5/10 chest pressure - states it feels like someone is sitting on chest. Radiates to back. Declined offer of Tylenol, ice, & heat.   3. Appropriate provocative testing performed: Yes  - Stress Test Procedure: Lexiscan  - Interpretation of cardiac rhythm per telemetry tech: SB HR 56    4. Cleared by Consultants (if applicable):N/A  5. Return to near baseline physical activity: Yes  Discharge Planner Nurse   Safe discharge environment identified: TBD  Barriers to discharge: No       Entered by: Ellyn Haley 01/15/2021 8:28 AM     Please review provider order for any additional goals.   Nurse to notify provider when observation goals have been met and patient is ready for discharge.

## 2021-01-15 NOTE — PLAN OF CARE
PRIMARY DIAGNOSIS: CHEST PAIN  OUTPATIENT/OBSERVATION GOALS TO BE MET BEFORE DISCHARGE:  1. Ruled out acute coronary syndrome (negative or stable Troponin):  Yes  2. Pain Status: Improved-controlled with oral pain medications.  3. Appropriate provocative testing performed: Yes  - Stress Test Procedure: Lesiscan  - Interpretation of cardiac rhythm per telemetry tech: SR 60s    4. Cleared by Consultants (if applicable):N/A  5. Return to near baseline physical activity: Yes  Discharge Planner Nurse   Safe discharge environment identified: Yes  Barriers to discharge: No       Entered by: Pushpa Wick 01/15/2021 4:36 AM    VSS, pt is A&Ox4, up independently, Hina yesterday, SL, complaining of chest pain that radiates to back-oxycodone for pain, will continue to monitor       Please review provider order for any additional goals.   Nurse to notify provider when observation goals have been met and patient is ready for discharge.

## 2021-01-15 NOTE — PLAN OF CARE
PRIMARY DIAGNOSIS: CHEST PAIN  OUTPATIENT/OBSERVATION GOALS TO BE MET BEFORE DISCHARGE:  1. Ruled out acute coronary syndrome (negative or stable Troponin):  Trops negative x2  2. Pain Status: Pt continues to reports 6-7/10 chest pressure, radiating to back. MD notified.  3. Appropriate provocative testing performed: Pending  - Stress Test Procedure: Exercise Stress Test  - Interpretation of cardiac rhythm per telemetry tech: SB/SR 50s-60s    4. Cleared by Consultants (if applicable):N/A  5. Return to near baseline physical activity: Yes  Discharge Planner Nurse   Safe discharge environment identified: Yes  Barriers to discharge: Yes       Entered by: Ruthann Irene 01/14/2021     Vitals are Temp: 97.1  F (36.2  C) Temp src: Oral BP: 127/67 Pulse: 64   Resp: 18 SpO2: 96 %.    Patient is A/Ox4. Up independently in room. PIV SL. Denies dizziness, SOB, nausea. Chest pain has been consistent 6-7, provider started patient on heparin drip overnight. LS clear. CMS intact. Trending trops-negative Plan for exercise stress test this morning. COVID-19 negative. NPO. Will continue to monitor.     Please review provider order for any additional goals. Nurse to notify provider when observation goals have been met and patient is ready for discharge.

## 2022-01-13 NOTE — PLAN OF CARE
Patient's After Visit Summary was reviewed with patient.  Patient verbalized understanding of After Visit Summary, recommended follow up and was given an opportunity to ask questions.   Discharge medications sent home with patient/family: No, filled at pharmacy of patient's choice.   Discharged with self.    Discharged in stable condition; chest pain unchanged and provider aware. Taxi arranged with assistance of SW. Patient has appointment with PCP this afternoon.     OBSERVATION patient END time: 11:17 AM       Price (Do Not Change): 0.00 Instructions: This plan will send the code FBSE to the PM system.  DO NOT or CHANGE the price. Detail Level: Generalized

## 2023-05-19 ENCOUNTER — TRANSFERRED RECORDS (OUTPATIENT)
Dept: HEALTH INFORMATION MANAGEMENT | Facility: CLINIC | Age: 53
End: 2023-05-19

## 2023-06-19 ENCOUNTER — TRANSFERRED RECORDS (OUTPATIENT)
Dept: HEALTH INFORMATION MANAGEMENT | Facility: CLINIC | Age: 53
End: 2023-06-19
Payer: COMMERCIAL

## 2023-06-19 ENCOUNTER — MEDICAL CORRESPONDENCE (OUTPATIENT)
Dept: HEALTH INFORMATION MANAGEMENT | Facility: CLINIC | Age: 53
End: 2023-06-19
Payer: COMMERCIAL

## 2023-06-19 LAB
ALT SERPL-CCNC: 29 U/L
AST SERPL-CCNC: 27 U/L (ref 17–59)
CHOLESTEROL (EXTERNAL): 140 MG/DL (ref 0–200)
CREATININE (EXTERNAL): 0.9 MG/DL (ref 0.7–1.4)
GFR ESTIMATED (EXTERNAL): >60 ML/MIN/1.73M2
GLUCOSE (EXTERNAL): 80 MG/DL (ref 60–99)
HDLC SERPL-MCNC: 33 MG/DL (ref 35–65)
LDL CHOLESTEROL CALCULATED (EXTERNAL): 66 MG/DL (ref 0–130)
POTASSIUM (EXTERNAL): 4.3 MMOL/L (ref 3.5–5.1)
TRIGLYCERIDES (EXTERNAL): 209 MG/DL (ref 0–200)

## 2023-07-10 ENCOUNTER — MEDICAL CORRESPONDENCE (OUTPATIENT)
Dept: HEALTH INFORMATION MANAGEMENT | Facility: CLINIC | Age: 53
End: 2023-07-10

## 2023-07-10 ENCOUNTER — TRANSFERRED RECORDS (OUTPATIENT)
Dept: HEALTH INFORMATION MANAGEMENT | Facility: CLINIC | Age: 53
End: 2023-07-10

## 2023-07-10 ENCOUNTER — OFFICE VISIT (OUTPATIENT)
Dept: CARDIOLOGY | Facility: CLINIC | Age: 53
End: 2023-07-10
Payer: COMMERCIAL

## 2023-07-10 ENCOUNTER — TELEPHONE (OUTPATIENT)
Dept: CARDIOLOGY | Facility: CLINIC | Age: 53
End: 2023-07-10

## 2023-07-10 VITALS
DIASTOLIC BLOOD PRESSURE: 66 MMHG | RESPIRATION RATE: 14 BRPM | BODY MASS INDEX: 35.49 KG/M2 | WEIGHT: 262 LBS | HEIGHT: 72 IN | HEART RATE: 79 BPM | SYSTOLIC BLOOD PRESSURE: 132 MMHG

## 2023-07-10 DIAGNOSIS — R07.89 OTHER CHEST PAIN: Primary | ICD-10-CM

## 2023-07-10 DIAGNOSIS — I25.83 CORONARY ARTERY DISEASE DUE TO LIPID RICH PLAQUE: ICD-10-CM

## 2023-07-10 DIAGNOSIS — E66.01 CLASS 2 SEVERE OBESITY DUE TO EXCESS CALORIES WITH SERIOUS COMORBIDITY AND BODY MASS INDEX (BMI) OF 38.0 TO 38.9 IN ADULT (H): ICD-10-CM

## 2023-07-10 DIAGNOSIS — I25.83 CORONARY ARTERY DISEASE DUE TO LIPID RICH PLAQUE: Primary | ICD-10-CM

## 2023-07-10 DIAGNOSIS — I25.10 CORONARY ARTERY DISEASE DUE TO LIPID RICH PLAQUE: ICD-10-CM

## 2023-07-10 DIAGNOSIS — E66.812 CLASS 2 SEVERE OBESITY DUE TO EXCESS CALORIES WITH SERIOUS COMORBIDITY AND BODY MASS INDEX (BMI) OF 38.0 TO 38.9 IN ADULT (H): ICD-10-CM

## 2023-07-10 DIAGNOSIS — I25.10 CORONARY ARTERY DISEASE DUE TO LIPID RICH PLAQUE: Primary | ICD-10-CM

## 2023-07-10 LAB
ABO/RH(D): NORMAL
ANTIBODY SCREEN: NEGATIVE
SPECIMEN EXPIRATION DATE: NORMAL

## 2023-07-10 PROCEDURE — 99204 OFFICE O/P NEW MOD 45 MIN: CPT | Performed by: INTERNAL MEDICINE

## 2023-07-10 RX ORDER — DILTIAZEM HYDROCHLORIDE 180 MG/1
180 CAPSULE, EXTENDED RELEASE ORAL DAILY
COMMUNITY
End: 2024-09-16 | Stop reason: DRUGHIGH

## 2023-07-10 RX ORDER — BUPROPION HYDROCHLORIDE 300 MG/1
300 TABLET ORAL EVERY MORNING
COMMUNITY

## 2023-07-10 RX ORDER — NITROGLYCERIN 0.4 MG/1
TABLET SUBLINGUAL
Qty: 25 TABLET | Refills: 4 | Status: SHIPPED | OUTPATIENT
Start: 2023-07-10

## 2023-07-10 RX ORDER — TAMSULOSIN HYDROCHLORIDE 0.4 MG/1
0.4 CAPSULE ORAL DAILY
COMMUNITY

## 2023-07-10 RX ORDER — CYCLOBENZAPRINE HCL 10 MG
10 TABLET ORAL 3 TIMES DAILY PRN
COMMUNITY

## 2023-07-10 RX ORDER — ISOSORBIDE MONONITRATE 30 MG/1
120 TABLET, EXTENDED RELEASE ORAL DAILY
Qty: 300 TABLET | Refills: 4 | Status: SHIPPED | OUTPATIENT
Start: 2023-07-10 | End: 2023-12-12

## 2023-07-10 RX ORDER — ISOSORBIDE MONONITRATE 30 MG/1
90 TABLET, EXTENDED RELEASE ORAL DAILY
COMMUNITY
End: 2023-07-10

## 2023-07-10 NOTE — LETTER
7/10/2023    Juanita Sorenson, CNP  [address] [facility] [address]    RE: Kemal Rodriguez       Dear Colleague,     I had the pleasure of seeing Kemal Rodriguez in the [facility].    HEART CARE ENCOUNTER CONSULTATON NOTE      [facility]  [phone]      Assessment/Recommendations   Assessment/Plan:   1.  Chest discomfort.  Patient with a history of coronary vasospasm by outside chart record reviewed.  He also has risk factors for coronary artery disease including a reported significant family history of premature heart disease.  He underwent a coronary angiogram in 2016 that was reported positive  acetylcholine challenge.  He has been on a combination of amlodipine, diltiazem and isosorbide mononitrate.  He endorses some increased symptoms of chest discomfort over the past 6+ months.  He now walks shorter distances and develops a burning sensation in his chest and it takes longer for it to resolve.  He does not have symptoms at rest.  However previously he had to be more rigorous in his exercise pursuits and now it occurs with less rigorous activity.  We discussed this at length today.  He does have risk factors for obstructive coronary artery disease in the last coronary angiogram was 2016.  We discussed coronary angiography and possible coronary intervention and additional flow studies versus CT angiogram.  I compared and contrasted both.  I discussed the risks of coronary angiography  Discussed with the patient the risks and benefits of left heat catheterization with coronary angiogram including possible PCI. The risks include but are not limited to death, myocardial infarction, stroke, kidney dysfunction, vessel trauma, hemorrhage, need for emergency corrective surgery, allergy, and dysrhythmia.  After extensive discussion he wishes to proceed to coronary angiography and possible coronary intervention.  I think given his history and ongoing  symptoms of chest discomfort this is reasonable and we will arrange in the near future.  I recommended that we increase the isosorbide mononitrate to 120 mg daily and gave him a new prescription.  I recommended the use of sublingual nitroglycerin and gave him prescription.  I asked him to seek more immediate attention if symptoms are worsening or progressive.      Plan 1.  Coronary angiogram with possible coronary intervention and possible flow studies.  2.  Increase isosorbide mononitrate to 120 mg daily.  3.  As needed sublingual nitroglycerin.  4.  I elected to continue with the current combination of medications in the short-term.  There had been discussion with respect over normalizing and L-arginine in prior chart notes.  5.  It is recommended that he seek more immediate attention if symptoms are worsening or progressive.  6.  Follow-up in a few months pending the above.  7.  Ongoing risk modification.  Prudent diet.  Complete nicotine cessation discussed.  Most recent lipids are reviewed from below.  He has a low HDL.    EGD today demonstrates sinus rhythm, right bundle branch block, here is an EKG from January 2021 where there was possible inferior infarct but no right bundle branch block at that time.             History of Present Illness/Subjective    HPI: Kemal Rodriguez is a 53 year old male seen as a new patient.  Chart notes are reviewed.  He has follow-up with outside cardiology with a history of chest discomfort.  Patient had a coronary angiogram in 2016 that demonstrated symptomatic coronary artery spasm with acetylcholine injection.  There is no significant obstructive disease reported at that time.  The most recent note is from August 2022.  He has received medical therapy for chest discomfort.  He has been utilizing chewing tobacco at that time.  Chart records indicate that he has been on a combination of amlodipine and diltiazem.  He was started on Imdur August 2022 as he was no longer  taking Cialis.    He reports to me today that the chest discomfort symptoms have progressed.  He walks shorter distances and develops a burning sensation in his chest that slowly resolves with rest.  He recalls a relatively recent episode however then it took approximately 45 minutes of rest for it to resolve.  He also endorses some intermittent shortness of breath.  The chest discomfort does not occur at rest but the shortness of breath symptoms can intermittently occur at rest.  He has no history of orthopnea or PND.  He has had no symptoms of syncope or near syncope and tolerates the current combination of medicines that have historically included the amlodipine and diltiazem.  He has been seen in the [facility].  He also was seen in the [facility] in [place] a few years ago and had a nuclear stress test.    Chart records indicate the patient has a significant family history of premature coronary artery disease.  The most recent lipid panel that I could locate in the chart record is from 2021 at which time HDL was 27, triglycerides 318, LDL of 31.  More recent lab work through [facility] include a white count of 8.2, hemoglobin of 11.7, hematocrit 37.4, platelet count of 346, cholesterol 140, LDL 66, triglycerides 209, HDL 33.  These laboratory studies are from 2023.  Sodium of 140, potassium of 4.3, glucose of 80, creatinine 0.9, BUN of 13 with a calcium of 9.4, AST of 27.    Family history includes 2 uncles with premature heart disease in their 50s father  at age 30 from agent orange, mother with bilateral carotid endarterectomy in her 60s.  Grandfather with a myocardial infarction at age 52.    Cardiovascular risk factors include prior tobacco and chewing tobacco use.  He now utilizes nicotine lozenges.  Significant family history.  History of prior methamphetamine abuse.  Patient is a an over[place] road .    Past medical history   1 coronary artery  vasospasm.  2.  Hypertension  3.  G6PD deficiency  4.  History of prior alcohol abuse  5.  History of chewing tobacco use  6.  History of bilateral carpal tunnel  7.  History of obstructive sleep apnea  8.  Bilateral knee replacement surgery.            Per outside cardiology:    He has a long history of chest pain with a previous coronary angiogram in 2016 with acetylcholine injection documenting symptomatic coronary artery spasm with reproduction of his symptoms. There was no significant blockage in the coronary arteries at that time.    Since that time, he has been treated with medical therapy for chest discomfort. I last saw him in 2020. At that time, he described a burning sensation in the center of his chest that seem to come on with exertion and was relieved by rest. He describes continued similar sensation. Over the last few years, he has noticed also that he seems to get some pain in his left biceps after he exerts himself.    No chest pain at rest. No PND or orthopnea. No lower extremity edema. Overall, his chest pain has been very similar over the past 7 years    He has stopped chewing tobacco. He is using some nicotine replacement.       Recent Echocardiogram Results:      Recent Coronary Angiogram Results:    Reading Physician Reading Date Result Priority   Cyrus Damon MD  [phone] 1/14/2021 Routine   Cyrus Damon MD  [phone] 1/14/2021      Result Text       The nuclear stress test is probably negative for inducible myocardial ischemia or infarction.    Nuclear Study Quality: The  images demonstrate diaphragmatic attenuation.  Less likely would be small area of nontransmural infarct.  No convincing ischemia.    The left ventricular ejection fraction at stress is 70%.    There is no prior study for comparison.      IMPRESSION:   1.  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 The vertebrobasilar   system and cerebellar branches are patent. This finding may be   incidental, though could potentially result in vertebrobasilar   insufficiency given history of dizziness.   2.  No stenosis of the carotid bifurcations.   Preliminary results provided by Barbie Zhang M.D. at 22:39 PDT on 7/5/2021.     Created:7/5/2021 22:39 PDT;[id number]  Narrative    RADIOLOGICAL CONSULTATION: CTA CAROTID INTRACRANIAL  7/5/2021 22:35 PDT           Physical Examination  Review of Systems   Vitals: 132/66, weight 262 pounds, heart rate of 79 and regular  Wt Readings from Last 3 Encounters:   01/14/21 113.5 kg (250 lb 4.8 oz)   12/17/19 115.1 kg (253 lb 12.8 oz)   12/17/19 120.2 kg (265 lb)       General Appearance:   no distress, normal body habitus   ENT/Mouth: membranes moist,     EYES:  no scleral icterus, normal conjunctivae   Neck: no carotid bruits or thyromegaly   Chest/Lungs:   lungs are clear to auscultation, no rales or wheezing,  sternal scar, equal chest wall expansion    Cardiovascular:   Regular. Normal first and second heart sounds with no murmurs, rubs, or gallops; the carotid, radial and posterior tibial pulses are intact, Jugular venous pressure within normal limits, no significant edema bilaterally    Abdomen:  no  bruits, or tenderness; bowel sounds are present   Extremities: no cyanosis or clubbing   Skin: no xanthelasma, warm.    Neurologic: , no tremors     Psychiatric: alert and oriented x3, calm        Please refer above for cardiac ROS details.        Medical History  Surgical History Family History Social History   Past Medical History:   Diagnosis Date    History of blood transfusion     Hypertension     Sleep apnea     uses cpap      Past Surgical History:   Procedure Laterality Date    HERNIA REPAIR      x3 on R inguinal  side    LAPAROSCOPIC CHOLECYSTECTOMY N/A 12/18/2019    Procedure: CHOLECYSTECTOMY, LAPAROSCOPIC;  Surgeon: Therese Crow MD;  Location:  OR     No  family history on file.     Social History     Socioeconomic History    Marital status:      Spouse name: Not on file    Number of children: Not on file    Years of education: Not on file    Highest education level: Not on file   Occupational History    Not on file   Tobacco Use    Smoking status: Unknown    Smokeless tobacco: Current     Types: Chew    Tobacco comments:     2 tins/day   Substance and Sexual Activity    Alcohol use: Never    Drug use: Never    Sexual activity: Not on file   Other Topics Concern    Parent/sibling w/ CABG, MI or angioplasty before 65F 55M? Not Asked   Social History Narrative    Not on file     Social Determinants of Health     Financial Resource Strain: Not on file   Food Insecurity: Not on file   Transportation Needs: Not on file   Physical Activity: Not on file   Stress: Not on file   Social Connections: Not on file   Intimate Partner Violence: Not on file   Housing Stability: Not on file           Medications  Allergies   Current Outpatient Medications   Medication Sig Dispense Refill    amLODIPine (NORVASC) 10 MG tablet Take 5 mg by mouth daily      aspirin 81 MG EC tablet Take 81 mg by mouth At Bedtime      atorvastatin (LIPITOR) 40 MG tablet Take 40 mg by mouth At Bedtime       diltiazem ER (DILT-XR) 180 MG 24 hr capsule Take 180 mg by mouth daily      Multiple Vitamin (MULTI-VITAMINS) TABS Take 1 tablet by mouth daily       nicotine (NICODERM CQ) 21 MG/24HR 24 hr patch Place 1 patch onto the skin every 24 hours      nicotine polacrilex (NICORETTE) 4 MG gum Place 4 mg inside cheek as needed for smoking cessation      omega 3 1000 MG CAPS Take 1 g by mouth 2 times daily       vitamin C (ASCORBIC ACID) 500 MG tablet Take 500 mg by mouth daily      vitamin D3 (CHOLECALCIFEROL) 50 mcg (2000 units) tablet Take 1 tablet by mouth daily      XIIDRA 5 % opthalmic solution Place 1 drop into both eyes 2 times daily         Allergies   Allergen Reactions    Quinine GI Disturbance  and Nausea and Vomiting     Nausea & Vomiting      Pcn [Penicillins]     Sulfa Antibiotics           Lab Results    Chemistry/lipid CBC Cardiac Enzymes/BNP/TSH/INR   Recent Labs   Lab Test 01/14/21  0505   CHOL 134   HDL 34*   LDL 79   TRIG 107     Recent Labs   Lab Test 01/14/21  0505   LDL 79     Recent Labs   Lab Test 01/13/21  2100      POTASSIUM 3.6   CHLORIDE 105   CO2 29   *   BUN 12   CR 0.72   GFRESTIMATED >90   DALE 8.2*     Recent Labs   Lab Test 01/13/21 2100 12/18/19  0620 12/17/19  1339   CR 0.72 0.77 0.81     No results for input(s): A1C in the last 63705 hours.       Recent Labs   Lab Test 01/14/21  0515   WBC 6.8   HGB 11.1*   HCT 35.0*   *        Recent Labs   Lab Test 01/14/21  0515 01/13/21 2100 12/18/19  0620   HGB 11.1* 11.3* 10.8*    No results for input(s): TROPONINI in the last 65281 hours.  No results for input(s): BNP, NTBNPI, NTBNP in the last 17336 hours.  Recent Labs   Lab Test 01/14/21  0505   TSH 3.07     Recent Labs   Lab Test 01/13/21  2100   INR 1.04        Meek Morel MD    Thank you for allowing me to participate in the care of your patient.      Sincerely,     Meek Morel MD     [facility]  cc:   No referring provider defined for this encounter.

## 2023-07-10 NOTE — H&P (VIEW-ONLY)
HEART CARE ENCOUNTER CONSULTATON NOTE      [facility]  [phone]      Assessment/Recommendations   Assessment/Plan:   1.  Chest discomfort.  Patient with a history of coronary vasospasm by outside chart record reviewed.  He also has risk factors for coronary artery disease including a reported significant family history of premature heart disease.  He underwent a coronary angiogram in 2016 that was reported positive  acetylcholine challenge.  He has been on a combination of amlodipine, diltiazem and isosorbide mononitrate.  He endorses some increased symptoms of chest discomfort over the past 6+ months.  He now walks shorter distances and develops a burning sensation in his chest and it takes longer for it to resolve.  He does not have symptoms at rest.  However previously he had to be more rigorous in his exercise pursuits and now it occurs with less rigorous activity.  We discussed this at length today.  He does have risk factors for obstructive coronary artery disease in the last coronary angiogram was 2016.  We discussed coronary angiography and possible coronary intervention and additional flow studies versus CT angiogram.  I compared and contrasted both.  I discussed the risks of coronary angiography  Discussed with the patient the risks and benefits of left heat catheterization with coronary angiogram including possible PCI. The risks include but are not limited to death, myocardial infarction, stroke, kidney dysfunction, vessel trauma, hemorrhage, need for emergency corrective surgery, allergy, and dysrhythmia.  After extensive discussion he wishes to proceed to coronary angiography and possible coronary intervention.  I think given his history and ongoing symptoms of chest discomfort this is reasonable and we will arrange in the near future.  I recommended that we increase the isosorbide mononitrate to 120 mg daily and gave him a new prescription.  I recommended the use of  sublingual nitroglycerin and gave him prescription.  I asked him to seek more immediate attention if symptoms are worsening or progressive.      Plan 1.  Coronary angiogram with possible coronary intervention and possible flow studies.  2.  Increase isosorbide mononitrate to 120 mg daily.  3.  As needed sublingual nitroglycerin.  4.  I elected to continue with the current combination of medications in the short-term.  There had been discussion with respect over normalizing and L-arginine in prior chart notes.  5.  It is recommended that he seek more immediate attention if symptoms are worsening or progressive.  6.  Follow-up in a few months pending the above.  7.  Ongoing risk modification.  Prudent diet.  Complete nicotine cessation discussed.  Most recent lipids are reviewed from below.  He has a low HDL.    EGD today demonstrates sinus rhythm, right bundle branch block, here is an EKG from January 2021 where there was possible inferior infarct but no right bundle branch block at that time.             History of Present Illness/Subjective    HPI: Kemal Rodriguez is a 53 year old male seen as a new patient.  Chart notes are reviewed.  He has follow-up with outside cardiology with a history of chest discomfort.  Patient had a coronary angiogram in 2016 that demonstrated symptomatic coronary artery spasm with acetylcholine injection.  There is no significant obstructive disease reported at that time.  The most recent note is from August 2022.  He has received medical therapy for chest discomfort.  He has been utilizing chewing tobacco at that time.  Chart records indicate that he has been on a combination of amlodipine and diltiazem.  He was started on Imdur August 2022 as he was no longer taking Cialis.    He reports to me today that the chest discomfort symptoms have progressed.  He walks shorter distances and develops a burning sensation in his chest that slowly resolves with rest.  He recalls a relatively  recent episode however then it took approximately 45 minutes of rest for it to resolve.  He also endorses some intermittent shortness of breath.  The chest discomfort does not occur at rest but the shortness of breath symptoms can intermittently occur at rest.  He has no history of orthopnea or PND.  He has had no symptoms of syncope or near syncope and tolerates the current combination of medicines that have historically included the amlodipine and diltiazem.  He has been seen in the [facility] system.  He also was seen in the [facility] in [place] a few years ago and had a nuclear stress test.    Chart records indicate the patient has a significant family history of premature coronary artery disease.  The most recent lipid panel that I could locate in the chart record is from 2021 at which time HDL was 27, triglycerides 318, LDL of 31.  More recent lab work through [facility] include a white count of 8.2, hemoglobin of 11.7, hematocrit 37.4, platelet count of 346, cholesterol 140, LDL 66, triglycerides 209, HDL 33.  These laboratory studies are from 2023.  Sodium of 140, potassium of 4.3, glucose of 80, creatinine 0.9, BUN of 13 with a calcium of 9.4, AST of 27.    Family history includes 2 uncles with premature heart disease in their 50s father  at age 30 from agent orange, mother with bilateral carotid endarterectomy in her 60s.  Grandfather with a myocardial infarction at age 52.    Cardiovascular risk factors include prior tobacco and chewing tobacco use.  He now utilizes nicotine lozenges.  Significant family history.  History of prior methamphetamine abuse.  Patient is a an over[place] road .    Past medical history   1 coronary artery vasospasm.  2.  Hypertension  3.  G6PD deficiency  4.  History of prior alcohol abuse  5.  History of chewing tobacco use  6.  History of bilateral carpal tunnel  7.  History of obstructive sleep apnea  8.  Bilateral knee  replacement surgery.            Per outside cardiology:    He has a long history of chest pain with a previous coronary angiogram in 2016 with acetylcholine injection documenting symptomatic coronary artery spasm with reproduction of his symptoms. There was no significant blockage in the coronary arteries at that time.    Since that time, he has been treated with medical therapy for chest discomfort. I last saw him in 2020. At that time, he described a burning sensation in the center of his chest that seem to come on with exertion and was relieved by rest. He describes continued similar sensation. Over the last few years, he has noticed also that he seems to get some pain in his left biceps after he exerts himself.    No chest pain at rest. No PND or orthopnea. No lower extremity edema. Overall, his chest pain has been very similar over the past 7 years    He has stopped chewing tobacco. He is using some nicotine replacement.       Recent Echocardiogram Results:      Recent Coronary Angiogram Results:    Reading Physician Reading Date Result Priority   Cyrus Damon MD  [phone] 1/14/2021 Routine   Cyrus Damon MD  [phone] 1/14/2021      Result Text        The nuclear stress test is probably negative for inducible myocardial ischemia or infarction.     Nuclear Study Quality: The  images demonstrate diaphragmatic attenuation.  Less likely would be small area of nontransmural infarct.  No convincing ischemia.     The left ventricular ejection fraction at stress is 70%.     There is no prior study for comparison.      IMPRESSION:   1.  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 The vertebrobasilar   system and cerebellar branches are patent. This finding may be   incidental, though could potentially result in vertebrobasilar   insufficiency given history of dizziness.   2.  No stenosis of  the carotid bifurcations.   Preliminary results provided by Barbie Zhang M.D. at 22:39 PDT on 7/5/2021.     Created:7/5/2021 22:39 PDT;[id number]  Narrative    RADIOLOGICAL CONSULTATION: CTA CAROTID INTRACRANIAL  7/5/2021 22:35 PDT           Physical Examination  Review of Systems   Vitals: 132/66, weight 262 pounds, heart rate of 79 and regular  Wt Readings from Last 3 Encounters:   01/14/21 113.5 kg (250 lb 4.8 oz)   12/17/19 115.1 kg (253 lb 12.8 oz)   12/17/19 120.2 kg (265 lb)       General Appearance:   no distress, normal body habitus   ENT/Mouth: membranes moist,     EYES:  no scleral icterus, normal conjunctivae   Neck: no carotid bruits or thyromegaly   Chest/Lungs:   lungs are clear to auscultation, no rales or wheezing,  sternal scar, equal chest wall expansion    Cardiovascular:   Regular. Normal first and second heart sounds with no murmurs, rubs, or gallops; the carotid, radial and posterior tibial pulses are intact, Jugular venous pressure within normal limits, no significant edema bilaterally    Abdomen:  no  bruits, or tenderness; bowel sounds are present   Extremities: no cyanosis or clubbing   Skin: no xanthelasma, warm.    Neurologic: , no tremors     Psychiatric: alert and oriented x3, calm        Please refer above for cardiac ROS details.        Medical History  Surgical History Family History Social History   Past Medical History:   Diagnosis Date     History of blood transfusion      Hypertension      Sleep apnea     uses cpap      Past Surgical History:   Procedure Laterality Date     HERNIA REPAIR      x3 on R inguinal  side     LAPAROSCOPIC CHOLECYSTECTOMY N/A 12/18/2019    Procedure: CHOLECYSTECTOMY, LAPAROSCOPIC;  Surgeon: Therese Crow MD;  Location: [facility] OR     No family history on file.     Social History     Socioeconomic History     Marital status:      Spouse name: Not on file     Number of children: Not on file     Years of education: Not on file     Highest education  level: Not on file   Occupational History     Not on file   Tobacco Use     Smoking status: Unknown     Smokeless tobacco: Current     Types: Chew     Tobacco comments:     2 tins/day   Substance and Sexual Activity     Alcohol use: Never     Drug use: Never     Sexual activity: Not on file   Other Topics Concern     Parent/sibling w/ CABG, MI or angioplasty before 65F 55M? Not Asked   Social History Narrative     Not on file     Social Determinants of Health     Financial Resource Strain: Not on file   Food Insecurity: Not on file   Transportation Needs: Not on file   Physical Activity: Not on file   Stress: Not on file   Social Connections: Not on file   Intimate Partner Violence: Not on file   Housing Stability: Not on file           Medications  Allergies   Current Outpatient Medications   Medication Sig Dispense Refill     amLODIPine (NORVASC) 10 MG tablet Take 5 mg by mouth daily       aspirin 81 MG EC tablet Take 81 mg by mouth At Bedtime       atorvastatin (LIPITOR) 40 MG tablet Take 40 mg by mouth At Bedtime        diltiazem ER (DILT-XR) 180 MG 24 hr capsule Take 180 mg by mouth daily       Multiple Vitamin (MULTI-VITAMINS) TABS Take 1 tablet by mouth daily        nicotine (NICODERM CQ) 21 MG/24HR 24 hr patch Place 1 patch onto the skin every 24 hours       nicotine polacrilex (NICORETTE) 4 MG gum Place 4 mg inside cheek as needed for smoking cessation       omega 3 1000 MG CAPS Take 1 g by mouth 2 times daily        vitamin C (ASCORBIC ACID) 500 MG tablet Take 500 mg by mouth daily       vitamin D3 (CHOLECALCIFEROL) 50 mcg (2000 units) tablet Take 1 tablet by mouth daily       XIIDRA 5 % opthalmic solution Place 1 drop into both eyes 2 times daily         Allergies   Allergen Reactions     Quinine GI Disturbance and Nausea and Vomiting     Nausea & Vomiting       Pcn [Penicillins]      Sulfa Antibiotics           Lab Results    Chemistry/lipid CBC Cardiac Enzymes/BNP/TSH/INR   Recent Labs   Lab Test  01/14/21  0505   CHOL 134   HDL 34*   LDL 79   TRIG 107     Recent Labs   Lab Test 01/14/21  0505   LDL 79     Recent Labs   Lab Test 01/13/21 2100      POTASSIUM 3.6   CHLORIDE 105   CO2 29   *   BUN 12   CR 0.72   GFRESTIMATED >90   DALE 8.2*     Recent Labs   Lab Test 01/13/21 2100 12/18/19  0620 12/17/19  1339   CR 0.72 0.77 0.81     No results for input(s): A1C in the last 05699 hours.       Recent Labs   Lab Test 01/14/21  0515   WBC 6.8   HGB 11.1*   HCT 35.0*   *        Recent Labs   Lab Test 01/14/21  0515 01/13/21 2100 12/18/19  0620   HGB 11.1* 11.3* 10.8*    No results for input(s): TROPONINI in the last 87077 hours.  No results for input(s): BNP, NTBNPI, NTBNP in the last 62537 hours.  Recent Labs   Lab Test 01/14/21  0505   TSH 3.07     Recent Labs   Lab Test 01/13/21  2100   INR 1.04        Meek Morel MD

## 2023-07-10 NOTE — PROGRESS NOTES
HEART CARE ENCOUNTER CONSULTATON NOTE      [facility]  [phone]      Assessment/Recommendations   Assessment/Plan:   1.  Chest discomfort.  Patient with a history of coronary vasospasm by outside chart record reviewed.  He also has risk factors for coronary artery disease including a reported significant family history of premature heart disease.  He underwent a coronary angiogram in 2016 that was reported positive  acetylcholine challenge.  He has been on a combination of amlodipine, diltiazem and isosorbide mononitrate.  He endorses some increased symptoms of chest discomfort over the past 6+ months.  He now walks shorter distances and develops a burning sensation in his chest and it takes longer for it to resolve.  He does not have symptoms at rest.  However previously he had to be more rigorous in his exercise pursuits and now it occurs with less rigorous activity.  We discussed this at length today.  He does have risk factors for obstructive coronary artery disease in the last coronary angiogram was 2016.  We discussed coronary angiography and possible coronary intervention and additional flow studies versus CT angiogram.  I compared and contrasted both.  I discussed the risks of coronary angiography  Discussed with the patient the risks and benefits of left heat catheterization with coronary angiogram including possible PCI. The risks include but are not limited to death, myocardial infarction, stroke, kidney dysfunction, vessel trauma, hemorrhage, need for emergency corrective surgery, allergy, and dysrhythmia.  After extensive discussion he wishes to proceed to coronary angiography and possible coronary intervention.  I think given his history and ongoing symptoms of chest discomfort this is reasonable and we will arrange in the near future.  I recommended that we increase the isosorbide mononitrate to 120 mg daily and gave him a new prescription.  I recommended the use of  sublingual nitroglycerin and gave him prescription.  I asked him to seek more immediate attention if symptoms are worsening or progressive.      Plan 1.  Coronary angiogram with possible coronary intervention and possible flow studies.  2.  Increase isosorbide mononitrate to 120 mg daily.  3.  As needed sublingual nitroglycerin.  4.  I elected to continue with the current combination of medications in the short-term.  There had been discussion with respect over normalizing and L-arginine in prior chart notes.  5.  It is recommended that he seek more immediate attention if symptoms are worsening or progressive.  6.  Follow-up in a few months pending the above.  7.  Ongoing risk modification.  Prudent diet.  Complete nicotine cessation discussed.  Most recent lipids are reviewed from below.  He has a low HDL.    EGD today demonstrates sinus rhythm, right bundle branch block, here is an EKG from January 2021 where there was possible inferior infarct but no right bundle branch block at that time.             History of Present Illness/Subjective    HPI: Kemal Rodriguez is a 53 year old male seen as a new patient.  Chart notes are reviewed.  He has follow-up with outside cardiology with a history of chest discomfort.  Patient had a coronary angiogram in 2016 that demonstrated symptomatic coronary artery spasm with acetylcholine injection.  There is no significant obstructive disease reported at that time.  The most recent note is from August 2022.  He has received medical therapy for chest discomfort.  He has been utilizing chewing tobacco at that time.  Chart records indicate that he has been on a combination of amlodipine and diltiazem.  He was started on Imdur August 2022 as he was no longer taking Cialis.    He reports to me today that the chest discomfort symptoms have progressed.  He walks shorter distances and develops a burning sensation in his chest that slowly resolves with rest.  He recalls a relatively  recent episode however then it took approximately 45 minutes of rest for it to resolve.  He also endorses some intermittent shortness of breath.  The chest discomfort does not occur at rest but the shortness of breath symptoms can intermittently occur at rest.  He has no history of orthopnea or PND.  He has had no symptoms of syncope or near syncope and tolerates the current combination of medicines that have historically included the amlodipine and diltiazem.  He has been seen in the [facility] system.  He also was seen in the [facility] in [place] a few years ago and had a nuclear stress test.    Chart records indicate the patient has a significant family history of premature coronary artery disease.  The most recent lipid panel that I could locate in the chart record is from 2021 at which time HDL was 27, triglycerides 318, LDL of 31.  More recent lab work through [facility] include a white count of 8.2, hemoglobin of 11.7, hematocrit 37.4, platelet count of 346, cholesterol 140, LDL 66, triglycerides 209, HDL 33.  These laboratory studies are from 2023.  Sodium of 140, potassium of 4.3, glucose of 80, creatinine 0.9, BUN of 13 with a calcium of 9.4, AST of 27.    Family history includes 2 uncles with premature heart disease in their 50s father  at age 30 from agent orange, mother with bilateral carotid endarterectomy in her 60s.  Grandfather with a myocardial infarction at age 52.    Cardiovascular risk factors include prior tobacco and chewing tobacco use.  He now utilizes nicotine lozenges.  Significant family history.  History of prior methamphetamine abuse.  Patient is a an over-the road .    Past medical history   1 coronary artery vasospasm.  2.  Hypertension  3.  G6PD deficiency  4.  History of prior alcohol abuse  5.  History of chewing tobacco use  6.  History of bilateral carpal tunnel  7.  History of obstructive sleep apnea  8.  Bilateral knee  replacement surgery.            Per outside cardiology:    He has a long history of chest pain with a previous coronary angiogram in 2016 with acetylcholine injection documenting symptomatic coronary artery spasm with reproduction of his symptoms. There was no significant blockage in the coronary arteries at that time.    Since that time, he has been treated with medical therapy for chest discomfort. I last saw him in 2020. At that time, he described a burning sensation in the center of his chest that seem to come on with exertion and was relieved by rest. He describes continued similar sensation. Over the last few years, he has noticed also that he seems to get some pain in his left biceps after he exerts himself.    No chest pain at rest. No PND or orthopnea. No lower extremity edema. Overall, his chest pain has been very similar over the past 7 years    He has stopped chewing tobacco. He is using some nicotine replacement.       Recent Echocardiogram Results:      Recent Coronary Angiogram Results:    Reading Physician Reading Date Result Priority   Cyrus Damon MD  [phone] 1/14/2021 Routine   Cyrus Damon MD  [phone] 1/14/2021      Result Text        The nuclear stress test is probably negative for inducible myocardial ischemia or infarction.     Nuclear Study Quality: The  images demonstrate diaphragmatic attenuation.  Less likely would be small area of nontransmural infarct.  No convincing ischemia.     The left ventricular ejection fraction at stress is 70%.     There is no prior study for comparison.      IMPRESSION:   1.  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 The vertebrobasilar   system and cerebellar branches are patent. This finding may be   incidental, though could potentially result in vertebrobasilar   insufficiency given history of dizziness.   2.  No stenosis of  the carotid bifurcations.   Preliminary results provided by Barbie Zhang M.D. at 22:39 PDT on 7/5/2021.     Created:7/5/2021 22:39 PDT;[id number]  Narrative    RADIOLOGICAL CONSULTATION: CTA CAROTID INTRACRANIAL  7/5/2021 22:35 PDT           Physical Examination  Review of Systems   Vitals: 132/66, weight 262 pounds, heart rate of 79 and regular  Wt Readings from Last 3 Encounters:   01/14/21 113.5 kg (250 lb 4.8 oz)   12/17/19 115.1 kg (253 lb 12.8 oz)   12/17/19 120.2 kg (265 lb)       General Appearance:   no distress, normal body habitus   ENT/Mouth: membranes moist,     EYES:  no scleral icterus, normal conjunctivae   Neck: no carotid bruits or thyromegaly   Chest/Lungs:   lungs are clear to auscultation, no rales or wheezing,  sternal scar, equal chest wall expansion    Cardiovascular:   Regular. Normal first and second heart sounds with no murmurs, rubs, or gallops; the carotid, radial and posterior tibial pulses are intact, Jugular venous pressure within normal limits, no significant edema bilaterally    Abdomen:  no  bruits, or tenderness; bowel sounds are present   Extremities: no cyanosis or clubbing   Skin: no xanthelasma, warm.    Neurologic: , no tremors     Psychiatric: alert and oriented x3, calm        Please refer above for cardiac ROS details.        Medical History  Surgical History Family History Social History   Past Medical History:   Diagnosis Date     History of blood transfusion      Hypertension      Sleep apnea     uses cpap      Past Surgical History:   Procedure Laterality Date     HERNIA REPAIR      x3 on R inguinal  side     LAPAROSCOPIC CHOLECYSTECTOMY N/A 12/18/2019    Procedure: CHOLECYSTECTOMY, LAPAROSCOPIC;  Surgeon: Therese Crow MD;  Location: [facility] OR     No family history on file.     Social History     Socioeconomic History     Marital status:      Spouse name: Not on file     Number of children: Not on file     Years of education: Not on file     Highest education  level: Not on file   Occupational History     Not on file   Tobacco Use     Smoking status: Unknown     Smokeless tobacco: Current     Types: Chew     Tobacco comments:     2 tins/day   Substance and Sexual Activity     Alcohol use: Never     Drug use: Never     Sexual activity: Not on file   Other Topics Concern     Parent/sibling w/ CABG, MI or angioplasty before 65F 55M? Not Asked   Social History Narrative     Not on file     Social Determinants of Health     Financial Resource Strain: Not on file   Food Insecurity: Not on file   Transportation Needs: Not on file   Physical Activity: Not on file   Stress: Not on file   Social Connections: Not on file   Intimate Partner Violence: Not on file   Housing Stability: Not on file           Medications  Allergies   Current Outpatient Medications   Medication Sig Dispense Refill     amLODIPine (NORVASC) 10 MG tablet Take 5 mg by mouth daily       aspirin 81 MG EC tablet Take 81 mg by mouth At Bedtime       atorvastatin (LIPITOR) 40 MG tablet Take 40 mg by mouth At Bedtime        diltiazem ER (DILT-XR) 180 MG 24 hr capsule Take 180 mg by mouth daily       Multiple Vitamin (MULTI-VITAMINS) TABS Take 1 tablet by mouth daily        nicotine (NICODERM CQ) 21 MG/24HR 24 hr patch Place 1 patch onto the skin every 24 hours       nicotine polacrilex (NICORETTE) 4 MG gum Place 4 mg inside cheek as needed for smoking cessation       omega 3 1000 MG CAPS Take 1 g by mouth 2 times daily        vitamin C (ASCORBIC ACID) 500 MG tablet Take 500 mg by mouth daily       vitamin D3 (CHOLECALCIFEROL) 50 mcg (2000 units) tablet Take 1 tablet by mouth daily       XIIDRA 5 % opthalmic solution Place 1 drop into both eyes 2 times daily         Allergies   Allergen Reactions     Quinine GI Disturbance and Nausea and Vomiting     Nausea & Vomiting       Pcn [Penicillins]      Sulfa Antibiotics           Lab Results    Chemistry/lipid CBC Cardiac Enzymes/BNP/TSH/INR   Recent Labs   Lab Test  01/14/21  0505   CHOL 134   HDL 34*   LDL 79   TRIG 107     Recent Labs   Lab Test 01/14/21  0505   LDL 79     Recent Labs   Lab Test 01/13/21 2100      POTASSIUM 3.6   CHLORIDE 105   CO2 29   *   BUN 12   CR 0.72   GFRESTIMATED >90   DALE 8.2*     Recent Labs   Lab Test 01/13/21 2100 12/18/19  0620 12/17/19  1339   CR 0.72 0.77 0.81     No results for input(s): A1C in the last 65980 hours.       Recent Labs   Lab Test 01/14/21  0515   WBC 6.8   HGB 11.1*   HCT 35.0*   *        Recent Labs   Lab Test 01/14/21  0515 01/13/21 2100 12/18/19  0620   HGB 11.1* 11.3* 10.8*    No results for input(s): TROPONINI in the last 92782 hours.  No results for input(s): BNP, NTBNPI, NTBNP in the last 61726 hours.  Recent Labs   Lab Test 01/14/21  0505   TSH 3.07     Recent Labs   Lab Test 01/13/21  2100   INR 1.04        Meek Morel MD

## 2023-07-10 NOTE — TELEPHONE ENCOUNTER
Med list updated per discussion with pt.  Case requested.  Message sent to procedure .    Email: [email or web address].  -[facility]    ----- Message from Meek Morel MD sent at 7/10/2023  8:23 AM CDT -----  Hi  I met this patient today.Can we please arrange a cor possible with possible flow study for coronary vasospasm as soon as possible.I also ordered an echocardiogram. Thank you, HOMAR Morel

## 2023-07-10 NOTE — PATIENT INSTRUCTIONS
Nice to meet you today.We are going to plan a coronary angiogram with possible stent if needed and flow studies.I write to my nurse Chioma to help us coordinate.Her number is [phone].We decide to increase the Imdur to 120mg daily.Please monitor for dizziness or headache.I also sent a prescription for nitroglycerin tablets to use if you have a more persistent chest discomfort.Take while seated as it can also contribute to dizziness.

## 2023-07-11 ENCOUNTER — LAB (OUTPATIENT)
Dept: CARDIOLOGY | Facility: CLINIC | Age: 53
End: 2023-07-11
Payer: COMMERCIAL

## 2023-07-11 ENCOUNTER — TELEPHONE (OUTPATIENT)
Dept: CARDIOLOGY | Facility: CLINIC | Age: 53
End: 2023-07-11

## 2023-07-11 DIAGNOSIS — R07.89 OTHER CHEST PAIN: ICD-10-CM

## 2023-07-11 DIAGNOSIS — I25.83 CORONARY ARTERY DISEASE DUE TO LIPID RICH PLAQUE: ICD-10-CM

## 2023-07-11 DIAGNOSIS — I25.10 CORONARY ARTERY DISEASE DUE TO LIPID RICH PLAQUE: ICD-10-CM

## 2023-07-11 LAB
ANION GAP SERPL CALCULATED.3IONS-SCNC: 9 MMOL/L (ref 5–18)
BUN SERPL-MCNC: 13 MG/DL (ref 8–22)
CALCIUM SERPL-MCNC: 9.3 MG/DL (ref 8.5–10.5)
CHLORIDE BLD-SCNC: 105 MMOL/L (ref 98–107)
CO2 SERPL-SCNC: 27 MMOL/L (ref 22–31)
CREAT SERPL-MCNC: 0.92 MG/DL (ref 0.7–1.3)
ERYTHROCYTE [DISTWIDTH] IN BLOOD BY AUTOMATED COUNT: 11.4 % (ref 10–15)
GFR SERPL CREATININE-BSD FRML MDRD: >90 ML/MIN/1.73M2
GLUCOSE BLD-MCNC: 108 MG/DL (ref 70–125)
HCT VFR BLD AUTO: 37.8 % (ref 40–53)
HGB BLD-MCNC: 12.3 G/DL (ref 13.3–17.7)
MCH RBC QN AUTO: 34.2 PG (ref 26.5–33)
MCHC RBC AUTO-ENTMCNC: 32.5 G/DL (ref 31.5–36.5)
MCV RBC AUTO: 105 FL (ref 78–100)
PLATELET # BLD AUTO: 362 10E3/UL (ref 150–450)
POTASSIUM BLD-SCNC: 3.8 MMOL/L (ref 3.5–5)
RBC # BLD AUTO: 3.6 10E6/UL (ref 4.4–5.9)
SODIUM SERPL-SCNC: 141 MMOL/L (ref 136–145)
WBC # BLD AUTO: 8.9 10E3/UL (ref 4–11)

## 2023-07-11 PROCEDURE — 86850 RBC ANTIBODY SCREEN: CPT

## 2023-07-11 PROCEDURE — 36415 COLL VENOUS BLD VENIPUNCTURE: CPT

## 2023-07-11 PROCEDURE — 85027 COMPLETE CBC AUTOMATED: CPT

## 2023-07-11 PROCEDURE — 86901 BLOOD TYPING SEROLOGIC RH(D): CPT

## 2023-07-11 PROCEDURE — 80048 BASIC METABOLIC PNL TOTAL CA: CPT

## 2023-07-11 PROCEDURE — 86900 BLOOD TYPING SEROLOGIC ABO: CPT

## 2023-07-11 NOTE — TELEPHONE ENCOUNTER
----- Message from Néstor Mendes sent at 7/11/2023  9:17 AM CDT -----  Regarding: RE: MDG = ca poss pci  Case type: CA POSS PCI    Procedure Physician(s): DR SHEEHAN AND MY    Procedure Date and Patient Arrival Time: Monday 7/17, with arrival time of 630AM    H&P: Completed on 7/10 MG    Pre-Procedure Lab Appt: Tuesday 7/11 at  - Please place lab orders if you haven't already!    Alerts/Important Info: SLEEP APNEA    THANKS!  MYLES   ----- Message -----  From: Chioma Huggins RN  Sent: 7/10/2023   4:38 PM CDT  To: [facility] Procedure  Pool - [facility]  Subject: MDG = ca poss pci                                Case Type: CA poss PCI    Ordering Provider: Dr Morel    H&P: 7/10/23 Dr Morel    Alerts: sleep apnea    Additional Info/Urgency: asap per pt - needs for pre-op, and is available Mon/Tues    Orders for Pre-Procedure Labs? BMP, CBC, Lipids, type & screen are in.

## 2023-07-12 ENCOUNTER — PREP FOR PROCEDURE (OUTPATIENT)
Dept: CARDIOLOGY | Facility: CLINIC | Age: 53
End: 2023-07-12
Payer: COMMERCIAL

## 2023-07-12 DIAGNOSIS — I25.10 CORONARY ARTERY DISEASE DUE TO LIPID RICH PLAQUE: ICD-10-CM

## 2023-07-12 DIAGNOSIS — R07.89 OTHER CHEST PAIN: Primary | ICD-10-CM

## 2023-07-12 DIAGNOSIS — I25.83 CORONARY ARTERY DISEASE DUE TO LIPID RICH PLAQUE: ICD-10-CM

## 2023-07-12 RX ORDER — ASPIRIN 81 MG/1
243 TABLET, CHEWABLE ORAL ONCE
Status: CANCELLED | OUTPATIENT
Start: 2023-07-17

## 2023-07-12 RX ORDER — LIDOCAINE 40 MG/G
CREAM TOPICAL
Status: CANCELLED | OUTPATIENT
Start: 2023-07-12

## 2023-07-12 RX ORDER — SODIUM CHLORIDE 9 MG/ML
INJECTION, SOLUTION INTRAVENOUS CONTINUOUS
Status: CANCELLED | OUTPATIENT
Start: 2023-07-17

## 2023-07-12 RX ORDER — FENTANYL CITRATE 50 UG/ML
25 INJECTION, SOLUTION INTRAMUSCULAR; INTRAVENOUS
Status: CANCELLED | OUTPATIENT
Start: 2023-07-17

## 2023-07-12 RX ORDER — ASPIRIN 325 MG
325 TABLET ORAL ONCE
Status: CANCELLED | OUTPATIENT
Start: 2023-07-17 | End: 2023-07-12

## 2023-07-12 NOTE — TELEPHONE ENCOUNTER
Kemal Rodriguez   [address] [address]  [phone] (home)     PCP:  Juanita Sorenson  H&P completed by:  07/10/23 Dr Morel  Admit date:  07/17/23 Arrival time:  6:30 AM  Anticoagulation:  NA  Previous PCI: Yes  Bypass Grafts: No  Renal Issues: No  Diabetic?: No  Device?: No    Ambulation status: independent     Reason for Visit:  Telephone call to discuss pre-procedure education in preparation for: Coronary Angiogram with Possible PCI    Procedure Prep:  EKG results obtained, dated: To be completed on day of cath lab procedure  Hemogram results obtained: Completed on 7/11/23  Basic Metabolic Panel results obtained: Completed on 7/11/23  Pertinent cardiac test results: 7/12/16 cardiac cath at [place]    Patient Education    1. Your arrival time is 6:30 AM.  [place] [address] - Main Entrance of the Hospital  2. Please plan on being at the hospital all day.  3. At any time, emergencies and/or urgent cases may come up which could delay the start of your procedure.    COVID Testing Instructions  *Mandatory COVID Testing:   ALL Patients will need to complete a COVID test no sooner than 4 days prior to their procedure (regardless of vaccination status).      To schedule COVID testing Please call [phone]    If you want to complete this at an outside facility please call them to find out if they will have the results within the appropriate time frame and their fax number.  You will need to provide us with that information so we can send the order.    The facility completing the test will need to fax the results to [phone]    If you are running into and issues please call us.     Pre-procedure instructions  Take your temperature in the morning prior to coming in.  If your temperature is 100 F please call  [facility] [phone] and notify them.  If you do not have access to a thermometer at home, please come in for  testing.  If you are running a temperature your procedure may be rescheduled.  Patient instructed to not Eat or Drink after midnight.  Patient instructed to shower the evening before or the morning of the procedure.  Patient instructed to arrange for transportation home following procedure from a responsible family member or friend. No driving for at least 24 hours.  Patient instructed to have a responsible adult with them for 24 hours post-procedure.  Post-procedure follow up process.  Conscious sedation discussed.      Pre-procedure medication instructions.  Continue medications as scheduled, with a small amount of water on the day of the procedure unless indicated. (NO Diabetic Medications or Blood Thinners)  Pt instructed not to consume Alcohol, Tobacco, Caffeine, or Carbonated beverages 12 hours prior to procedure.  Patient instructed to take 324 mg of Aspirin the morning of procedure: Yes  Other medication: instructed to only take amlodipine, diltiazem, and isosorbide a.m. of the procedure.    Patient states understanding of procedure and agrees to proceed.    *PATIENTS RECORDS AVAILABLE IN [facility] UNLESS OTHERWISE INDICATED*      Patient Active Problem List   Diagnosis     Biliary colic     Other chest pain     Class 2 severe obesity due to excess calories with serious comorbidity in adult (H)       Current Outpatient Medications   Medication Sig Dispense Refill     amLODIPine (NORVASC) 10 MG tablet Take 10 mg by mouth daily       aspirin 81 MG EC tablet Take 81 mg by mouth At Bedtime       atorvastatin (LIPITOR) 40 MG tablet Take 40 mg by mouth At Bedtime        buPROPion (WELLBUTRIN XL) 300 MG 24 hr tablet Take 300 mg by mouth every morning       cyclobenzaprine (FLEXERIL) 10 MG tablet Take 10 mg by mouth 3 times daily as needed       diltiazem ER (DILT-XR) 180 MG 24 hr capsule Take 180 mg by mouth daily       isosorbide mononitrate (IMDUR) 30 MG 24 hr tablet Take 4 tablets (120 mg) by mouth daily 300 tablet 4      Multiple Vitamin (MULTI-VITAMINS) TABS Take 1 tablet by mouth daily        nicotine (NICORETTE) 4 MG lozenge Place 4 mg inside cheek every hour as needed       nitroGLYcerin (NITROSTAT) 0.4 MG sublingual tablet For chest pain place 1 tablet under the tongue every 5 minutes for 3 doses. If symptoms persist 5 minutes after 1st dose call 911. 25 tablet 4     omega 3 1000 MG CAPS Take 1 g by mouth 2 times daily        tamsulosin (FLOMAX) 0.4 MG capsule Take 0.4 mg by mouth daily       vitamin C (ASCORBIC ACID) 500 MG tablet Take 500 mg by mouth daily       vitamin D3 (CHOLECALCIFEROL) 50 mcg (2000 units) tablet Take 1 tablet by mouth daily       XIIDRA 5 % opthalmic solution Place 1 drop into both eyes 2 times daily (Patient not taking: Reported on 7/10/2023)         Allergies   Allergen Reactions     Quinine GI Disturbance and Nausea and Vomiting     Nausea & Vomiting       Aspirin      Other reaction(s): Other (see comments)  PN: LW Reaction: Blood disorder  PN: LW Reaction: Blood disorder       Pcn [Penicillins]      Sulfa Antibiotics        Chioma Huggins RN on 7/12/2023 at 4:36 PM

## 2023-07-17 ENCOUNTER — HOSPITAL ENCOUNTER (OUTPATIENT)
Facility: HOSPITAL | Age: 53
Discharge: HOME | End: 2023-07-17
Admitting: INTERNAL MEDICINE
Payer: COMMERCIAL

## 2023-07-17 ENCOUNTER — TELEPHONE (OUTPATIENT)
Dept: CARDIOLOGY | Facility: CLINIC | Age: 53
End: 2023-07-17

## 2023-07-17 ENCOUNTER — HOSPITAL ENCOUNTER (OUTPATIENT)
Dept: CARDIOLOGY | Facility: HOSPITAL | Age: 53
Discharge: HOME | End: 2023-07-17
Attending: INTERNAL MEDICINE
Payer: COMMERCIAL

## 2023-07-17 VITALS
DIASTOLIC BLOOD PRESSURE: 69 MMHG | WEIGHT: 260 LBS | HEART RATE: 68 BPM | SYSTOLIC BLOOD PRESSURE: 138 MMHG | TEMPERATURE: 98.4 F | BODY MASS INDEX: 35.21 KG/M2 | OXYGEN SATURATION: 96 % | HEIGHT: 72 IN | RESPIRATION RATE: 16 BRPM

## 2023-07-17 DIAGNOSIS — I25.10 CORONARY ARTERY DISEASE DUE TO LIPID RICH PLAQUE: ICD-10-CM

## 2023-07-17 DIAGNOSIS — R07.89 OTHER CHEST PAIN: ICD-10-CM

## 2023-07-17 DIAGNOSIS — I25.83 CORONARY ARTERY DISEASE DUE TO LIPID RICH PLAQUE: ICD-10-CM

## 2023-07-17 LAB
ATRIAL RATE - MUSE: 72 BPM
CHOLEST SERPL-MCNC: 102 MG/DL
DIASTOLIC BLOOD PRESSURE - MUSE: NORMAL MMHG
HDLC SERPL-MCNC: 29 MG/DL
INTERPRETATION ECG - MUSE: NORMAL
LDLC SERPL CALC-MCNC: 52 MG/DL
LVEF ECHO: NORMAL
NONHDLC SERPL-MCNC: 73 MG/DL
P AXIS - MUSE: 49 DEGREES
PR INTERVAL - MUSE: 202 MS
QRS DURATION - MUSE: 166 MS
QT - MUSE: 456 MS
QTC - MUSE: 499 MS
R AXIS - MUSE: -26 DEGREES
SYSTOLIC BLOOD PRESSURE - MUSE: NORMAL MMHG
T AXIS - MUSE: 34 DEGREES
TRIGL SERPL-MCNC: 106 MG/DL
VENTRICULAR RATE- MUSE: 72 BPM

## 2023-07-17 PROCEDURE — 999N000054 HC STATISTIC EKG NON-CHARGEABLE

## 2023-07-17 PROCEDURE — 80061 LIPID PANEL: CPT | Performed by: INTERNAL MEDICINE

## 2023-07-17 PROCEDURE — C1894 INTRO/SHEATH, NON-LASER: HCPCS | Performed by: INTERNAL MEDICINE

## 2023-07-17 PROCEDURE — 272N000001 HC OR GENERAL SUPPLY STERILE: Performed by: INTERNAL MEDICINE

## 2023-07-17 PROCEDURE — 93306 TTE W/DOPPLER COMPLETE: CPT | Mod: 26 | Performed by: INTERNAL MEDICINE

## 2023-07-17 PROCEDURE — 999N000099 HC STATISTIC MODERATE SEDATION < 10 MIN: Performed by: INTERNAL MEDICINE

## 2023-07-17 PROCEDURE — 36415 COLL VENOUS BLD VENIPUNCTURE: CPT | Performed by: INTERNAL MEDICINE

## 2023-07-17 PROCEDURE — 250N000009 HC RX 250: Performed by: INTERNAL MEDICINE

## 2023-07-17 PROCEDURE — 255N000002 HC RX 255 OP 636: Performed by: INTERNAL MEDICINE

## 2023-07-17 PROCEDURE — 93458 L HRT ARTERY/VENTRICLE ANGIO: CPT | Mod: 26 | Performed by: INTERNAL MEDICINE

## 2023-07-17 PROCEDURE — 93010 ELECTROCARDIOGRAM REPORT: CPT | Performed by: INTERNAL MEDICINE

## 2023-07-17 PROCEDURE — 250N000011 HC RX IP 250 OP 636: Performed by: INTERNAL MEDICINE

## 2023-07-17 PROCEDURE — 258N000003 HC RX IP 258 OP 636: Performed by: INTERNAL MEDICINE

## 2023-07-17 PROCEDURE — 93005 ELECTROCARDIOGRAM TRACING: CPT

## 2023-07-17 PROCEDURE — 93458 L HRT ARTERY/VENTRICLE ANGIO: CPT | Performed by: INTERNAL MEDICINE

## 2023-07-17 PROCEDURE — C1887 CATHETER, GUIDING: HCPCS | Performed by: INTERNAL MEDICINE

## 2023-07-17 PROCEDURE — 250N000013 HC RX MED GY IP 250 OP 250 PS 637: Performed by: INTERNAL MEDICINE

## 2023-07-17 RX ORDER — NALOXONE HYDROCHLORIDE 0.4 MG/ML
0.2 INJECTION, SOLUTION INTRAMUSCULAR; INTRAVENOUS; SUBCUTANEOUS
Status: DISCONTINUED | OUTPATIENT
Start: 2023-07-17 | End: 2023-07-17 | Stop reason: HOSPADM

## 2023-07-17 RX ORDER — ASPIRIN 325 MG
325 TABLET ORAL ONCE
Status: DISCONTINUED | OUTPATIENT
Start: 2023-07-17 | End: 2023-07-17 | Stop reason: HOSPADM

## 2023-07-17 RX ORDER — OXYCODONE HYDROCHLORIDE 5 MG/1
5 TABLET ORAL EVERY 4 HOURS PRN
Status: DISCONTINUED | OUTPATIENT
Start: 2023-07-17 | End: 2023-07-17 | Stop reason: HOSPADM

## 2023-07-17 RX ORDER — DIAZEPAM 10 MG
10 TABLET ORAL
Status: COMPLETED | OUTPATIENT
Start: 2023-07-17 | End: 2023-07-17

## 2023-07-17 RX ORDER — FENTANYL CITRATE 50 UG/ML
25 INJECTION, SOLUTION INTRAMUSCULAR; INTRAVENOUS
Status: DISCONTINUED | OUTPATIENT
Start: 2023-07-17 | End: 2023-07-17 | Stop reason: HOSPADM

## 2023-07-17 RX ORDER — SODIUM CHLORIDE 9 MG/ML
INJECTION, SOLUTION INTRAVENOUS CONTINUOUS
Status: DISCONTINUED | OUTPATIENT
Start: 2023-07-17 | End: 2023-07-17 | Stop reason: HOSPADM

## 2023-07-17 RX ORDER — IODIXANOL 320 MG/ML
INJECTION, SOLUTION INTRAVASCULAR
Status: DISCONTINUED | OUTPATIENT
Start: 2023-07-17 | End: 2023-07-17 | Stop reason: HOSPADM

## 2023-07-17 RX ORDER — PHENOL 1.4 %
10 AEROSOL, SPRAY (ML) MUCOUS MEMBRANE
COMMUNITY

## 2023-07-17 RX ORDER — ATROPINE SULFATE 0.1 MG/ML
0.5 INJECTION INTRAVENOUS
Status: DISCONTINUED | OUTPATIENT
Start: 2023-07-17 | End: 2023-07-17 | Stop reason: HOSPADM

## 2023-07-17 RX ORDER — HEPARIN SODIUM 1000 [USP'U]/ML
INJECTION, SOLUTION INTRAVENOUS; SUBCUTANEOUS
Status: DISCONTINUED | OUTPATIENT
Start: 2023-07-17 | End: 2023-07-17 | Stop reason: HOSPADM

## 2023-07-17 RX ORDER — NALOXONE HYDROCHLORIDE 0.4 MG/ML
0.4 INJECTION, SOLUTION INTRAMUSCULAR; INTRAVENOUS; SUBCUTANEOUS
Status: DISCONTINUED | OUTPATIENT
Start: 2023-07-17 | End: 2023-07-17 | Stop reason: HOSPADM

## 2023-07-17 RX ORDER — ACETAMINOPHEN 325 MG/1
650 TABLET ORAL EVERY 4 HOURS PRN
Status: DISCONTINUED | OUTPATIENT
Start: 2023-07-17 | End: 2023-07-17 | Stop reason: HOSPADM

## 2023-07-17 RX ORDER — ASPIRIN 81 MG/1
243 TABLET, CHEWABLE ORAL ONCE
Status: DISCONTINUED | OUTPATIENT
Start: 2023-07-17 | End: 2023-07-17 | Stop reason: HOSPADM

## 2023-07-17 RX ORDER — OXYCODONE HYDROCHLORIDE 5 MG/1
10 TABLET ORAL EVERY 4 HOURS PRN
Status: DISCONTINUED | OUTPATIENT
Start: 2023-07-17 | End: 2023-07-17 | Stop reason: HOSPADM

## 2023-07-17 RX ORDER — LIDOCAINE 40 MG/G
CREAM TOPICAL
Status: DISCONTINUED | OUTPATIENT
Start: 2023-07-17 | End: 2023-07-17 | Stop reason: HOSPADM

## 2023-07-17 RX ORDER — NITROGLYCERIN 5 MG/ML
VIAL (ML) INTRAVENOUS
Status: DISCONTINUED | OUTPATIENT
Start: 2023-07-17 | End: 2023-07-17 | Stop reason: HOSPADM

## 2023-07-17 RX ORDER — VERAPAMIL HYDROCHLORIDE 2.5 MG/ML
INJECTION, SOLUTION INTRAVENOUS
Status: DISCONTINUED | OUTPATIENT
Start: 2023-07-17 | End: 2023-07-17 | Stop reason: HOSPADM

## 2023-07-17 RX ORDER — FENTANYL CITRATE 50 UG/ML
INJECTION, SOLUTION INTRAMUSCULAR; INTRAVENOUS
Status: DISCONTINUED | OUTPATIENT
Start: 2023-07-17 | End: 2023-07-17 | Stop reason: HOSPADM

## 2023-07-17 RX ORDER — FLUMAZENIL 0.1 MG/ML
0.2 INJECTION, SOLUTION INTRAVENOUS
Status: DISCONTINUED | OUTPATIENT
Start: 2023-07-17 | End: 2023-07-17 | Stop reason: HOSPADM

## 2023-07-17 RX ADMIN — SODIUM CHLORIDE: 9 INJECTION, SOLUTION INTRAVENOUS at 07:26

## 2023-07-17 RX ADMIN — PERFLUTREN 3 ML: 6.52 INJECTION, SUSPENSION INTRAVENOUS at 07:42

## 2023-07-17 RX ADMIN — DIAZEPAM 10 MG: 10 TABLET ORAL at 07:35

## 2023-07-17 ASSESSMENT — ACTIVITIES OF DAILY LIVING (ADL)
ADLS_ACUITY_SCORE: 35

## 2023-07-17 NOTE — TELEPHONE ENCOUNTER
[facility] Call Center    Phone Message    May a detailed message be left on voicemail: yes     Reason for Call: Other: Patient just had an echocardiogram done earlier today and will like to know if results are available yet or not as they are needing these results to be faxed to [phone] attn to Falguni Ybarra at [facility].Please call patient back to further discuss if these results are available yet or not.     Action Taken: Other: Cardiology    Travel Screening: Not Applicable     Thank you!  [facility]

## 2023-07-17 NOTE — DISCHARGE INSTRUCTIONS

## 2023-07-17 NOTE — PRE-PROCEDURE
GENERAL PRE-PROCEDURE:   Procedure:  Cor angio with possible intervention  Date/Time:  7/17/2023 8:06 AM    Written consent obtained?: Yes    Risks and benefits: Risks, benefits and alternatives were discussed    Consent given by:  Patient  Patient states understanding of procedure being performed: Yes    Patient's understanding of procedure matches consent: Yes    Procedure consent matches procedure scheduled: Yes    Expected level of sedation:  Moderate  Appropriately NPO:  Yes  ASA Class:  3 (CP, vasospasm worked up at [place] in past, tobacco abuse (chew), obesity)  Mallampati  :  Grade 3- soft palate visible, posterior pharyngeal wall not visible  Lungs:  Lungs clear with good breath sounds bilaterally  Heart:  Normal heart sounds and rate  History & Physical reviewed:  History and physical reviewed and no updates needed  Statement of review:  I have reviewed the lab findings, diagnostic data, medications, and the plan for sedation

## 2023-07-17 NOTE — TELEPHONE ENCOUNTER
Unable to get fax through.  Called pt - he would like echo emailed to [email or web address].    Echo emailed per pt request.  LVM for pt, to call with any issues.  -tyler

## 2023-07-17 NOTE — INTERVAL H&P NOTE
I have reviewed the surgical (or preoperative) H&P that is linked to this encounter, and examined the patient. There are no significant changes    Clinical Conditions Present on Arrival:  Clinically Significant Risk Factors Present on Admission                 # Drug Induced Platelet Defect: home medication list includes an antiplatelet medication  # Obesity: Estimated body mass index is 35.26 kg/m  as calculated from the following:    Height as of this encounter: 1.829 m (6').    Weight as of this encounter: 117.9 kg (260 lb).

## 2023-07-18 ENCOUNTER — TELEPHONE (OUTPATIENT)
Dept: CARDIOLOGY | Facility: CLINIC | Age: 53
End: 2023-07-18
Payer: COMMERCIAL

## 2023-07-18 NOTE — TELEPHONE ENCOUNTER
[facility] had questions, as pt told them he is not supposed to drive.    Return call to Christal - pt is not supposed to drive for 24 hours post-procedure because he received sedation medications, no other notes found re don't drive.   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  Cath report emailed.  -yvan

## 2023-08-16 ENCOUNTER — LAB REQUISITION (OUTPATIENT)
Dept: LAB | Facility: CLINIC | Age: 53
End: 2023-08-16
Payer: COMMERCIAL

## 2023-08-16 DIAGNOSIS — Z98.1 ARTHRODESIS STATUS: ICD-10-CM

## 2023-08-16 PROCEDURE — 87070 CULTURE OTHR SPECIMN AEROBIC: CPT | Mod: ORL | Performed by: ORTHOPAEDIC SURGERY

## 2023-08-16 PROCEDURE — 87075 CULTR BACTERIA EXCEPT BLOOD: CPT | Mod: ORL | Performed by: ORTHOPAEDIC SURGERY

## 2023-08-18 LAB — BACTERIA SPEC CULT: NO GROWTH

## 2023-08-23 LAB — BACTERIA SPEC CULT: NORMAL

## 2023-08-28 ENCOUNTER — OFFICE VISIT (OUTPATIENT)
Dept: CARDIOLOGY | Facility: CLINIC | Age: 53
End: 2023-08-28
Payer: COMMERCIAL

## 2023-08-28 VITALS
SYSTOLIC BLOOD PRESSURE: 142 MMHG | BODY MASS INDEX: 35.65 KG/M2 | HEIGHT: 72 IN | WEIGHT: 263.2 LBS | HEART RATE: 68 BPM | RESPIRATION RATE: 16 BRPM | DIASTOLIC BLOOD PRESSURE: 70 MMHG

## 2023-08-28 DIAGNOSIS — I25.83 CORONARY ARTERY DISEASE DUE TO LIPID RICH PLAQUE: ICD-10-CM

## 2023-08-28 DIAGNOSIS — I25.10 CORONARY ARTERY DISEASE DUE TO LIPID RICH PLAQUE: ICD-10-CM

## 2023-08-28 PROCEDURE — 99214 OFFICE O/P EST MOD 30 MIN: CPT | Performed by: INTERNAL MEDICINE

## 2023-08-28 NOTE — PATIENT INSTRUCTIONS
Nice to visit with you today.We talked about trying nitroglycerin before a more rigorous walking pursuit and let me know whether it makes a difference. I am going to send in a new prescription for diltiazem 240mg instead of the 180mg.Please monitor your blood pressure, pulse, dizzy symptoms and update me.Chioma is my nurse and her number is [phone]    We talked about [facility] vasospasm experts Dr Sellers,[phone],Dr Amir Lerman and let me know what you are able to schedule.    Dose of L arginine in a previous study was 3 grams 3 times a day  I will let you know what the pharmacy team writes back.

## 2023-08-28 NOTE — PROGRESS NOTES
HEART CARE ENCOUNTER CONSULTATON NOTE      [facility]  [phone]      Assessment/Recommendations   Assessment/Plan:  1.  Coronary artery disease with report of persistent exertional chest discomfort.  Per chart history from the outside record he has a history of coronary vasospasm.  In 2016 he reportedly underwent a coronary angiogram with a positive acetylcholine challenge.  He has been on a combination of amlodipine, diltiazem and isosorbide mononitrate.  We met July 2023.  He has been experiencing increasing symptoms of chest discomfort which prompted a coronary angiogram after our initial visit.  This demonstrated no significant obstructive coronary artery disease into the report below..  At the time of our visit I recommended that we increase the isosorbide mononitrate to 120 mg daily.  This did not make much difference in his symptoms.  We talked about next steps.  I suggest that we explore having him reevaluated in the coronary vasospasm clinic at [place] and have given him some names and phone numbers which he will explore.  In addition we talked about trying nitroglycerin sublingual for a longer walking exercise pursuit and to update me with whether this has any benefit.  He does not experience dizziness with nitroglycerin historically.  We also discussed L-arginine.  I did write to my pharmacy team about preparation and dose.  The literature review that I was able to do suggested striving for 3 g 3 times daily.  I will update him once I hear back from the pharmacy team.  We also discussed whether we would increase diltiazem to 240 mg daily pending additional response from my pharmacy team.    2.  Risk modification.  We discussed nicotine cessation.  He does not smoke but does use nicotine lozenges.  Lipids from July 2023 finds a total cholesterol 102 with an LDL of 52.  He is on 40 mg of atorvastatin.  His blood pressure was mildly elevated upon arrival.  I have asked him to get me  some blood pressure and pulse readings.  We talked about the benefits of ongoing exercise pursuits, prudent diet and exercise      Plan 1.  As outlined above with additional decisions of pending return correspondence from the pharmacy team.  2.  Referral to the [facility] coronary vasospasm clinic.  I gave him some phone numbers to try             History of Present Illness/Subjective    HPI: Kemal Rodriguez is a 53 year old male who is seen in follow up.Patient had a coronary angiogram in 2016 that demonstrated symptomatic coronary artery spasm with acetylcholine injection.  There is no significant obstructive disease reported at that time.  The most recent note is from August 2022.  He has received medical therapy for chest discomfort.  He has been utilizing chewing tobacco at that time.  Chart records indicate that he has been on a combination of amlodipine and diltiazem.  He was started on Imdur August 2022 as he was no longer taking Cialis.  We met July 2023 and for progressive angina he underwent recent coronary angiogram.  This was completed July 2023 and reported minimal coronary artery disease.  Echocardiogram in July reported normal systolic function.  He increased the isosorbide mononitrate to 120 mg daily.  Unfortunately he has not had any improvement in his symptoms and continues to report a burning chest discomfort after walking 100 feet which resolves within 1 to 2 minutes of rest.  He does not have symptoms at rest.  We talked about this in detail with the recommendations discussed above.  I did ask him to get some additional blood pressure and pulse readings for me.       Recent Echocardiogram Results:  chocardiogram Complete  Order: [id number]  Status: Edited Result - FINAL       Visible to patient: No (inaccessible in MyChart)       Next appt: 08/28/2023 at 09:50 AM in Cardiology (Meek Morel MD)       Dx: Coronary artery disease due to lipid ...    2 Result Notes  Details    Reading  Physician Reading Date Result Priority   Bandar Glover MD  [phone] 2023      Narrative & Impression  [id number]  formerly [facility]  [id number]  [id number]^TIMOTEO^RAMEZ^[place] [address]     Name: DELGADO DARDEN  MRN: [id number]  : 1970  Study Date: 2023 07:26 AM  Age: 53 yrs  Gender: Male  Patient Location: [facility]  Reason For Study: Coronary artery disease due to lipid rich plaque, Coronary  arter  Ordering Physician: RAMEZ MAHMOOD  Referring Physician: RAMEZ MAHMOOD  Performed By: BP     BSA: 2.4 m2  Height: 72 in  Weight: 260 lb  HR: 69  BP: 142/77 mmHg  ______________________________________________________________________________  Procedure  Complete Portable Echo Adult. Definity (NDC #79181-561) given intravenously.  ______________________________________________________________________________  Interpretation Summary     The left ventricle is normal in size.  There is mild concentric left ventricular hypertrophy.  The visual ejection fraction is 65-70%.  No regional wall motion abnormalities noted.  Normal right ventricle size and systolic function.  The right ventricular systolic pressure is approximated at 27mmHg plus the  right atrial pressure.  IVC diameter <2.1 cm collapsing >50% with sniff suggests a normal RA pressure  of 3 mmHg.  There is no comparison study available.  ______________________________________________________________________________  Left Ventricle  The left ventricle is normal in size. There is mild concentric left  ventricular hypertrophy. Diastolic Doppler findings (E/E' ratio and/or other  parameters) suggest left ventricular filling pressures are indeterminate. The  visual ejection fraction is 65-70%. No regional wall motion abnormalities  noted.     Right Ventricle  Normal right ventricle size and systolic function. TAPSE is normal, which is  consistent with normal right ventricular systolic function.      Atria  The left atrium is mildly dilated. Right atrial size is normal.     Mitral Valve  The mitral valve leaflets appear thickened, but open well. There is trace  mitral regurgitation. There is no mitral valve stenosis.     Tricuspid Valve  Tricuspid valve leaflets appear normal. There is mild (1+) tricuspid  regurgitation. The right ventricular systolic pressure is approximated at  27mmHg plus the right atrial pressure. There is no tricuspid stenosis.     Aortic Valve  There is trivial trileaflet aortic sclerosis. No aortic regurgitation is  present. No aortic stenosis is present.     Pulmonic Valve  The pulmonic valve is not well visualized. There is no pulmonic valvular  regurgitation. There is no pulmonic valvular stenosis.     Vessels  IVC diameter <2.1 cm collapsing >50% with sniff suggests a normal RA pressure  of 3 mmHg.     Pericardium  There is no pericardial effusion.     Rhythm  Sinus rhythm was noted.       Recent Coronary Angiogram Results:    Other chest pain [R07.89 (ICD-10-CM)]   Coronary artery disease due to lipid rich plaque [I25.10, I25.83 (ICD-10-CM)]     Comments/Patient Narrative    53-year-old gentleman with history of coronary vasospasm and progressive chest pain who is referred for repeat coronary angiography.     Pre Procedure Diagnosis    worsening anginapre-operative evaluation       Conclusion    Minimal coronary artery disease         Plan     Follow bedrest per protocol   Continued medical management and lifestyle modifications for cardiovascular risk factor optimizations.      inus rhythm with 1st degree A-V block   Right bundle branch block   Abnormal ECG   When compared with ECG of 14-JAN-2021 13:27,   Right bundle branch block is now Present   Confirmed by CHRISTINE MARTINEZ MD LOC:JN ([id number]) on 7/17/2023 4:21:04 PM         Physical Examination  Review of Systems   Vitals: 142/70, repeat 134/68, weight 263 pounds, heart rate of 68 and regular  Wt Readings from Last 3  Encounters:   07/17/23 117.9 kg (260 lb)   07/10/23 118.8 kg (262 lb)   01/14/21 113.5 kg (250 lb 4.8 oz)       General Appearance:   no distress, normal body habitus   ENT/Mouth: membranes moist, no oral lesions or bleeding gums.      EYES:  no scleral icterus, normal conjunctivae   Neck: no carotid bruits    Chest/Lungs:   lungs are clear to auscultation, no rales or wheezing, , equal chest wall expansion    Cardiovascular:   Regular. Normal first and second heart sounds with no murmurs, rubs, or gallops; the carotid, radial and posterior tibial pulses are intact, Jugular venous pressure within normal limits, no significant edema bilaterally    Abdomen:  no bruits, or tenderness; bowel sounds are present   Extremities: no cyanosis or clubbing, soft cast on the right arm.   Skin: no xanthelasma, warm.    Neurologic: no tremors     Psychiatric: alert and oriented x3, calm        Please refer above for cardiac ROS details.        Medical History  Surgical History Family History Social History   Past Medical History:   Diagnosis Date    BPH (benign prostatic hyperplasia)     G6PD deficiency anemia (H)     History of blood transfusion     Hypertension     Sleep apnea     uses cpap      Past Surgical History:   Procedure Laterality Date    CV CORONARY ANGIOGRAM N/A 7/17/2023    Procedure: Coronary Angiogram;  Surgeon: Gustavo Sultana MD;  Location: [facility] CV    CV LEFT HEART CATH N/A 7/17/2023    Procedure: Left Heart Catheterization;  Surgeon: Gustavo Sultana MD;  Location: [facility]    HERNIA REPAIR      x3 on R inguinal  side    LAPAROSCOPIC CHOLECYSTECTOMY N/A 12/18/2019    Procedure: CHOLECYSTECTOMY, LAPAROSCOPIC;  Surgeon: Therese Crow MD;  Location: [facility] OR    REPLACEMENT TOTAL KNEE Bilateral      No family history on file.     Social History     Socioeconomic History    Marital status:      Spouse name: Not on file    Number of children: Not on file    Years of education: Not  on file    Highest education level: Not on file   Occupational History    Not on file   Tobacco Use    Smoking status: Unknown    Smokeless tobacco: Former     Types: Chew    Tobacco comments:     2 tins/day   Substance and Sexual Activity    Alcohol use: Never    Drug use: Never    Sexual activity: Not on file   Other Topics Concern    Parent/sibling w/ CABG, MI or angioplasty before 65F 55M? Not Asked   Social History Narrative    Not on file     Social Determinants of Health     Financial Resource Strain: Not on file   Food Insecurity: Not on file   Transportation Needs: Not on file   Physical Activity: Not on file   Stress: Not on file   Social Connections: Not on file   Intimate Partner Violence: Not on file   Housing Stability: Not on file           Medications  Allergies   Current Outpatient Medications   Medication Sig Dispense Refill    amLODIPine (NORVASC) 10 MG tablet Take 10 mg by mouth daily      aspirin 81 MG EC tablet Take 81 mg by mouth At Bedtime      atorvastatin (LIPITOR) 40 MG tablet Take 40 mg by mouth At Bedtime       buPROPion (WELLBUTRIN XL) 300 MG 24 hr tablet Take 300 mg by mouth every morning      cyclobenzaprine (FLEXERIL) 10 MG tablet Take 10 mg by mouth 3 times daily as needed      diltiazem ER (DILT-XR) 180 MG 24 hr capsule Take 180 mg by mouth daily      isosorbide mononitrate (IMDUR) 30 MG 24 hr tablet Take 4 tablets (120 mg) by mouth daily 300 tablet 4    Melatonin 10 MG TABS tablet Take 10 mg by mouth nightly as needed for sleep      Multiple Vitamin (MULTI-VITAMINS) TABS Take 1 tablet by mouth daily       nicotine (NICORETTE) 4 MG lozenge Place 4 mg inside cheek every hour as needed      nitroGLYcerin (NITROSTAT) 0.4 MG sublingual tablet For chest pain place 1 tablet under the tongue every 5 minutes for 3 doses. If symptoms persist 5 minutes after 1st dose call 911. 25 tablet 4    omega 3 1000 MG CAPS Take 1 g by mouth 2 times daily       tamsulosin (FLOMAX) 0.4 MG capsule Take  0.4 mg by mouth daily      vitamin C (ASCORBIC ACID) 500 MG tablet Take 500 mg by mouth daily      vitamin D3 (CHOLECALCIFEROL) 50 mcg (2000 units) tablet Take 1 tablet by mouth daily      XIIDRA 5 % opthalmic solution Place 1 drop into both eyes 2 times daily         Allergies   Allergen Reactions    Quinine GI Disturbance and Nausea and Vomiting     Nausea & Vomiting      Aspirin      Other reaction(s): Other (see comments)  PN: LW Reaction: Blood disorder  PN: LW Reaction: Blood disorder      Pcn [Penicillins]     Sulfa Antibiotics           Lab Results    Chemistry/lipid CBC Cardiac Enzymes/BNP/TSH/INR   Recent Labs   Lab Test 07/17/23  0655   CHOL 102   HDL 29*   LDL 52   TRIG 106     Recent Labs   Lab Test 07/17/23  0655 01/14/21  0505   LDL 52 79     Recent Labs   Lab Test 07/11/23  1024      POTASSIUM 3.8   CHLORIDE 105   CO2 27      BUN 13   CR 0.92   GFRESTIMATED >90   DALE 9.3     Recent Labs   Lab Test 07/11/23  1024 01/13/21  2100 12/18/19  0620   CR 0.92 0.72 0.77     No results for input(s): A1C in the last 01057 hours.       Recent Labs   Lab Test 07/11/23  1024   WBC 8.9   HGB 12.3*   HCT 37.8*   *        Recent Labs   Lab Test 07/11/23  1024 01/14/21  0515 01/13/21  2100   HGB 12.3* 11.1* 11.3*    No results for input(s): TROPONINI in the last 94472 hours.  No results for input(s): BNP, NTBNPI, NTBNP in the last 43791 hours.  Recent Labs   Lab Test 01/14/21  0505   TSH 3.07     Recent Labs   Lab Test 01/13/21  2100   INR 1.04        Meek Morel MD

## 2023-08-28 NOTE — LETTER
8/28/2023    Juanita Sorenson, CNP  [address] [facility] [address]    RE: Kemal Rodriguez       Dear Colleague,     I had the [facility].    HEART CARE ENCOUNTER CONSULTATON NOTE      [facility]  [phone]      Assessment/Recommendations   Assessment/Plan:  1.  Coronary artery disease with report of persistent exertional chest discomfort.  Per chart history from the outside record he has a history of coronary vasospasm.  In 2016 he reportedly underwent a coronary angiogram with a positive acetylcholine challenge.  He has been on a combination of amlodipine, diltiazem and isosorbide mononitrate.  We met July 2023.  He has been experiencing increasing symptoms of chest discomfort which prompted a coronary angiogram after our initial visit.  This demonstrated no significant obstructive coronary artery disease into the report below..  At the time of our visit I recommended that we increase the isosorbide mononitrate to 120 mg daily.  This did not make much difference in his symptoms.  We talked about next steps.  I suggest that we explore having him reevaluated in the coronary vasospasm clinic at [place] and have given him some names and phone numbers which he will explore.  In addition we talked about trying nitroglycerin sublingual for a longer walking exercise pursuit and to update me with whether this has any benefit.  He does not experience dizziness with nitroglycerin historically.  We also discussed L-arginine.  I did write to my pharmacy team about preparation and dose.  The literature review that I was able to do suggested striving for 3 g 3 times daily.  I will update him once I hear back from the pharmacy team.  We also discussed whether we would increase diltiazem to 240 mg daily pending additional response from my pharmacy team.    2.  Risk modification.  We discussed nicotine cessation.  He does not smoke but  does use nicotine lozenges.  Lipids from July 2023 finds a total cholesterol 102 with an LDL of 52.  He is on 40 mg of atorvastatin.  His blood pressure was mildly elevated upon arrival.  I have asked him to get me some blood pressure and pulse readings.  We talked about the benefits of ongoing exercise pursuits, prudent diet and exercise      Plan 1.  As outlined above with additional decisions of pending return correspondence from the pharmacy team.  2.  Referral to the [facility] coronary vasospasm clinic.  I gave him some phone numbers to try             History of Present Illness/Subjective    HPI: Kemal Rodriguez is a 53 year old male who is seen in follow up.Patient had a coronary angiogram in 2016 that demonstrated symptomatic coronary artery spasm with acetylcholine injection.  There is no significant obstructive disease reported at that time.  The most recent note is from August 2022.  He has received medical therapy for chest discomfort.  He has been utilizing chewing tobacco at that time.  Chart records indicate that he has been on a combination of amlodipine and diltiazem.  He was started on Imdur August 2022 as he was no longer taking Cialis.  We met July 2023 and for progressive angina he underwent recent coronary angiogram.  This was completed July 2023 and reported minimal coronary artery disease.  Echocardiogram in July reported normal systolic function.  He increased the isosorbide mononitrate to 120 mg daily.  Unfortunately he has not had any improvement in his symptoms and continues to report a burning chest discomfort after walking 100 feet which resolves within 1 to 2 minutes of rest.  He does not have symptoms at rest.  We talked about this in detail with the recommendations discussed above.  I did ask him to get some additional blood pressure and pulse readings for me.       Recent Echocardiogram Results:  chocardiogram Complete  Order: [id number]  Status: Edited Result - FINAL        Visible to patient: No (inaccessible in MyChart)       Next appt: 2023 at 09:50 AM in Cardiology (Meek Morel MD)       Dx: Coronary artery disease due to lipid ...    2 Result Notes  Details    Reading Physician Reading Date Result Priority   Bandar Glover MD  [phone] 2023      Narrative & Impression  [id number]  [id number]  [id number]  [id number] [address]     Name: DELGADO DARDEN  MRN: [id number]  : 1970  Study Date: 2023 07:26 AM  Age: 53 yrs  Gender: Male  Patient [facility]: [facility]  Reason For Study: Coronary artery disease due to lipid rich plaque, Coronary  arter  Ordering Physician: MEEK MOREL  Referring Physician: MEEK MOREL  Performed By: BP     BSA: 2.4 m2  Height: 72 in  Weight: 260 lb  HR: 69  BP: 142/77 mmHg  ______________________________________________________________________________  Procedure  Complete Portable Echo Adult. Definity (NDC #93544-480) given intravenously.  ______________________________________________________________________________  Interpretation Summary     The left ventricle is normal in size.  There is mild concentric left ventricular hypertrophy.  The visual ejection fraction is 65-70%.  No regional wall motion abnormalities noted.  Normal right ventricle size and systolic function.  The right ventricular systolic pressure is approximated at 27mmHg plus the  right atrial pressure.  IVC diameter <2.1 cm collapsing >50% with sniff suggests a normal RA pressure  of 3 mmHg.  There is no comparison study available.  ______________________________________________________________________________  Left Ventricle  The left ventricle is normal in size. There is mild concentric left  ventricular hypertrophy. Diastolic Doppler findings (E/E' ratio and/or other  parameters) suggest left ventricular filling pressures are indeterminate. The  visual ejection fraction is 65-70%. No  regional wall motion abnormalities  noted.     Right Ventricle  Normal right ventricle size and systolic function. TAPSE is normal, which is  consistent with normal right ventricular systolic function.     Atria  The left atrium is mildly dilated. Right atrial size is normal.     Mitral Valve  The mitral valve leaflets appear thickened, but open well. There is trace  mitral regurgitation. There is no mitral valve stenosis.     Tricuspid Valve  Tricuspid valve leaflets appear normal. There is mild (1+) tricuspid  regurgitation. The right ventricular systolic pressure is approximated at  27mmHg plus the right atrial pressure. There is no tricuspid stenosis.     Aortic Valve  There is trivial trileaflet aortic sclerosis. No aortic regurgitation is  present. No aortic stenosis is present.     Pulmonic Valve  The pulmonic valve is not well visualized. There is no pulmonic valvular  regurgitation. There is no pulmonic valvular stenosis.     Vessels  IVC diameter <2.1 cm collapsing >50% with sniff suggests a normal RA pressure  of 3 mmHg.     Pericardium  There is no pericardial effusion.     Rhythm  Sinus rhythm was noted.       Recent Coronary Angiogram Results:    Other chest pain [R07.89 (ICD-10-CM)]   Coronary artery disease due to lipid rich plaque [I25.10, I25.83 (ICD-10-CM)]     Comments/Patient Narrative    53-year-old gentleman with history of coronary vasospasm and progressive chest pain who is referred for repeat coronary angiography.     Pre Procedure Diagnosis    worsening anginapre-operative evaluation       Conclusion    Minimal coronary artery disease         Plan     Follow bedrest per protocol   Continued medical management and lifestyle modifications for cardiovascular risk factor optimizations.      inus rhythm with 1st degree A-V block   Right bundle branch block   Abnormal ECG   When compared with ECG of 14-JAN-2021 13:27,   Right bundle branch block is now Present   Confirmed by JUAN SMALL,  CHRISTINE LOC:JN ([id number]) on 7/17/2023 4:21:04 PM         Physical Examination  Review of Systems   Vitals: 142/70, repeat 134/68, weight 263 pounds, heart rate of 68 and regular  Wt Readings from Last 3 Encounters:   07/17/23 117.9 kg (260 lb)   07/10/23 118.8 kg (262 lb)   01/14/21 113.5 kg (250 lb 4.8 oz)       General Appearance:   no distress, normal body habitus   ENT/Mouth: membranes moist, no oral lesions or bleeding gums.      EYES:  no scleral icterus, normal conjunctivae   Neck: no carotid bruits    Chest/Lungs:   lungs are clear to auscultation, no rales or wheezing, , equal chest wall expansion    Cardiovascular:   Regular. Normal first and second heart sounds with no murmurs, rubs, or gallops; the carotid, radial and posterior tibial pulses are intact, Jugular venous pressure within normal limits, no significant edema bilaterally    Abdomen:  no bruits, or tenderness; bowel sounds are present   Extremities: no cyanosis or clubbing, soft cast on the right arm.   Skin: no xanthelasma, warm.    Neurologic: no tremors     Psychiatric: alert and oriented x3, calm        Please refer above for cardiac ROS details.        Medical History  Surgical History Family History Social History   Past Medical History:   Diagnosis Date    BPH (benign prostatic hyperplasia)     G6PD deficiency anemia (H)     History of blood transfusion     Hypertension     Sleep apnea     uses cpap      Past Surgical History:   Procedure Laterality Date    CV CORONARY ANGIOGRAM N/A 7/17/2023    Procedure: Coronary Angiogram;  Surgeon: Gustavo Sultana MD;  [facility]    CV LEFT HEART CATH N/A 7/17/2023    Procedure: Left Heart Catheterization;  Surgeon: Gustavo Sultana MD;  Location: [facility] CV    HERNIA REPAIR      x3 on R inguinal  side    LAPAROSCOPIC CHOLECYSTECTOMY N/A 12/18/2019    Procedure: CHOLECYSTECTOMY, LAPAROSCOPIC;  Surgeon: Therese Crow MD;  Location: [facility] OR    REPLACEMENT TOTAL KNEE  Bilateral      No family history on file.     Social History     Socioeconomic History    Marital status:      Spouse name: Not on file    Number of children: Not on file    Years of education: Not on file    Highest education level: Not on file   Occupational History    Not on file   Tobacco Use    Smoking status: Unknown    Smokeless tobacco: Former     Types: Chew    Tobacco comments:     2 tins/day   Substance and Sexual Activity    Alcohol use: Never    Drug use: Never    Sexual activity: Not on file   Other Topics Concern    Parent/sibling w/ CABG, MI or angioplasty before 65F 55M? Not Asked   Social History Narrative    Not on file     Social Determinants of Health     Financial Resource Strain: Not on file   Food Insecurity: Not on file   Transportation Needs: Not on file   Physical Activity: Not on file   Stress: Not on file   Social Connections: Not on file   Intimate Partner Violence: Not on file   Housing Stability: Not on file           Medications  Allergies   Current Outpatient Medications   Medication Sig Dispense Refill    amLODIPine (NORVASC) 10 MG tablet Take 10 mg by mouth daily      aspirin 81 MG EC tablet Take 81 mg by mouth At Bedtime      atorvastatin (LIPITOR) 40 MG tablet Take 40 mg by mouth At Bedtime       buPROPion (WELLBUTRIN XL) 300 MG 24 hr tablet Take 300 mg by mouth every morning      cyclobenzaprine (FLEXERIL) 10 MG tablet Take 10 mg by mouth 3 times daily as needed      diltiazem ER (DILT-XR) 180 MG 24 hr capsule Take 180 mg by mouth daily      isosorbide mononitrate (IMDUR) 30 MG 24 hr tablet Take 4 tablets (120 mg) by mouth daily 300 tablet 4    Melatonin 10 MG TABS tablet Take 10 mg by mouth nightly as needed for sleep      Multiple Vitamin (MULTI-VITAMINS) TABS Take 1 tablet by mouth daily       nicotine (NICORETTE) 4 MG lozenge Place 4 mg inside cheek every hour as needed      nitroGLYcerin (NITROSTAT) 0.4 MG sublingual tablet For chest pain place 1 tablet under the  tongue every 5 minutes for 3 doses. If symptoms persist 5 minutes after 1st dose call 911. 25 tablet 4    omega 3 1000 MG CAPS Take 1 g by mouth 2 times daily       tamsulosin (FLOMAX) 0.4 MG capsule Take 0.4 mg by mouth daily      vitamin C (ASCORBIC ACID) 500 MG tablet Take 500 mg by mouth daily      vitamin D3 (CHOLECALCIFEROL) 50 mcg (2000 units) tablet Take 1 tablet by mouth daily      XIIDRA 5 % opthalmic solution Place 1 drop into both eyes 2 times daily         Allergies   Allergen Reactions    Quinine GI Disturbance and Nausea and Vomiting     Nausea & Vomiting      Aspirin      Other reaction(s): Other (see comments)  PN: LW Reaction: Blood disorder  PN: LW Reaction: Blood disorder      Pcn [Penicillins]     Sulfa Antibiotics           Lab Results    Chemistry/lipid CBC Cardiac Enzymes/BNP/TSH/INR   Recent Labs   Lab Test 07/17/23  0655   CHOL 102   HDL 29*   LDL 52   TRIG 106     Recent Labs   Lab Test 07/17/23  0655 01/14/21  0505   LDL 52 79     Recent Labs   Lab Test 07/11/23  1024      POTASSIUM 3.8   CHLORIDE 105   CO2 27      BUN 13   CR 0.92   GFRESTIMATED >90   DALE 9.3     Recent Labs   Lab Test 07/11/23  1024 01/13/21  2100 12/18/19  0620   CR 0.92 0.72 0.77     No results for input(s): A1C in the last 29503 hours.       Recent Labs   Lab Test 07/11/23  1024   WBC 8.9   HGB 12.3*   HCT 37.8*   *        Recent Labs   Lab Test 07/11/23  1024 01/14/21  0515 01/13/21  2100   HGB 12.3* 11.1* 11.3*    No results for input(s): TROPONINI in the last 24019 hours.  No results for input(s): BNP, NTBNPI, NTBNP in the last 78773 hours.  Recent Labs   Lab Test 01/14/21  0505   TSH 3.07     Recent Labs   Lab Test 01/13/21  2100   INR 1.04        Meek Morel MD      Thank you for allowing me to participate in the care of your patient.      Sincerely,     Meek Morel MD     [facility]  cc:   Meek Morel MD  [address]  [facility]  [address]  [place] [address]

## 2023-09-13 ENCOUNTER — TELEPHONE (OUTPATIENT)
Dept: CARDIOLOGY | Facility: CLINIC | Age: 53
End: 2023-09-13
Payer: COMMERCIAL

## 2023-09-13 VITALS — SYSTOLIC BLOOD PRESSURE: 134 MMHG | DIASTOLIC BLOOD PRESSURE: 68 MMHG

## 2023-09-13 NOTE — TELEPHONE ENCOUNTER
[place] Community Measures Blood Pressure guideline reviewed.  At office visit on 8/28/23  Dr. Morel rechecked patients BP and it was 134/68.

## 2023-09-15 ENCOUNTER — TELEPHONE (OUTPATIENT)
Dept: CARDIOLOGY | Facility: CLINIC | Age: 53
End: 2023-09-15

## 2023-09-15 NOTE — TELEPHONE ENCOUNTER
[facility] to call for recommendations.  -[facility]    ----- Message from Meek Morel MD sent at 9/12/2023  1:09 PM CDT -----  Regarding: RE: G6PD and L-arginine  I have not been able to connect. Bottom line ok to use l arginine 3 grams 3 times a day. It s I ve the counter. Would like him seem at the [facility] in their clinic that focuses on microvascular dz and I gave him a name and number.    ----- Message -----  From: Meek Morel MD  Sent: 9/12/2023  10:47 AM CDT  To: Chioma Huggins RN  Subject: FW: G6PD and L-arginine                          Hi Dr. Morel,    I was forwarded your message regarding l-arginine and G6PD deficiency.    I checked updated G6PD interaction guidelines as well a primary lit search. I can't find any evidence to support an interaction between the two. I did see the [place] note from 2016 where it states the interaction is low risk- as far as I can tell, this possibly came from a G6PD website where it is listed but does not have any references or citations to support it and the website is no longer updated.    I would consider using l-arginine at normal doses, assuming no to low risk, and monitor for reactions such as hemolysis and hyperbilirubinemia.    Best,    Marely Saucedo, PharmD, [facility]  Pharmacy      ----- Message -----  From: Sandi Palma RPH  Sent: 9/7/2023  12:00 AM CDT  To: YORDY Delarosa,     I can't tell if you reply back to Dr. Morel regarding his G6pd def and L-arginine question? This was originally a pool question that I answered so not a provider I work with. I didn't have a chance to look into this today so if you didn't reply back to him could you get back to him or forward on to those covering the pool. I am out the rest of the week. Thanks!    Bethany    ----- Message -----  From: Daphnie Gray RPH  Sent: 9/6/2023  12:00 AM CDT  To: Sandi Palma RPH    Can we please let him know the response from pharmacy, ok to initiate l  arginine 3grams 3 times a day, he was going to arrange an appointment with [place] , was he able to arrange?  Mdg    ----- Message -----  From: Meek Morel MD  Sent: 8/31/2023   8:29 PM CDT  To: Sandi Palma [facility]    Thank you, I am trying to get him into the [place] coronary physiology clinic, Chioma can we see if he had any luck, please tell him l arginine dosing is reasonable, L- Arginine does come in 1000 mg tablet (highest strength I could find) so you could have him take 3 tablets three times daily to get your suggested doseand see how he does.Lets hold on making diltiazem or amlodipine, we could consider losartan but to my knowledge not a lot of data with coronary vasospasm.  Sandi what do you think about the G6pd deficiency and l arginine? Thanks, HOMAR Morel    ----- Message -----  From: Sandi Palma RPH  Sent: 8/28/2023   1:33 PM CDT  To: MD Dr. Adriel Vargas,      I did find that L-arginine can be used at doses of 1.5 to 24 grams daily for up to 18 months so your dosing is appropriate. I found this can possibly be effective for angina. Oral L-arginine seems to improve symptoms and exercise tolerance in patients with angina. Details: Clinical research shows that taking L-arginine orally seems to decrease symptoms and improve exercise tolerance and quality of life in patients with mild to severe angina. L- Arginine does come in 1000 mg tablet (highest strength I could find) so you could have him take 3 tablets three times daily to get your suggested dose.      Regarding combining amlodipine and diltiazem. They can be used together but there is a drug interaction. Taking these together can increase the effects of amlodipine so increasing the diltiazem could also increase the concentration of amlodipine. He is on max dose so could have side effects so may want to consider decreasing amlodipine. From what I can tell looking through his chart he has not been on an ARB like losartan. His renal  "function is normal and potassium on lower end so if his blood pressure is not controlled with med changes could consider adding an ARB or have the ARB take he place of amlodipine to keep pill burden down.  I did find this in Care Everywhere from [place] encounter on 8/1/2016 - \"In the future, would consider stopping his atenolol and starting L-arginine and/or considering an ACE inhibitor. Please note he does have G6PD deficiency and arginine appears on one of the lists for drugs that carry associated low risk. We have to think about this carefully in the future. If he is doing poorly with attempts at up titrating his medical therapy as well as lifestyle changes, then we could have him seen in [place] in the Chest Pain and Coronary Physiology Clinic.\"    Please reach out with any further questions.    Bethany Palma PharmD  Medication Therap    ----- Message -----  From: Marely Saucedo [facility]  Sent: 9/7/2023  11:06 AM CDT  To: Meek Morel MD  Subject: G6PD and L-arginine                              Hi Dr. Morel,     I was forwarded your message regarding l-arginine and G6PD deficiency.     I checked updated G6PD interaction guidelines as well a primary lit search. I can't find any evidence to support an interaction between the two. I did see the [place] note from 2016 where it states the interaction is low risk- as far as I can tell, this possibly came from a G6PD website where it is listed but does not have any references or citations to support it and the website is no longer updated.     I would consider using l-arginine at normal doses, assuming no to low risk, and monitor for reactions such as hemolysis and hyperbilirubinemia.     Marely Alejandra PharmD, [facility]   Pharmacy      ----- Message -----  From: Sandi Palma [facility]  Sent: 9/7/2023  12:00 AM CDT  To: Daphnie Gray [facility]    Eric Eller,     I can't tell if you reply back to Dr. Morel regarding his G6pd def and L-arginine question? " This was originally a pool question that I answered so not a provider I work with. I didn't have a chance to look into this today so if you didn't reply back to him could you get back to him or forward on to those covering the pool. I am out the rest of the week. Thanks!    Bethany    --- Message -----  From: Meek Morel MD  Sent: 8/31/2023   8:29 PM CDT  To: Sandi Palma [facility]    Thank you, I am trying to get him into the [place] coronary physiology clinic, Chioma can we see if he had any luck, please tell him l arginine dosing is reasonable, L- Arginine does come in 1000 mg tablet (highest strength I could find) so you could have him take 3 tablets three times daily to get your suggested doseand see how he does.Lets hold on making diltiazem or amlodipine, we could consider losartan but to my knowledge not a lot of data with coronary vasospasm.  Sandi what do you think about the G6pd deficiency and l arginine? Thanks, HOMAR Morel    ----- Message -----  From: Sandi Palma RPH  Sent: 8/28/2023   1:33 PM CDT  To: MD Dr. Adriel Vargas,      I did find that L-arginine can be used at doses of 1.5 to 24 grams daily for up to 18 months so your dosing is appropriate. I found this can possibly be effective for angina. Oral L-arginine seems to improve symptoms and exercise tolerance in patients with angina. Details: Clinical research shows that taking L-arginine orally seems to decrease symptoms and improve exercise tolerance and quality of life in patients with mild to severe angina. L- Arginine does come in 1000 mg tablet (highest strength I could find) so you could have him take 3 tablets three times daily to get your suggested dose.      Regarding combining amlodipine and diltiazem. They can be used together but there is a drug interaction. Taking these together can increase the effects of amlodipine so increasing the diltiazem could also increase the concentration of amlodipine. He is on max dose so  "could have side effects so may want to consider decreasing amlodipine. From what I can tell looking through his chart he has not been on an ARB like losartan. His renal function is normal and potassium on lower end so if his blood pressure is not controlled with med changes could consider adding an ARB or have the ARB take he place of amlodipine to keep pill burden down.  I did find this in Care Everywhere from [place] encounter on 8/1/2016 - \"In the future, would consider stopping his atenolol and starting L-arginine and/or considering an ACE inhibitor. Please note he does have G6PD deficiency and arginine appears on one of the lists for drugs that carry associated low risk. We have to think about this carefully in the future. If he is doing poorly with attempts at up titrating his medical therapy as well as lifestyle changes, then we could have him seen in [place] in the Chest Pain and Coronary Physiology Clinic.\"    Please reach out with any further questions.    Bethany Palma, PharmD  Medication Therapy Management Pharmacist    ----- Message -----  From: Meek Morel MD  Sent: 8/28/2023  10:14 AM CDT  To: [facility] Pharmacist Consult    Hi  Patient with a history of coronary  vasospasm per [facility] 2016, on amlodipine 10mg daily, diltiazem 180mg daily, imdur 120mg daily.Stll having limiting symptoms. Hoping for your thoughts on l arginine, literature I read is 3grams three times a day. Also your thoughts on the combination of amlodipine and diltiazem which he has been on for a long time and I am considering pushing the dilt to 240mg. Thoughts? Thanks, HOMAR Morel                    "

## 2023-09-27 NOTE — TELEPHONE ENCOUNTER
Recommendations discussed with pt.  Pt verbalized understanding and had no questions.  Pt said he has reached out to [place] twice and has not heard back.      L-arginine added to med list 3 g tid.

## 2023-10-09 NOTE — TELEPHONE ENCOUNTER
Pt said he has heard from [place] - they will not even schedule him because he has an outstanding surgical bill from years ago.    Pt has been taking his BP - has consistently been 138-148/75, HR 74-76.    He had an episode of dizziness after first dose of arginine, but it hasn't happened since.    He was expecting dose increase of diltiazem to 240 mg daily but said pharm never got rx.    Dr Morel - any new recommendations?  Did you want diltiazem increased?  -tyler

## 2023-10-11 NOTE — TELEPHONE ENCOUNTER
LVM for pt to call with updates regarding chest discomfort.  -yvan    ----- Message -----  From: Meek Morel MD  Sent: 10/11/2023   3:03 PM CDT  To: Chioma Huggins RN    I cannot tell if this went through.  I wanted to know if the L-arginine was helping his chest discomfort and make decisions about the diltiazem.

## 2023-12-12 ENCOUNTER — OFFICE VISIT (OUTPATIENT)
Dept: CARDIOLOGY | Facility: CLINIC | Age: 53
End: 2023-12-12
Payer: COMMERCIAL

## 2023-12-12 VITALS
HEART RATE: 76 BPM | RESPIRATION RATE: 14 BRPM | SYSTOLIC BLOOD PRESSURE: 120 MMHG | BODY MASS INDEX: 36.08 KG/M2 | DIASTOLIC BLOOD PRESSURE: 68 MMHG | WEIGHT: 266 LBS

## 2023-12-12 DIAGNOSIS — I10 ESSENTIAL HYPERTENSION: ICD-10-CM

## 2023-12-12 DIAGNOSIS — I25.111 CORONARY ARTERY DISEASE INVOLVING NATIVE CORONARY ARTERY OF NATIVE HEART WITH ANGINA PECTORIS WITH DOCUMENTED SPASM (H): Primary | ICD-10-CM

## 2023-12-12 DIAGNOSIS — F17.220 CHEWING TOBACCO NICOTINE DEPENDENCE WITHOUT COMPLICATION: ICD-10-CM

## 2023-12-12 PROCEDURE — 99214 OFFICE O/P EST MOD 30 MIN: CPT | Performed by: INTERNAL MEDICINE

## 2023-12-12 RX ORDER — DIAZEPAM 5 MG
5 TABLET ORAL
COMMUNITY
End: 2024-09-16

## 2023-12-12 RX ORDER — TRAZODONE HYDROCHLORIDE 50 MG/1
50 TABLET, FILM COATED ORAL
COMMUNITY
Start: 2023-11-08

## 2023-12-12 RX ORDER — RANOLAZINE 500 MG/1
500 TABLET, EXTENDED RELEASE ORAL 2 TIMES DAILY
Qty: 180 TABLET | Refills: 3 | Status: SHIPPED | OUTPATIENT
Start: 2023-12-12

## 2023-12-12 RX ORDER — ISOSORBIDE MONONITRATE 120 MG/1
120 TABLET, EXTENDED RELEASE ORAL DAILY
Qty: 90 TABLET | Refills: 3 | Status: SHIPPED | OUTPATIENT
Start: 2023-12-12

## 2023-12-12 RX ORDER — ALBUTEROL SULFATE 90 UG/1
2 AEROSOL, METERED RESPIRATORY (INHALATION) EVERY 6 HOURS
COMMUNITY
Start: 2023-10-11 | End: 2024-09-16

## 2023-12-12 NOTE — PROGRESS NOTES
[facility] HEART CARE   [address]  [place] [address]   [email or web address]   OFFICE: [phone]     CARDIOLOGY CLINIC NOTE     Thank you, Juanita Del Real, for asking the [facility] Heart Care team to see Mr. Kemal Rodriguez to Follow Up (angina)        Assessment/Recommendations   Assessment:    Coronary artery disease with documented vasospasm - continues with class III angina. Trial of L-arginine did not improve his anginal pain. Angina remains inadequately controlled with amlodipine, diltiazem and isosorbide mononitrate. Last dose increase of isosorbide did not improve symptoms.  Hypertension - controlled  Nicotine dependence - still intermittently uses chewing tobacco or nicotine lozenges.    Plan:  Start ranolazine 500 mg q12h   Continue other medications without changes though move diltiazem dosing to evening.  Encouraged attempts at aerobic conditioning and strength training.         History of Present Illness   Mr. Kemal Rodriguez is a 53 year old male with a significant past history of coronary artery disease with documented vasospasm who presents for follow-up.    Mr. Rodriguez was identified to have coronary artery disease with vasospasm as demonstrated by coronary angiogram and positive acetylcholine challenge. He most recently reported increased anginal chest pain refractory to isosorbide. He was advised a trial of L-arginine.    Today, Kemal continues to have exertional angina with a brisk walk. This is unchanged from 6 months ago. He did try L-arginine without any improvement in symptoms. He notes that at about 24 hours from taking all his antianginal medications at once his pain returns.      Other than noted above, Mr. Rodriguez denies any chest pain/pressure/tightness, shortness of breath at rest or with exertion, light headedness/dizziness, pre-syncope, syncope, lower extremity swelling, palpitations, paroxysmal nocturnal dyspnea  (PND), or orthopnea.     Cardiac Problems and Cardiac Diagnostics     Most Recent Cardiac testing:  ECG dated 7/17/23 (personaly reviewed and interpreted): sinus rhythm with first degree AV block and RBBB    ECHO (report reviewed):   TTE 7/17/23  The left ventricle is normal in size.  There is mild concentric left ventricular hypertrophy.  The visual ejection fraction is 65-70%.  No regional wall motion abnormalities noted.  Normal right ventricle size and systolic function.  The right ventricular systolic pressure is approximated at 27mmHg plus the  right atrial pressure.  IVC diameter <2.1 cm collapsing >50% with sniff suggests a normal RA pressure  of 3 mmHg.  There is no comparison study available.    Cardiac cath: from 7/17/23 demonstrated   Left Main   The vessel was visualized by selective angiography and is moderate in size. There was 0% vessel disease.      Left Anterior Descending   Mid LAD lesion is 15% stenosed.      Ramus Intermedius   The vessel was visualized by selective angiography and is large. There was 0% vessel disease.      Left Circumflex   The vessel was visualized by selective angiography and is moderate in size. There was 0% vessel disease.      Right Coronary Artery   Dist RCA lesion is 10% stenosed.             Medications  Allergies   Current Outpatient Medications   Medication Sig Dispense Refill    albuterol (PROAIR HFA/PROVENTIL HFA/VENTOLIN HFA) 108 (90 Base) MCG/ACT inhaler 2 puffs every 6 hours      amLODIPine (NORVASC) 10 MG tablet Take 10 mg by mouth daily      aspirin 81 MG EC tablet Take 81 mg by mouth At Bedtime      atorvastatin (LIPITOR) 40 MG tablet Take 40 mg by mouth At Bedtime       buPROPion (WELLBUTRIN XL) 300 MG 24 hr tablet Take 300 mg by mouth every morning      cyclobenzaprine (FLEXERIL) 10 MG tablet Take 10 mg by mouth 3 times daily as needed      diltiazem ER (DILT-XR) 180 MG 24 hr capsule Take 180 mg by mouth daily      isosorbide mononitrate (IMDUR) 120 MG 24  HR ER tablet Take 1 tablet (120 mg) by mouth daily 90 tablet 3    Melatonin 10 MG TABS tablet Take 10 mg by mouth nightly as needed for sleep      Multiple Vitamin (MULTI-VITAMINS) TABS Take 1 tablet by mouth daily       nicotine (NICORETTE) 4 MG lozenge Place 4 mg inside cheek every hour as needed      nitroGLYcerin (NITROSTAT) 0.4 MG sublingual tablet For chest pain place 1 tablet under the tongue every 5 minutes for 3 doses. If symptoms persist 5 minutes after 1st dose call 911. 25 tablet 4    omega 3 1000 MG CAPS Take 1 g by mouth 2 times daily       ranolazine (RANEXA) 500 MG 12 hr tablet Take 1 tablet (500 mg) by mouth 2 times daily 180 tablet 3    tamsulosin (FLOMAX) 0.4 MG capsule Take 0.4 mg by mouth daily      traZODone (DESYREL) 50 MG tablet Take 50 mg by mouth at bedtime      vitamin C (ASCORBIC ACID) 500 MG tablet Take 500 mg by mouth daily      vitamin D3 (CHOLECALCIFEROL) 50 mcg (2000 units) tablet Take 1 tablet by mouth daily      arginine 1000 MG tablet Take 3,000 mg by mouth 3 times daily (Patient not taking: Reported on 12/12/2023)      diazepam (VALIUM) 5 MG tablet Take 5 mg by mouth        Allergies   Allergen Reactions    Quinine GI Disturbance and Nausea and Vomiting     Nausea & Vomiting      Aspirin      PN: LW Reaction: Blood disorder      Pcn [Penicillins]     Sulfa Antibiotics         Physical Examination Review of Systems   Vitals: /68 (BP Location: Right arm, Patient Position: Sitting, Cuff Size: Adult Large)   Pulse 76   Resp 14   Wt 120.7 kg (266 lb)   BMI 36.08 kg/m    BMI= Body mass index is 36.08 kg/m .  Wt Readings from Last 3 Encounters:   12/12/23 120.7 kg (266 lb)   08/28/23 119.4 kg (263 lb 3.2 oz)   07/17/23 117.9 kg (260 lb)       General: pleasant male. No acute distress.  HENT: external ears normal. Nares patent. Mucous membranes moist.  Eyes: perrla, extraocular muscles intact. No scleral icterus.   Neck: No JVD  Lungs: clear to auscultation  COR: regular rate  and rhythm, No murmurs, rubs, or gallops  Abd: nondistended, soft  Extrem: No edema        Please refer above for cardiac ROS details.       Past History   Past Medical History:   Past Medical History:   Diagnosis Date    BPH (benign prostatic hyperplasia)     G6PD deficiency anemia (H24)     History of blood transfusion     Hypertension     Sleep apnea     uses cpap         Past Surgical History:   Past Surgical History:   Procedure Laterality Date    CV CORONARY ANGIOGRAM N/A 7/17/2023    Procedure: Coronary Angiogram;  Surgeon: Gustavo Sultana MD;  Location: [facility] CATH [facility] CV    CV LEFT HEART CATH N/A 7/17/2023    Procedure: Left Heart Catheterization;  Surgeon: Gustavo Sultana MD;  Location: [facility] CV    HERNIA REPAIR      x3 on R inguinal  side    LAPAROSCOPIC CHOLECYSTECTOMY N/A 12/18/2019    Procedure: CHOLECYSTECTOMY, LAPAROSCOPIC;  Surgeon: Therese Crow MD;  Location: [facility] OR    REPLACEMENT TOTAL KNEE Bilateral         Family History: History reviewed. No pertinent family history.     Social History:   Social History     Socioeconomic History    Marital status:      Spouse name: Not on file    Number of children: Not on file    Years of education: Not on file    Highest education level: Not on file   Occupational History    Not on file   Tobacco Use    Smoking status: Never    Smokeless tobacco: Former     Types: Chew    Tobacco comments:     2 tins/day   Vaping Use    Vaping Use: Never used   Substance and Sexual Activity    Alcohol use: Never    Drug use: Never    Sexual activity: Not on file   Other Topics Concern    Parent/sibling w/ CABG, MI or angioplasty before 65F 55M? Not Asked   Social History Narrative    Not on file     Social Determinants of Health     Financial Resource Strain: Not on file   Food Insecurity: Not on file   Transportation Needs: Not on file   Physical Activity: Not on file   Stress: Not on file   Social Connections: Not on file   Interpersonal  Safety: Not on file   Housing Stability: Not on file            Lab Results    Chemistry/lipid CBC Cardiac Enzymes/BNP/TSH/INR   Lab Results   Component Value Date    CHOL 102 07/17/2023    HDL 29 (L) 07/17/2023    TRIG 106 07/17/2023    BUN 13 07/11/2023     07/11/2023    CO2 27 07/11/2023    Lab Results   Component Value Date    WBC 8.9 07/11/2023    HGB 12.3 (L) 07/11/2023    HCT 37.8 (L) 07/11/2023     (H) 07/11/2023     07/11/2023    Lab Results   Component Value Date    TSH 3.07 01/14/2021    INR 1.04 01/13/2021

## 2023-12-12 NOTE — LETTER
12/12/2023    Juanita Sorenson, CNP  [address] [facility] [address]    RE: Kemal Rodriguez       Dear Colleague,     I had the pleasure of seeing Kemal Rodriguez in the [facility].    [facility] HEART CARE   [address]  [place] [address]   [email or web address]   OFFICE: [phone]     CARDIOLOGY CLINIC NOTE     Thank you, Dr. Sorenson, Juanita HOWARD, for asking the [facility] Heart Care team to see Mr. Kemal Rodriguez to Follow Up (angina)        Assessment/Recommendations   Assessment:    Coronary artery disease with documented vasospasm - continues with class III angina. Trial of L-arginine did not improve his anginal pain. Angina remains inadequately controlled with amlodipine, diltiazem and isosorbide mononitrate. Last dose increase of isosorbide did not improve symptoms.  Hypertension - controlled  Nicotine dependence - still intermittently uses chewing tobacco or nicotine lozenges.    Plan:  Start ranolazine 500 mg q12h   Continue other medications without changes though move diltiazem dosing to evening.  Encouraged attempts at aerobic conditioning and strength training.         History of Present Illness   Mr. Kemal Rodriguez is a 53 year old male with a significant past history of coronary artery disease with documented vasospasm who presents for follow-up.    Mr. Rodriguez was identified to have coronary artery disease with vasospasm as demonstrated by coronary angiogram and positive acetylcholine challenge. He most recently reported increased anginal chest pain refractory to isosorbide. He was advised a trial of L-arginine.    Today, Kemal continues to have exertional angina with a brisk walk. This is unchanged from 6 months ago. He did try L-arginine without any improvement in symptoms. He notes that at about 24 hours from taking all his antianginal medications at once his pain returns.      Other than noted above,   Rodriguez denies any chest pain/pressure/tightness, shortness of breath at rest or with exertion, light headedness/dizziness, pre-syncope, syncope, lower extremity swelling, palpitations, paroxysmal nocturnal dyspnea (PND), or orthopnea.     Cardiac Problems and Cardiac Diagnostics     Most Recent Cardiac testing:  ECG dated 7/17/23 (personaly reviewed and interpreted): sinus rhythm with first degree AV block and RBBB    ECHO (report reviewed):   TTE 7/17/23  The left ventricle is normal in size.  There is mild concentric left ventricular hypertrophy.  The visual ejection fraction is 65-70%.  No regional wall motion abnormalities noted.  Normal right ventricle size and systolic function.  The right ventricular systolic pressure is approximated at 27mmHg plus the  right atrial pressure.  IVC diameter <2.1 cm collapsing >50% with sniff suggests a normal RA pressure  of 3 mmHg.  There is no comparison study available.    Cardiac cath: from 7/17/23 demonstrated   Left Main   The vessel was visualized by selective angiography and is moderate in size. There was 0% vessel disease.      Left Anterior Descending   Mid LAD lesion is 15% stenosed.      Ramus Intermedius   The vessel was visualized by selective angiography and is large. There was 0% vessel disease.      Left Circumflex   The vessel was visualized by selective angiography and is moderate in size. There was 0% vessel disease.      Right Coronary Artery   Dist RCA lesion is 10% stenosed.             Medications  Allergies   Current Outpatient Medications   Medication Sig Dispense Refill    albuterol (PROAIR HFA/PROVENTIL HFA/VENTOLIN HFA) 108 (90 Base) MCG/ACT inhaler 2 puffs every 6 hours      amLODIPine (NORVASC) 10 MG tablet Take 10 mg by mouth daily      aspirin 81 MG EC tablet Take 81 mg by mouth At Bedtime      atorvastatin (LIPITOR) 40 MG tablet Take 40 mg by mouth At Bedtime       buPROPion (WELLBUTRIN XL) 300 MG 24 hr tablet Take 300 mg by mouth every  morning      cyclobenzaprine (FLEXERIL) 10 MG tablet Take 10 mg by mouth 3 times daily as needed      diltiazem ER (DILT-XR) 180 MG 24 hr capsule Take 180 mg by mouth daily      isosorbide mononitrate (IMDUR) 120 MG 24 HR ER tablet Take 1 tablet (120 mg) by mouth daily 90 tablet 3    Melatonin 10 MG TABS tablet Take 10 mg by mouth nightly as needed for sleep      Multiple Vitamin (MULTI-VITAMINS) TABS Take 1 tablet by mouth daily       nicotine (NICORETTE) 4 MG lozenge Place 4 mg inside cheek every hour as needed      nitroGLYcerin (NITROSTAT) 0.4 MG sublingual tablet For chest pain place 1 tablet under the tongue every 5 minutes for 3 doses. If symptoms persist 5 minutes after 1st dose call 911. 25 tablet 4    omega 3 1000 MG CAPS Take 1 g by mouth 2 times daily       ranolazine (RANEXA) 500 MG 12 hr tablet Take 1 tablet (500 mg) by mouth 2 times daily 180 tablet 3    tamsulosin (FLOMAX) 0.4 MG capsule Take 0.4 mg by mouth daily      traZODone (DESYREL) 50 MG tablet Take 50 mg by mouth at bedtime      vitamin C (ASCORBIC ACID) 500 MG tablet Take 500 mg by mouth daily      vitamin D3 (CHOLECALCIFEROL) 50 mcg (2000 units) tablet Take 1 tablet by mouth daily      arginine 1000 MG tablet Take 3,000 mg by mouth 3 times daily (Patient not taking: Reported on 12/12/2023)      diazepam (VALIUM) 5 MG tablet Take 5 mg by mouth        Allergies   Allergen Reactions    Quinine GI Disturbance and Nausea and Vomiting     Nausea & Vomiting      Aspirin      PN: LW Reaction: Blood disorder      Pcn [Penicillins]     Sulfa Antibiotics         Physical Examination Review of Systems   Vitals: /68 (BP Location: Right arm, Patient Position: Sitting, Cuff Size: Adult Large)   Pulse 76   Resp 14   Wt 120.7 kg (266 lb)   BMI 36.08 kg/m    BMI= Body mass index is 36.08 kg/m .  Wt Readings from Last 3 Encounters:   12/12/23 120.7 kg (266 lb)   08/28/23 119.4 kg (263 lb 3.2 oz)   07/17/23 117.9 kg (260 lb)       General: pleasant  male. No acute distress.  HENT: external ears normal. Nares patent. Mucous membranes moist.  Eyes: perrla, extraocular muscles intact. No scleral icterus.   Neck: No JVD  Lungs: clear to auscultation  COR: regular rate and rhythm, No murmurs, rubs, or gallops  Abd: nondistended, soft  Extrem: No edema        Please refer above for cardiac ROS details.       Past History   Past Medical History:   Past Medical History:   Diagnosis Date    BPH (benign prostatic hyperplasia)     G6PD deficiency anemia (H24)     History of blood transfusion     Hypertension     Sleep apnea     uses cpap         Past Surgical History:   Past Surgical History:   Procedure Laterality Date    CV CORONARY ANGIOGRAM N/A 7/17/2023    Procedure: Coronary Angiogram;  Surgeon: Gustavo Sultana MD;  Location: [facility] CV    CV LEFT HEART CATH N/A 7/17/2023    Procedure: Left Heart Catheterization;  Surgeon: Gustavo Sultana MD;  Location: [facility] CV    HERNIA REPAIR      x3 on R inguinal  side    LAPAROSCOPIC CHOLECYSTECTOMY N/A 12/18/2019    Procedure: CHOLECYSTECTOMY, LAPAROSCOPIC;  Surgeon: Therese Crow MD;  Location: [facility] OR    REPLACEMENT TOTAL KNEE Bilateral         Family History: History reviewed. No pertinent family history.     Social History:   Social History     Socioeconomic History    Marital status:      Spouse name: Not on file    Number of children: Not on file    Years of education: Not on file    Highest education level: Not on file   Occupational History    Not on file   Tobacco Use    Smoking status: Never    Smokeless tobacco: Former     Types: Chew    Tobacco comments:     2 tins/day   Vaping Use    Vaping Use: Never used   Substance and Sexual Activity    Alcohol use: Never    Drug use: Never    Sexual activity: Not on file   Other Topics Concern    Parent/sibling w/ CABG, MI or angioplasty before 65F 55M? Not Asked   Social History Narrative    Not on file     Social Determinants of Health      Financial Resource Strain: Not on file   Food Insecurity: Not on file   Transportation Needs: Not on file   Physical Activity: Not on file   Stress: Not on file   Social Connections: Not on file   Interpersonal Safety: Not on file   Housing Stability: Not on file            Lab Results    Chemistry/lipid CBC Cardiac Enzymes/BNP/TSH/INR   Lab Results   Component Value Date    CHOL 102 07/17/2023    HDL 29 (L) 07/17/2023    TRIG 106 07/17/2023    BUN 13 07/11/2023     07/11/2023    CO2 27 07/11/2023    Lab Results   Component Value Date    WBC 8.9 07/11/2023    HGB 12.3 (L) 07/11/2023    HCT 37.8 (L) 07/11/2023     (H) 07/11/2023     07/11/2023    Lab Results   Component Value Date    TSH 3.07 01/14/2021    INR 1.04 01/13/2021                Thank you for allowing me to participate in the care of your patient.      Sincerely,     Paul Dawkins MD     [facility]  cc:   Meek Morel MD  [address]  [address]  [place] [address]

## 2023-12-12 NOTE — PATIENT INSTRUCTIONS
It was a pleasure to meet with you today.      Below is a summary of your visit.   Try taking ranolazine 500 mg every 12 hours in addition to your other medications to help reduce your anginal pain.  Try moving your diltiazem dosing to the evening.  It is okay and recommended to be as active as you can tolerate. I recommend both aerobic and strength conditioning.  I recommend stopping all nicotine use as this substance can make vasospasm worse.  Follow up with me in 6 months     Please do not hesitate to call the [facility] with any questions or concerns at [phone].     Sincerely,

## 2024-03-22 ENCOUNTER — HOSPITAL ENCOUNTER (OUTPATIENT)
Dept: NUCLEAR MEDICINE | Facility: CLINIC | Age: 54
Discharge: HOME | End: 2024-03-22
Attending: NURSE PRACTITIONER
Payer: COMMERCIAL

## 2024-03-22 ENCOUNTER — HOSPITAL ENCOUNTER (OUTPATIENT)
Dept: CARDIOLOGY | Facility: CLINIC | Age: 54
Discharge: HOME | End: 2024-03-22
Attending: NURSE PRACTITIONER
Payer: COMMERCIAL

## 2024-03-22 DIAGNOSIS — R06.02 SOB (SHORTNESS OF BREATH): ICD-10-CM

## 2024-03-22 DIAGNOSIS — R07.89 CHEST DISCOMFORT: ICD-10-CM

## 2024-03-22 LAB
CV STRESS CURRENT BP HE: NORMAL
CV STRESS CURRENT HR HE: 79
CV STRESS CURRENT HR HE: 79
CV STRESS CURRENT HR HE: 80
CV STRESS CURRENT HR HE: 82
CV STRESS CURRENT HR HE: 83
CV STRESS CURRENT HR HE: 83
CV STRESS CURRENT HR HE: 84
CV STRESS CURRENT HR HE: 85
CV STRESS CURRENT HR HE: 86
CV STRESS CURRENT HR HE: 86
CV STRESS CURRENT HR HE: 87
CV STRESS CURRENT HR HE: 88
CV STRESS CURRENT HR HE: 88
CV STRESS CURRENT HR HE: 90
CV STRESS CURRENT HR HE: 91
CV STRESS CURRENT HR HE: 92
CV STRESS DEVIATION TIME HE: NORMAL
CV STRESS ECHO PERCENT HR HE: NORMAL
CV STRESS EXERCISE STAGE HE: NORMAL
CV STRESS FINAL RESTING BP HE: NORMAL
CV STRESS FINAL RESTING HR HE: 83
CV STRESS MAX HR HE: 92
CV STRESS MAX TREADMILL GRADE HE: 0
CV STRESS MAX TREADMILL SPEED HE: 0
CV STRESS PEAK DIA BP HE: NORMAL
CV STRESS PEAK SYS BP HE: NORMAL
CV STRESS PHASE HE: NORMAL
CV STRESS PROTOCOL HE: NORMAL
CV STRESS RESTING PT POSITION HE: NORMAL
CV STRESS ST DEVIATION AMOUNT HE: NORMAL
CV STRESS ST DEVIATION ELEVATION HE: NORMAL
CV STRESS ST EVELATION AMOUNT HE: NORMAL
CV STRESS TEST TYPE HE: NORMAL
CV STRESS TOTAL STAGE TIME MIN 1 HE: NORMAL
RATE PRESSURE PRODUCT: NORMAL
STRESS ECHO BASELINE DIASTOLIC HE: 81
STRESS ECHO BASELINE HR: 79
STRESS ECHO BASELINE SYSTOLIC BP: 159
STRESS ECHO CALCULATED PERCENT HR: 55 %
STRESS ECHO LAST STRESS DIASTOLIC BP: 78
STRESS ECHO LAST STRESS HR: 86
STRESS ECHO LAST STRESS SYSTOLIC BP: 168
STRESS ECHO TARGET HR: 166

## 2024-03-22 PROCEDURE — 343N000001 HC RX 343

## 2024-03-22 PROCEDURE — 93017 CV STRESS TEST TRACING ONLY: CPT

## 2024-03-22 PROCEDURE — A9500 TC99M SESTAMIBI: HCPCS

## 2024-03-22 PROCEDURE — 78452 HT MUSCLE IMAGE SPECT MULT: CPT | Mod: 26 | Performed by: STUDENT IN AN ORGANIZED HEALTH CARE EDUCATION/TRAINING PROGRAM

## 2024-03-22 PROCEDURE — 78452 HT MUSCLE IMAGE SPECT MULT: CPT

## 2024-03-22 PROCEDURE — 250N000011 HC RX IP 250 OP 636: Mod: JZ

## 2024-03-22 PROCEDURE — 93018 CV STRESS TEST I&R ONLY: CPT | Performed by: STUDENT IN AN ORGANIZED HEALTH CARE EDUCATION/TRAINING PROGRAM

## 2024-03-22 RX ORDER — AMINOPHYLLINE 25 MG/ML
50 INJECTION, SOLUTION INTRAVENOUS
Status: DISCONTINUED | OUTPATIENT
Start: 2024-03-22 | End: 2024-03-22 | Stop reason: HOSPADM

## 2024-03-22 RX ORDER — REGADENOSON 0.08 MG/ML
0.4 INJECTION, SOLUTION INTRAVENOUS ONCE
Status: COMPLETED | OUTPATIENT
Start: 2024-03-22 | End: 2024-03-22

## 2024-03-22 RX ADMIN — REGADENOSON 0.4 MG: 0.08 INJECTION, SOLUTION INTRAVENOUS at 08:11

## 2024-03-22 RX ADMIN — Medication 7.89 MILLICURIE: at 07:27

## 2024-03-22 RX ADMIN — Medication 42.9 MILLICURIE: at 08:15

## 2024-06-17 ENCOUNTER — MEDICAL CORRESPONDENCE (OUTPATIENT)
Dept: HEALTH INFORMATION MANAGEMENT | Facility: CLINIC | Age: 54
End: 2024-06-17
Payer: COMMERCIAL

## 2024-06-17 ENCOUNTER — TRANSFERRED RECORDS (OUTPATIENT)
Dept: HEALTH INFORMATION MANAGEMENT | Facility: CLINIC | Age: 54
End: 2024-06-17
Payer: COMMERCIAL

## 2024-09-03 ENCOUNTER — TRANSFERRED RECORDS (OUTPATIENT)
Dept: HEALTH INFORMATION MANAGEMENT | Facility: CLINIC | Age: 54
End: 2024-09-03
Payer: COMMERCIAL

## 2024-09-03 ENCOUNTER — MEDICAL CORRESPONDENCE (OUTPATIENT)
Dept: HEALTH INFORMATION MANAGEMENT | Facility: CLINIC | Age: 54
End: 2024-09-03
Payer: COMMERCIAL

## 2024-09-04 ENCOUNTER — TELEPHONE (OUTPATIENT)
Dept: CARDIOLOGY | Facility: CLINIC | Age: 54
End: 2024-09-04
Payer: COMMERCIAL

## 2024-09-04 NOTE — TELEPHONE ENCOUNTER
Health Call Center    Phone Message    May a detailed message be left on voicemail: yes     Reason for Call: Other: Pradeep called responding to a message he got from the clinic to r/s his appt to 9/10 with Dr. Dawkins. Pradeep states he will be out of town and is unable to make the appt and would like to speak with his team. Please reach out to Pradeep to discuss. Thank you!     Action Taken: Other: Cardiology    Travel Screening: Not Applicable    Thank you!  [facility]       Date of Service:

## 2024-09-05 NOTE — TELEPHONE ENCOUNTER
Patient scheduled 9/16/24 with Dr. Arellano. Patient declined sooner appointment to work schedule.

## 2024-09-16 ENCOUNTER — ORDERS ONLY (AUTO-RELEASED) (OUTPATIENT)
Dept: CARDIOLOGY | Facility: CLINIC | Age: 54
End: 2024-09-16
Payer: COMMERCIAL

## 2024-09-16 ENCOUNTER — OFFICE VISIT (OUTPATIENT)
Dept: CARDIOLOGY | Facility: CLINIC | Age: 54
End: 2024-09-16
Payer: COMMERCIAL

## 2024-09-16 VITALS
HEIGHT: 72 IN | SYSTOLIC BLOOD PRESSURE: 132 MMHG | BODY MASS INDEX: 34.81 KG/M2 | RESPIRATION RATE: 17 BRPM | DIASTOLIC BLOOD PRESSURE: 56 MMHG | WEIGHT: 257 LBS | HEART RATE: 77 BPM

## 2024-09-16 DIAGNOSIS — I49.3 PVC'S (PREMATURE VENTRICULAR CONTRACTIONS): ICD-10-CM

## 2024-09-16 DIAGNOSIS — I49.3 PVC'S (PREMATURE VENTRICULAR CONTRACTIONS): Primary | ICD-10-CM

## 2024-09-16 PROCEDURE — 99215 OFFICE O/P EST HI 40 MIN: CPT | Performed by: INTERNAL MEDICINE

## 2024-09-16 PROCEDURE — G2211 COMPLEX E/M VISIT ADD ON: HCPCS | Performed by: INTERNAL MEDICINE

## 2024-09-16 RX ORDER — DILTIAZEM HYDROCHLORIDE 240 MG/1
240 CAPSULE, EXTENDED RELEASE ORAL DAILY
COMMUNITY
Start: 2024-06-25 | End: 2024-10-04 | Stop reason: ALTCHOICE

## 2024-09-16 RX ORDER — SPIRONOLACTONE 25 MG/1
25 TABLET ORAL DAILY
COMMUNITY
Start: 2024-08-12

## 2024-09-16 RX ORDER — SODIUM FLUORIDE 5 MG/G
CREAM DENTAL
COMMUNITY
Start: 2024-06-10

## 2024-09-16 NOTE — PATIENT INSTRUCTIONS
Mr. Kemal Rodriguez,     It was a pleasure to see you in the office today. My recommendations for you include:   1. Zio monitor for 7 days to evaluate PVCs  2. May consider changing diltiazem to metoprolol     Please do not hesitate to call the [facility] Care clinic with any questions or concerns at [phone].    Sincerely,     Peyton Arellano MD

## 2024-09-16 NOTE — PROGRESS NOTES
"  HEART CARE ENCOUNTER CONSULTATON NOTE      [facility]  [phone]      Assessment/Recommendations   Assessment:    Coronary artery disease with documented vasospasm: Underwent angiogram with acetylcholine challenge 2016 that did document vasospasm.  Currently symptoms are very well-controlled on current medical therapy.  Ranolazine helps quite a bit.  Palpitations: Symptoms seem consistent with symptomatic PVCs.  In the spring underwent a Zio patch that is unavailable to me at this time.  PCP reports had occasional PVCs during that time.  Symptoms have now worsened and recommend repeating Zio patch and consider adjusting medical therapy accordingly.  Hypertension: Well-controlled  Nicotine dependence: Quit smoking and chewing tobacco years ago.  Currently using nicotine lozenges     Plan:  7-day Zio patch  Consider changing diltiazem to metoprolol depending on results of Zio patch  Follow-up with me in a couple months         History of Present Illness/Subjective    HPI: Kemal Rodriguez is a 54 year old male with history of minimal coronary disease with documented vasospasm, hypertension who I am seeing today for an urgent visit due to symptoms of palpitations.  He noted occasional \"feeling like he was hit with a sledgehammer\" in the past since 2016.  This is different from his anginal pain he experiences with vasospasm that feels like exertional burning chest pain.  The uncomfortable feeling of skipped beats has become worse recently.  He has noticed that it does seem related to stressful situations at times.  He quit drinking years ago and also quit smoking.  He was using chewing tobacco until a few years ago and is now using nicotine lozenges.  He just joined a gym and plans on going in the beginning of October.    ECHO (report reviewed):   TTE 7/17/23  The left ventricle is normal in size.  There is mild concentric left ventricular hypertrophy.  The visual ejection fraction is " 65-70%.  No regional wall motion abnormalities noted.  Normal right ventricle size and systolic function.  The right ventricular systolic pressure is approximated at 27mmHg plus the  right atrial pressure.  IVC diameter <2.1 cm collapsing >50% with sniff suggests a normal RA pressure  of 3 mmHg.  There is no comparison study available.     Cardiac cath: from 7/17/23 demonstrated   Left Main   The vessel was visualized by selective angiography and is moderate in size. There was 0% vessel disease.      Left Anterior Descending   Mid LAD lesion is 15% stenosed.      Ramus Intermedius   The vessel was visualized by selective angiography and is large. There was 0% vessel disease.      Left Circumflex   The vessel was visualized by selective angiography and is moderate in size. There was 0% vessel disease.      Right Coronary Artery   Dist RCA lesion is 10% stenosed.       The longitudinal plan of care for the diagnosis(es)/condition(s) as documented were addressed during this visit. Due to the added complexity in care, I will continue to support Kemal in the subsequent management and with ongoing continuity of care.      Physical Examination  Review of Systems   Vitals: /56 (BP Location: Right arm, Patient Position: Sitting, Cuff Size: Adult Large)   Pulse 77   Resp 17   Ht 1.829 m (6')   Wt 116.6 kg (257 lb)   BMI 34.86 kg/m    BMI= Body mass index is 34.86 kg/m .  Wt Readings from Last 3 Encounters:   09/16/24 116.6 kg (257 lb)   12/12/23 120.7 kg (266 lb)   08/28/23 119.4 kg (263 lb 3.2 oz)       General Appearance:   no distress, normal body habitus   ENT/Mouth: membranes moist, no oral lesions or bleeding gums.      EYES:  no scleral icterus, normal conjunctivae   Neck: no carotid bruits or thyromegaly   Chest/Lungs:   lungs are clear to auscultation   Cardiovascular:   Regular. Normal first and second heart sounds with no murmur no edema bilaterally        Extremities: no cyanosis or clubbing   Skin:  no xanthelasma, warm.    Neurologic: normal  bilateral, no tremors     Psychiatric: alert and oriented x3, calm        Please refer above for cardiac ROS details.        Medical History  Surgical History Family History Social History   Past Medical History:   Diagnosis Date    BPH (benign prostatic hyperplasia)     G6PD deficiency anemia (H24)     History of blood transfusion     Hypertension     Sleep apnea     uses cpap      Past Surgical History:   Procedure Laterality Date    CV CORONARY ANGIOGRAM N/A 7/17/2023    Procedure: Coronary Angiogram;  Surgeon: Gustavo Sultana MD;  Location: [facility] CATH LAB CV    CV LEFT HEART CATH N/A 7/17/2023    Procedure: Left Heart Catheterization;  Surgeon: Gustavo Sultana MD;  Location: [facility] CV    HERNIA REPAIR      x3 on R inguinal  side    LAPAROSCOPIC CHOLECYSTECTOMY N/A 12/18/2019    Procedure: CHOLECYSTECTOMY, LAPAROSCOPIC;  Surgeon: Therese Crow MD;  Location: [facility] OR    REPLACEMENT TOTAL KNEE Bilateral      No family history on file.     Social History     Socioeconomic History    Marital status:      Spouse name: Not on file    Number of children: Not on file    Years of education: Not on file    Highest education level: Not on file   Occupational History    Not on file   Tobacco Use    Smoking status: Never    Smokeless tobacco: Former     Types: Chew    Tobacco comments:     2 tins/day   Vaping Use    Vaping status: Never Used   Substance and Sexual Activity    Alcohol use: Never    Drug use: Never    Sexual activity: Not on file   Other Topics Concern    Parent/sibling w/ CABG, MI or angioplasty before 65F 55M? Not Asked   Social History Narrative    Not on file     Social Determinants of Health     Financial Resource Strain: Low Risk  (3/1/2023)    Received from [facility]    Overall Financial Resource Strain (CARDIA)     Difficulty of Paying Living Expenses: Not very hard   Food Insecurity: No Food Insecurity (3/1/2023)    Received  from [facility]    Hunger Vital Sign     Worried About Running Out of Food in the Last Year: Never true     Ran Out of Food in the Last Year: Never true   Transportation Needs: No Transportation Needs (3/1/2023)    Received from [facility]    PRAPARE - Transportation     Lack of Transportation (Medical): No     Lack of Transportation (Non-Medical): No   Physical Activity: Insufficiently Active (3/1/2023)    Received from [facility]    Exercise Vital Sign     Days of Exercise per Week: 2 days     Minutes of Exercise per Session: 30 min   Stress: No Stress Concern Present (3/1/2023)    Received from [facility]    [place] [employer] - Occupational Stress Questionnaire     Feeling of Stress : Not at all   Social Connections: Unknown (7/31/2024)    Received from [employer]    Social Network     Social Network: Not on file   Interpersonal Safety: Not At Risk (7/31/2024)    Received from [place]     Over the last 12 months how often did your partner physically hurt you?: 1     Over the last 12 months how often did your partner insult you or talk down to you?: 1     Over the last 12 months how often did your partner threaten you with physical harm?: 1     Over the last 12 months how often did your partner scream or curse at you?: 1   Housing Stability: Low Risk  (3/1/2023)    Received from [facility]    Housing Stability Vital Sign     Unable to Pay for Housing in the Last Year: No     Number of Places Lived in the Last Year: 1     In the last 12 months, was there a time when you did not have a steady place to sleep or slept in a shelter (including now)?: No           Medications  Allergies   Current Outpatient Medications   Medication Sig Dispense Refill    amLODIPine (NORVASC) 10 MG tablet Take 10 mg by mouth daily      aspirin 81 MG EC tablet Take 81 mg by mouth At Bedtime      atorvastatin (LIPITOR) 40 MG tablet Take 40 mg by mouth At Bedtime       buPROPion (WELLBUTRIN XL)  300 MG 24 hr tablet Take 300 mg by mouth every morning      cyclobenzaprine (FLEXERIL) 10 MG tablet Take 10 mg by mouth 3 times daily as needed      diclofenac (VOLTAREN) 1 % topical gel Apply 2 g topically as needed for moderate pain.      diltiazem ER (DILT-XR) 240 MG 24 hr ER beaded capsule Take 240 mg by mouth daily.      isosorbide mononitrate (IMDUR) 120 MG 24 HR ER tablet Take 1 tablet (120 mg) by mouth daily 90 tablet 3    Melatonin 10 MG TABS tablet Take 10 mg by mouth nightly as needed for sleep      Multiple Vitamin (MULTI-VITAMINS) TABS Take 1 tablet by mouth daily       nicotine (NICORETTE) 4 MG lozenge Place 4 mg inside cheek every hour as needed      nitroGLYcerin (NITROSTAT) 0.4 MG sublingual tablet For chest pain place 1 tablet under the tongue every 5 minutes for 3 doses. If symptoms persist 5 minutes after 1st dose call 911. 25 tablet 4    omega 3 1000 MG CAPS Take 1 g by mouth 2 times daily       ranolazine (RANEXA) 500 MG 12 hr tablet Take 1 tablet (500 mg) by mouth 2 times daily 180 tablet 3    SODIUM FLUORIDE 5000 PLUS 1.1 % CREA APPLY A SMALL AMOUNT TO TOOHBRUSH AND BRUSH TEETH AT BEDTIME DO NOT DRINK OR EAT FOR 30 MINUTES AFTER USE      spironolactone (ALDACTONE) 25 MG tablet Take 25 mg by mouth daily.      tamsulosin (FLOMAX) 0.4 MG capsule Take 0.4 mg by mouth daily      traZODone (DESYREL) 50 MG tablet Take 50 mg by mouth nightly as needed for sleep.      vitamin C (ASCORBIC ACID) 500 MG tablet Take 500 mg by mouth daily      vitamin D3 (CHOLECALCIFEROL) 50 mcg (2000 units) tablet Take 1 tablet by mouth daily      arginine 1000 MG tablet Take 3,000 mg by mouth 3 times daily (Patient not taking: Reported on 12/12/2023)         Allergies   Allergen Reactions    Carbamazepine      Other Reaction(s): Confusion    Quinine GI Disturbance and Nausea and Vomiting     Nausea & Vomiting      Aspirin      PN: LW Reaction: Blood disorder      Hydrocodone GI Disturbance    Pcn [Penicillins]     Sulfa  "Antibiotics           Lab Results    Chemistry/lipid CBC Cardiac Enzymes/BNP/TSH/INR   Recent Labs   Lab Test 07/17/23  0655   CHOL 102   HDL 29*   LDL 52   TRIG 106     Recent Labs   Lab Test 07/17/23  0655 01/14/21  0505   LDL 52 79     Recent Labs   Lab Test 07/11/23  1024      POTASSIUM 3.8   CHLORIDE 105   CO2 27      BUN 13   CR 0.92   GFRESTIMATED >90   DALE 9.3     Recent Labs   Lab Test 07/11/23  1024 01/13/21  2100 12/18/19  0620   CR 0.92 0.72 0.77     No results for input(s): \"A1C\" in the last 82590 hours.       Recent Labs   Lab Test 07/11/23  1024   WBC 8.9   HGB 12.3*   HCT 37.8*   *        Recent Labs   Lab Test 07/11/23  1024 01/14/21  0515 01/13/21  2100   HGB 12.3* 11.1* 11.3*    No results for input(s): \"TROPONINI\" in the last 18436 hours.  No results for input(s): \"BNP\", \"NTBNPI\", \"NTBNP\" in the last 38761 hours.  Recent Labs   Lab Test 01/14/21  0505   TSH 3.07     Recent Labs   Lab Test 01/13/21  2100   INR 1.04        Peyton Arellano MD                                      "

## 2024-09-16 NOTE — LETTER
"9/16/2024    Juanita Sorenson, CNP  [address] [facility] [address]    RE: Kemal Rodriguez       Dear Colleague,     I had the pleasure of seeing Kemal Rodriguez in the [facility].    HEART CARE ENCOUNTER CONSULTATON NOTE      [facility]  [phone]      Assessment/Recommendations   Assessment:    Coronary artery disease with documented vasospasm: Underwent angiogram with acetylcholine challenge 2016 that did document vasospasm.  Currently symptoms are very well-controlled on current medical therapy.  Ranolazine helps quite a bit.  Palpitations: Symptoms seem consistent with symptomatic PVCs.  In the spring underwent a Zio patch that is unavailable to me at this time.  PCP reports had occasional PVCs during that time.  Symptoms have now worsened and recommend repeating Zio patch and consider adjusting medical therapy accordingly.  Hypertension: Well-controlled  Nicotine dependence: Quit smoking and chewing tobacco years ago.  Currently using nicotine lozenges     Plan:  7-day Zio patch  Consider changing diltiazem to metoprolol depending on results of Zio patch  Follow-up with me in a couple months         History of Present Illness/Subjective    HPI: Kemal Rodriguez is a 54 year old male with history of minimal coronary disease with documented vasospasm, hypertension who I am seeing today for an urgent visit due to symptoms of palpitations.  He noted occasional \"feeling like he was hit with a sledgehammer\" in the past since 2016.  This is different from his anginal pain he experiences with vasospasm that feels like exertional burning chest pain.  The uncomfortable feeling of skipped beats has become worse recently.  He has noticed that it does seem related to stressful situations at times.  He quit drinking years ago and also quit smoking.  He was using chewing tobacco until a few years ago and is now using nicotine lozenges.  He just joined a gym and plans " on going in the beginning of October.    ECHO (report reviewed):   TTE 7/17/23  The left ventricle is normal in size.  There is mild concentric left ventricular hypertrophy.  The visual ejection fraction is 65-70%.  No regional wall motion abnormalities noted.  Normal right ventricle size and systolic function.  The right ventricular systolic pressure is approximated at 27mmHg plus the  right atrial pressure.  IVC diameter <2.1 cm collapsing >50% with sniff suggests a normal RA pressure  of 3 mmHg.  There is no comparison study available.     Cardiac cath: from 7/17/23 demonstrated   Left Main   The vessel was visualized by selective angiography and is moderate in size. There was 0% vessel disease.      Left Anterior Descending   Mid LAD lesion is 15% stenosed.      Ramus Intermedius   The vessel was visualized by selective angiography and is large. There was 0% vessel disease.      Left Circumflex   The vessel was visualized by selective angiography and is moderate in size. There was 0% vessel disease.      Right Coronary Artery   Dist RCA lesion is 10% stenosed.       The longitudinal plan of care for the diagnosis(es)/condition(s) as documented were addressed during this visit. Due to the added complexity in care, I will continue to support Kemal in the subsequent management and with ongoing continuity of care.      Physical Examination  Review of Systems   Vitals: /56 (BP Location: Right arm, Patient Position: Sitting, Cuff Size: Adult Large)   Pulse 77   Resp 17   Ht 1.829 m (6')   Wt 116.6 kg (257 lb)   BMI 34.86 kg/m    BMI= Body mass index is 34.86 kg/m .  Wt Readings from Last 3 Encounters:   09/16/24 116.6 kg (257 lb)   12/12/23 120.7 kg (266 lb)   08/28/23 119.4 kg (263 lb 3.2 oz)       General Appearance:   no distress, normal body habitus   ENT/Mouth: membranes moist, no oral lesions or bleeding gums.      EYES:  no scleral icterus, normal conjunctivae   Neck: no carotid bruits or  thyromegaly   Chest/Lungs:   lungs are clear to auscultation   Cardiovascular:   Regular. Normal first and second heart sounds with no murmur no edema bilaterally        Extremities: no cyanosis or clubbing   Skin: no xanthelasma, warm.    Neurologic: normal  bilateral, no tremors     Psychiatric: alert and oriented x3, calm        Please refer above for cardiac ROS details.        Medical History  Surgical History Family History Social History   Past Medical History:   Diagnosis Date     BPH (benign prostatic hyperplasia)      G6PD deficiency anemia (H24)      History of blood transfusion      Hypertension      Sleep apnea     uses cpap      Past Surgical History:   Procedure Laterality Date     CV CORONARY ANGIOGRAM N/A 7/17/2023    Procedure: Coronary Angiogram;  Surgeon: Gustavo Sultana MD;  Location: [facility] CATH LAB CV     CV LEFT HEART CATH N/A 7/17/2023    Procedure: Left Heart Catheterization;  Surgeon: Gustavo Sultana MD;  Location: [facility] CATH LAB CV     HERNIA REPAIR      x3 on R inguinal  side     LAPAROSCOPIC CHOLECYSTECTOMY N/A 12/18/2019    Procedure: CHOLECYSTECTOMY, LAPAROSCOPIC;  Surgeon: Therese Crow MD;  Location: [facility] OR     REPLACEMENT TOTAL KNEE Bilateral      No family history on file.     Social History     Socioeconomic History     Marital status:      Spouse name: Not on file     Number of children: Not on file     Years of education: Not on file     Highest education level: Not on file   Occupational History     Not on file   Tobacco Use     Smoking status: Never     Smokeless tobacco: Former     Types: Chew     Tobacco comments:     2 tins/day   Vaping Use     Vaping status: Never Used   Substance and Sexual Activity     Alcohol use: Never     Drug use: Never     Sexual activity: Not on file   Other Topics Concern     Parent/sibling w/ CABG, MI or angioplasty before 65F 55M? Not Asked   Social History Narrative     Not on file     Social Determinants of Health      Financial Resource Strain: Low Risk  (3/1/2023)    Received from [facility]    Overall Financial Resource Strain (CARDIA)      Difficulty of Paying Living Expenses: Not very hard   Food Insecurity: No Food Insecurity (3/1/2023)    Received from [facility]    Hunger Vital Sign      Worried About Running Out of Food in the Last Year: Never true      Ran Out of Food in the Last Year: Never true   Transportation Needs: No Transportation Needs (3/1/2023)    Received from [facility]    PRAPARE - Transportation      Lack of Transportation (Medical): No      Lack of Transportation (Non-Medical): No   Physical Activity: Insufficiently Active (3/1/2023)    Received from [facility]    Exercise Vital Sign      Days of Exercise per Week: 2 days      Minutes of Exercise per Session: 30 min   Stress: No Stress Concern Present (3/1/2023)    Received from [facility]    [place] [employer] - Occupational Stress Questionnaire      Feeling of Stress : Not at all   Social Connections: Unknown (7/31/2024)    Received from [place]    Social Network      Social Network: Not on file   Interpersonal Safety: Not At Risk (7/31/2024)    Received from [facility]    HITS      Over the last 12 months how often did your partner physically hurt you?: 1      Over the last 12 months how often did your partner insult you or talk down to you?: 1      Over the last 12 months how often did your partner threaten you with physical harm?: 1      Over the last 12 months how often did your partner scream or curse at you?: 1   Housing Stability: Low Risk  (3/1/2023)    Received from [facility]    Housing Stability Vital Sign      Unable to Pay for Housing in the Last Year: No      Number of Places Lived in the Last Year: 1      In the last 12 months, was there a time when you did not have a steady place to sleep or slept in a shelter (including now)?: No           Medications  Allergies   Current Outpatient Medications    Medication Sig Dispense Refill     amLODIPine (NORVASC) 10 MG tablet Take 10 mg by mouth daily       aspirin 81 MG EC tablet Take 81 mg by mouth At Bedtime       atorvastatin (LIPITOR) 40 MG tablet Take 40 mg by mouth At Bedtime        buPROPion (WELLBUTRIN XL) 300 MG 24 hr tablet Take 300 mg by mouth every morning       cyclobenzaprine (FLEXERIL) 10 MG tablet Take 10 mg by mouth 3 times daily as needed       diclofenac (VOLTAREN) 1 % topical gel Apply 2 g topically as needed for moderate pain.       diltiazem ER (DILT-XR) 240 MG 24 hr ER beaded capsule Take 240 mg by mouth daily.       isosorbide mononitrate (IMDUR) 120 MG 24 HR ER tablet Take 1 tablet (120 mg) by mouth daily 90 tablet 3     Melatonin 10 MG TABS tablet Take 10 mg by mouth nightly as needed for sleep       Multiple Vitamin (MULTI-VITAMINS) TABS Take 1 tablet by mouth daily        nicotine (NICORETTE) 4 MG lozenge Place 4 mg inside cheek every hour as needed       nitroGLYcerin (NITROSTAT) 0.4 MG sublingual tablet For chest pain place 1 tablet under the tongue every 5 minutes for 3 doses. If symptoms persist 5 minutes after 1st dose call 911. 25 tablet 4     omega 3 1000 MG CAPS Take 1 g by mouth 2 times daily        ranolazine (RANEXA) 500 MG 12 hr tablet Take 1 tablet (500 mg) by mouth 2 times daily 180 tablet 3     SODIUM FLUORIDE 5000 PLUS 1.1 % CREA APPLY A SMALL AMOUNT TO TOOHBRUSH AND BRUSH TEETH AT BEDTIME DO NOT DRINK OR EAT FOR 30 MINUTES AFTER USE       spironolactone (ALDACTONE) 25 MG tablet Take 25 mg by mouth daily.       tamsulosin (FLOMAX) 0.4 MG capsule Take 0.4 mg by mouth daily       traZODone (DESYREL) 50 MG tablet Take 50 mg by mouth nightly as needed for sleep.       vitamin C (ASCORBIC ACID) 500 MG tablet Take 500 mg by mouth daily       vitamin D3 (CHOLECALCIFEROL) 50 mcg (2000 units) tablet Take 1 tablet by mouth daily       arginine 1000 MG tablet Take 3,000 mg by mouth 3 times daily (Patient not taking: Reported on  "12/12/2023)         Allergies   Allergen Reactions     Carbamazepine      Other Reaction(s): Confusion     Quinine GI Disturbance and Nausea and Vomiting     Nausea & Vomiting       Aspirin      PN: LW Reaction: Blood disorder       Hydrocodone GI Disturbance     Pcn [Penicillins]      Sulfa Antibiotics           Lab Results    Chemistry/lipid CBC Cardiac Enzymes/BNP/TSH/INR   Recent Labs   Lab Test 07/17/23  0655   CHOL 102   HDL 29*   LDL 52   TRIG 106     Recent Labs   Lab Test 07/17/23  0655 01/14/21  0505   LDL 52 79     Recent Labs   Lab Test 07/11/23  1024      POTASSIUM 3.8   CHLORIDE 105   CO2 27      BUN 13   CR 0.92   GFRESTIMATED >90   DALE 9.3     Recent Labs   Lab Test 07/11/23  1024 01/13/21  2100 12/18/19  0620   CR 0.92 0.72 0.77     No results for input(s): \"A1C\" in the last 28564 hours.       Recent Labs   Lab Test 07/11/23  1024   WBC 8.9   HGB 12.3*   HCT 37.8*   *        Recent Labs   Lab Test 07/11/23  1024 01/14/21  0515 01/13/21  2100   HGB 12.3* 11.1* 11.3*    No results for input(s): \"TROPONINI\" in the last 92761 hours.  No results for input(s): \"BNP\", \"NTBNPI\", \"NTBNP\" in the last 49812 hours.  Recent Labs   Lab Test 01/14/21  0505   TSH 3.07     Recent Labs   Lab Test 01/13/21  2100   INR 1.04        Peyton Arellano MD                                        Thank you for allowing me to participate in the care of your patient.      Sincerely,     Peyton Arellano MD     [facility]  cc:   [place] [address]      "

## 2024-10-02 PROCEDURE — 93244 EXT ECG>48HR<7D REV&INTERPJ: CPT | Performed by: INTERNAL MEDICINE

## 2024-10-04 DIAGNOSIS — I49.3 PVC'S (PREMATURE VENTRICULAR CONTRACTIONS): Primary | ICD-10-CM

## 2024-10-04 DIAGNOSIS — R06.02 SOB (SHORTNESS OF BREATH): ICD-10-CM

## 2024-10-04 RX ORDER — METOPROLOL SUCCINATE 50 MG/1
50 TABLET, EXTENDED RELEASE ORAL DAILY
Qty: 90 TABLET | Refills: 1 | Status: SHIPPED | OUTPATIENT
Start: 2024-10-04

## 2024-12-10 DIAGNOSIS — I25.111 CORONARY ARTERY DISEASE INVOLVING NATIVE CORONARY ARTERY OF NATIVE HEART WITH ANGINA PECTORIS WITH DOCUMENTED SPASM (H): ICD-10-CM

## 2024-12-16 RX ORDER — ISOSORBIDE MONONITRATE 120 MG/1
120 TABLET, EXTENDED RELEASE ORAL DAILY
Qty: 90 TABLET | Refills: 3 | Status: SHIPPED | OUTPATIENT
Start: 2024-12-16

## 2024-12-29 NOTE — PROGRESS NOTES
HEART CARE ENCOUNTER NOTE      [facility]  [phone]      Assessment/Recommendations   Assessment:   Coronary artery disease: Coronary angiogram with acetylcholine challenge in 2016 showed vasospasm. Coronary angiogram 23 showed 15 stenosis to mid LAD and 10% in distal RCA.  Negative nuclear stress test 3/22/24.  Patient is on Ranexa 500 mg twice daily as well as isosorbide mononitrate 120 mg daily.  Notes his vasospasms have been well-controlled.  Hypertension: Well-controlled on spironolactone 25 mg daily, Imdur 120 mg daily, metoprolol 50 mg daily, amlodipine 10 mg daily.  CMP 2024 stable.  Hyperlipidemia: Controlled on atorvastatin 40 mg daily.  Last lipids 23 showed LDL 52  PVCs: <1% burden seen on Zio patch 10/2024 though did seem to corrlelate with symptoms.  Patient notes on switching to metoprolol his PVCs are completely gone.  Lightheadness: Patient notes increasing lightheadedness upon standing that lasts 30 seconds. Denies any syncopal episodes.  Will trial splitting metoprolol dose to 25 mg in the morning and 25 mg in the evening.  Patient typically drinks 4 Monster energy drinks a day.  Discussed cutting out energy drinks and limiting caffeine.  If still having lightheadedness and decreased sex drive could trial metoprolol 25 mg once daily.  Obtained orthostatic vitals today that did not show orthostatic hypotension. Lyin/65, sitting 119/70, standing 112/68.     Plan:   Recommend stopping energy drinks and limiting caffeine intake  Will try splitting metoprolol succinate 50 mg to 25 mg in the morning and 25 mg in the evening.  If still having lightheadedness and decreased sex drive would recommend trialing 25 mg once daily in the morning and see if this is sufficient to control PVC symptoms.  Recommend increasing fluids to 60 ounces a day  Recommend discussing weaning off of Wellbutrin with PCP as patient was put on this for smoking cessation in  2021      Follow up in 2-3 months with me     The longitudinal plan of care for Kemal Rodriguez was addressed during this visit. Due to the added complexity in care, I will continue to support Kemal in the subsequent management of this condition(s) and with the ongoing continuity of care of this condition(s).      History of Present Illness/Subjective    HPI: Kemal Rodriguez is a 54 year old male with PMHx of minimal CAD with vasospasm, HTN, palpitations presents for follow up. Patient last saw Dr. Arellano 9/16/24 for palpitations and skipped beat feeling that was worsening. Patient underwent zio patch 10/2024 that showed <1% PVC burden but did seem to correlate with symptoms. Diltiazem was switched to metoprolol.     Patient notes since switching from diltiazem to metoprolol his PVCs are completely gone.  Patient does note an increase in lightheadedness upon standing that last for 30 seconds.  Also notes a decrease in his sex drive for the last year but has been worse lately.  Patient notes he has been dealing with a respiratory illness for the past 6 weeks.  Notes his vasospasms have been well-controlled and that it takes a lot of exertion to bring them on.  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  Notes he recently got a gym membership and plans to start in the new year.          Zio patch 10/2/24:  Indication for study: Ventricular premature beats  Time monitored: 4 days 12 hours.    Predominant rhythm: Normal sinus rhythm.  Conduction intervals are abnormal with QRS prolongation (IVCD).  No high degree AV block.  Patient recordings: Diary: None.  Triggered (no symptoms): 70, and recordings demonstrated sinus rhythm with rates ranging between 66 and 86 bpm.  The majority but not all recordings demonstrated isolated PVCs.  There were 5 recordings which demonstrated sinus rhythm alone.  Auto triggered recordings: recordings  demonstrated sinus rhythm with overall rare ventricular ectopy.     Impression:  Abnormal multiday cardiac patch monitor by virtue of the presence of a intraventricular conduction delay.  There was no evidence of high degree AV block..  Triggered recordings likely correlate with ventricular ectopy however this was not universally the case.  The burden of ventricular ectopy was low (<1%).  No sustained atrial or ventricular tachyarrhythmia.  No profound bradycardia or significant/symptomatic pauses.    Nuclear stress test 3/22/24:    The nuclear stress test is negative for inducible myocardial ischemia or infarction.    The patient is at a low risk of future cardiac ischemic events.    The left ventricular ejection fraction at stress is greater than 70%.    The stress electrocardiogram is negative for inducible ischemic EKG changes.    A prior study was conducted on 1/14/2021.  Prior images were unavailable for comparison review, however by report there is no significant change.    Coronary angiogram 7/17/23:  Minimal coronary artery disease     Echocardiogram 7/17/23:  The left ventricle is normal in size.  There is mild concentric left ventricular hypertrophy.  The visual ejection fraction is 65-70%.  No regional wall motion abnormalities noted.  Normal right ventricle size and systolic function.  The right ventricular systolic pressure is approximated at 27mmHg plus the  right atrial pressure.  IVC diameter <2.1 cm collapsing >50% with sniff suggests a normal RA pressure  of 3 mmHg.  There is no comparison study available.     Physical Examination  Review of Systems   Vitals: BP (!) 122/7 (BP Location: Right arm, Patient Position: Sitting, Cuff Size: Adult Large)   Pulse 75   Resp 16   Wt 120.7 kg (266 lb)   SpO2 95%   BMI 36.08 kg/m    BMI= Body mass index is 36.08 kg/m .  Wt Readings from Last 3 Encounters:   12/30/24 120.7 kg (266 lb)   09/16/24 116.6 kg (257 lb)   12/12/23 120.7 kg (266 lb)            ENT/Mouth: membranes moist, no oral lesions or bleeding gums.      EYES:  no scleral icterus, normal conjunctivae       Chest/Lungs:   lungs are clear to auscultation, no rales or wheezing, equal chest wall expansion    Cardiovascular:   Regular. Normal first and second heart sounds with no murmurs, rubs, or gallops; the carotid, radial and posterior tibial pulses are intact,  no edema bilaterally        Extremities: no cyanosis or clubbing   Skin: no xanthelasma, warm.    Neurologic: no tremors     Psychiatric: alert and oriented x3, calm        Please refer above for cardiac ROS details.        Medical History  Surgical History Family History Social History   Past Medical History:   Diagnosis Date    BPH (benign prostatic hyperplasia)     G6PD deficiency anemia (H)     History of blood transfusion     Hypertension     Sleep apnea     uses cpap      Past Surgical History:   Procedure Laterality Date    CV CORONARY ANGIOGRAM N/A 7/17/2023    Procedure: Coronary Angiogram;  Surgeon: Gustavo Sultana MD;  Location: [facility] CV    CV LEFT HEART CATH N/A 7/17/2023    Procedure: Left Heart Catheterization;  Surgeon: Gustavo Sultana MD;  Location: [facility] CV    HERNIA REPAIR      x3 on R inguinal  side    LAPAROSCOPIC CHOLECYSTECTOMY N/A 12/18/2019    Procedure: CHOLECYSTECTOMY, LAPAROSCOPIC;  Surgeon: Therese Crow MD;  Location: [facility] OR    REPLACEMENT TOTAL KNEE Bilateral      No family history on file.     Social History     Socioeconomic History    Marital status:      Spouse name: Not on file    Number of children: Not on file    Years of education: Not on file    Highest education level: Not on file   Occupational History    Not on file   Tobacco Use    Smoking status: Never    Smokeless tobacco: Former     Types: Chew    Tobacco comments:     2 tins/day   Vaping Use    Vaping status: Never Used   Substance and Sexual Activity    Alcohol use: Never    Drug use: Never    Sexual  activity: Not on file   Other Topics Concern    Parent/sibling w/ CABG, MI or angioplasty before 65F 55M? Not Asked   Social History Narrative    Not on file     Social Drivers of Health     Financial Resource Strain: Low Risk  (3/1/2023)    Received from [facility]    Overall Financial Resource Strain (CARDIA)     Difficulty of Paying Living Expenses: Not very hard   Food Insecurity: No Food Insecurity (3/1/2023)    Received from [facility]    Hunger Vital Sign     Worried About Running Out of Food in the Last Year: Never true     Ran Out of Food in the Last Year: Never true   Transportation Needs: No Transportation Needs (3/1/2023)    Received from [facility]    PRAPARE - Transportation     Lack of Transportation (Medical): No     Lack of Transportation (Non-Medical): No   Physical Activity: Insufficiently Active (3/1/2023)    Received from [facility]    Exercise Vital Sign     Days of Exercise per Week: 2 days     Minutes of Exercise per Session: 30 min   Stress: No Stress Concern Present (3/1/2023)    Received from [facility]    [employer] - Occupational Stress Questionnaire     Feeling of Stress : Not at all   Social Connections: Unknown (7/31/2024)    Received from [facility]    Social Network     Social Network: Not on file   Interpersonal Safety: Not At Risk (7/31/2024)    Received from [facility]    HITS     Over the last 12 months how often did your partner physically hurt you?: Never     Over the last 12 months how often did your partner insult you or talk down to you?: Never     Over the last 12 months how often did your partner threaten you with physical harm?: Never     Over the last 12 months how often did your partner scream or curse at you?: Never   Housing Stability: Low Risk  (3/1/2023)    Received from [facility]    Housing Stability Vital Sign     Unable to Pay for Housing in the Last Year: No     Number of Places Lived in the Last Year: 1     In the  last 12 months, was there a time when you did not have a steady place to sleep or slept in a shelter (including now)?: No           Medications  Allergies   Current Outpatient Medications   Medication Sig Dispense Refill    amLODIPine (NORVASC) 10 MG tablet Take 10 mg by mouth daily      arginine 1000 MG tablet Take 3,000 mg by mouth 3 times daily (Patient not taking: Reported on 12/12/2023)      aspirin 81 MG EC tablet Take 81 mg by mouth At Bedtime      atorvastatin (LIPITOR) 40 MG tablet Take 40 mg by mouth At Bedtime       buPROPion (WELLBUTRIN XL) 300 MG 24 hr tablet Take 300 mg by mouth every morning      cyclobenzaprine (FLEXERIL) 10 MG tablet Take 10 mg by mouth 3 times daily as needed      diclofenac (VOLTAREN) 1 % topical gel Apply 2 g topically as needed for moderate pain.      isosorbide mononitrate CR (IMDUR) 120 MG 24 HR ER tablet Take 1 tablet (120 mg) by mouth daily 90 tablet 3    Melatonin 10 MG TABS tablet Take 10 mg by mouth nightly as needed for sleep      metoprolol succinate ER (TOPROL XL) 50 MG 24 hr tablet Take 1 tablet (50 mg) by mouth daily. 90 tablet 1    Multiple Vitamin (MULTI-VITAMINS) TABS Take 1 tablet by mouth daily       nicotine (NICORETTE) 4 MG lozenge Place 4 mg inside cheek every hour as needed      nitroGLYcerin (NITROSTAT) 0.4 MG sublingual tablet For chest pain place 1 tablet under the tongue every 5 minutes for 3 doses. If symptoms persist 5 minutes after 1st dose call 911. 25 tablet 4    omega 3 1000 MG CAPS Take 1 g by mouth 2 times daily       ranolazine (RANEXA) 500 MG 12 hr tablet Take 1 tablet (500 mg) by mouth 2 times daily 180 tablet 3    SODIUM FLUORIDE 5000 PLUS 1.1 % CREA APPLY A SMALL AMOUNT TO TOOHBRUSH AND BRUSH TEETH AT BEDTIME DO NOT DRINK OR EAT FOR 30 MINUTES AFTER USE      spironolactone (ALDACTONE) 25 MG tablet Take 25 mg by mouth daily.      tamsulosin (FLOMAX) 0.4 MG capsule Take 0.4 mg by mouth daily      traZODone (DESYREL) 50 MG tablet Take 50 mg by  "mouth nightly as needed for sleep.      vitamin C (ASCORBIC ACID) 500 MG tablet Take 500 mg by mouth daily      vitamin D3 (CHOLECALCIFEROL) 50 mcg (2000 units) tablet Take 1 tablet by mouth daily         Allergies   Allergen Reactions    Carbamazepine      Other Reaction(s): Confusion    Quinine GI Disturbance and Nausea and Vomiting     Nausea & Vomiting      Aspirin      PN: LW Reaction: Blood disorder      Hydrocodone GI Disturbance    Pcn [Penicillins]     Sulfa Antibiotics           Lab Results    Chemistry/lipid CBC Cardiac Enzymes/BNP/TSH/INR   Recent Labs   Lab Test 07/17/23  0655   CHOL 102   HDL 29*   LDL 52   TRIG 106     Recent Labs   Lab Test 07/17/23  0655 01/14/21  0505   LDL 52 79     Recent Labs   Lab Test 07/11/23  1024      POTASSIUM 3.8   CHLORIDE 105   CO2 27      BUN 13   CR 0.92   GFRESTIMATED >90   DALE 9.3     Recent Labs   Lab Test 07/11/23  1024 01/13/21  2100 12/18/19  0620   CR 0.92 0.72 0.77     No results for input(s): \"A1C\" in the last 19544 hours.       Recent Labs   Lab Test 07/11/23  1024   WBC 8.9   HGB 12.3*   HCT 37.8*   *        Recent Labs   Lab Test 07/11/23  1024 01/14/21  0515 01/13/21  2100   HGB 12.3* 11.1* 11.3*    No results for input(s): \"TROPONINI\" in the last 44512 hours.  No results for input(s): \"BNP\", \"NTBNPI\", \"NTBNP\" in the last 59602 hours.  Recent Labs   Lab Test 01/14/21  0505   TSH 3.07     Recent Labs   Lab Test 01/13/21  2100   INR 1.04          Bhakti Bradley PA-C                                       "

## 2024-12-30 ENCOUNTER — OFFICE VISIT (OUTPATIENT)
Dept: CARDIOLOGY | Facility: CLINIC | Age: 54
End: 2024-12-30
Payer: COMMERCIAL

## 2024-12-30 VITALS
WEIGHT: 266 LBS | SYSTOLIC BLOOD PRESSURE: 122 MMHG | HEART RATE: 75 BPM | BODY MASS INDEX: 36.08 KG/M2 | RESPIRATION RATE: 16 BRPM | OXYGEN SATURATION: 95 % | DIASTOLIC BLOOD PRESSURE: 7 MMHG

## 2024-12-30 DIAGNOSIS — I10 ESSENTIAL HYPERTENSION: Primary | ICD-10-CM

## 2024-12-30 DIAGNOSIS — R42 LIGHTHEADEDNESS: ICD-10-CM

## 2024-12-30 DIAGNOSIS — I49.3 PVC'S (PREMATURE VENTRICULAR CONTRACTIONS): ICD-10-CM

## 2024-12-30 PROCEDURE — 99204 OFFICE O/P NEW MOD 45 MIN: CPT | Performed by: STUDENT IN AN ORGANIZED HEALTH CARE EDUCATION/TRAINING PROGRAM

## 2024-12-30 PROCEDURE — G2211 COMPLEX E/M VISIT ADD ON: HCPCS | Performed by: STUDENT IN AN ORGANIZED HEALTH CARE EDUCATION/TRAINING PROGRAM

## 2024-12-30 NOTE — PATIENT INSTRUCTIONS
Kemal Rodriguez,    It was a pleasure to see you today at the [facility].     My recommendations after this visit include:    - Recommend stopping energy drinks and limiting caffeine  - Can trial first splitting metoprolol and take 25 mg in the morning and 25 mg in the evening. If PVC's still well controlled but still having lightheadedness and decreased drive recommend trying 25 mg once daily  - Increase fluids- 60 oz a day  - Recommend discussing with PCP weaning off of Wellbutrin  - Follow up with me in 2-3 months    - Please call [phone], if you have any questions or concerns      Bhakti Bradley PA-C

## 2024-12-30 NOTE — LETTER
2024    Juanita Sorenson, CNP  [address] [facility] [address]    RE: Kemal Rodriguez       Dear Colleague,     I had the pleasure of seeing Kemal Rodriguez in the [facility].    HEART CARE ENCOUNTER NOTE      [facility]  [phone]      Assessment/Recommendations   Assessment:   Coronary artery disease: Coronary angiogram with acetylcholine challenge in  showed vasospasm. Coronary angiogram 23 showed 15 stenosis to mid LAD and 10% in distal RCA.  Negative nuclear stress test 3/22/24.  Patient is on Ranexa 500 mg twice daily as well as isosorbide mononitrate 120 mg daily.  Notes his vasospasms have been well-controlled.  Hypertension: Well-controlled on spironolactone 25 mg daily, Imdur 120 mg daily, metoprolol 50 mg daily, amlodipine 10 mg daily.  CMP 2024 stable.  Hyperlipidemia: Controlled on atorvastatin 40 mg daily.  Last lipids 23 showed LDL 52  PVCs: <1% burden seen on Zio patch 10/2024 though did seem to corrlelate with symptoms.  Patient notes on switching to metoprolol his PVCs are completely gone.  Lightheadness: Patient notes increasing lightheadedness upon standing that lasts 30 seconds. Denies any syncopal episodes.  Will trial splitting metoprolol dose to 25 mg in the morning and 25 mg in the evening.  Patient typically drinks 4 Monster energy drinks a day.  Discussed cutting out energy drinks and limiting caffeine.  If still having lightheadedness and decreased sex drive could trial metoprolol 25 mg once daily.  Obtained orthostatic vitals today that did not show orthostatic hypotension. Lyin/65, sitting 119/70, standing 112/68.     Plan:   Recommend stopping energy drinks and limiting caffeine intake  Will try splitting metoprolol succinate 50 mg to 25 mg in the morning and 25 mg in the evening.  If still having lightheadedness and decreased sex drive would recommend trialing 25 mg once daily in the morning and  see if this is sufficient to control PVC symptoms.  Recommend increasing fluids to 60 ounces a day  Recommend discussing weaning off of Wellbutrin with PCP as patient was put on this for smoking cessation in 2021      Follow up in 2-3 months with me     The longitudinal plan of care for Kemal Rodriguez was addressed during this visit. Due to the added complexity in care, I will continue to support Kemal in the subsequent management of this condition(s) and with the ongoing continuity of care of this condition(s).      History of Present Illness/Subjective    HPI: Kemal Rodriguez is a 54 year old male with PMHx of minimal CAD with vasospasm, HTN, palpitations presents for follow up. Patient last saw Dr. Arellano 9/16/24 for palpitations and skipped beat feeling that was worsening. Patient underwent zio patch 10/2024 that showed <1% PVC burden but did seem to correlate with symptoms. Diltiazem was switched to metoprolol.     Patient notes since switching from diltiazem to metoprolol his PVCs are completely gone.  Patient does note an increase in lightheadedness upon standing that last for 30 seconds.  Also notes a decrease in his sex drive for the last year but has been worse lately.  Patient notes he has been dealing with a respiratory illness for the past 6 weeks.  Notes his vasospasms have been well-controlled and that it takes a lot of exertion to bring them on.  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  Notes he recently got a gym membership and plans to start in the new year.          Zio patch 10/2/24:  Indication for study: Ventricular premature beats  Time monitored: 4 days 12 hours.    Predominant rhythm: Normal sinus rhythm.  Conduction intervals are abnormal with QRS prolongation (IVCD).  No high degree AV block.  Patient recordings: Diary: None.  Triggered (no symptoms): 70, and recordings demonstrated sinus rhythm  with rates ranging between 66 and 86 bpm.  The majority but not all recordings demonstrated isolated PVCs.  There were 5 recordings which demonstrated sinus rhythm alone.  Auto triggered recordings: recordings demonstrated sinus rhythm with overall rare ventricular ectopy.     Impression:  Abnormal multiday cardiac patch monitor by virtue of the presence of a intraventricular conduction delay.  There was no evidence of high degree AV block..  Triggered recordings likely correlate with ventricular ectopy however this was not universally the case.  The burden of ventricular ectopy was low (<1%).  No sustained atrial or ventricular tachyarrhythmia.  No profound bradycardia or significant/symptomatic pauses.    Nuclear stress test 3/22/24:     The nuclear stress test is negative for inducible myocardial ischemia or infarction.     The patient is at a low risk of future cardiac ischemic events.     The left ventricular ejection fraction at stress is greater than 70%.     The stress electrocardiogram is negative for inducible ischemic EKG changes.     A prior study was conducted on 1/14/2021.  Prior images were unavailable for comparison review, however by report there is no significant change.    Coronary angiogram 7/17/23:  Minimal coronary artery disease     Echocardiogram 7/17/23:  The left ventricle is normal in size.  There is mild concentric left ventricular hypertrophy.  The visual ejection fraction is 65-70%.  No regional wall motion abnormalities noted.  Normal right ventricle size and systolic function.  The right ventricular systolic pressure is approximated at 27mmHg plus the  right atrial pressure.  IVC diameter <2.1 cm collapsing >50% with sniff suggests a normal RA pressure  of 3 mmHg.  There is no comparison study available.     Physical Examination  Review of Systems   Vitals: BP (!) 122/7 (BP Location: Right arm, Patient Position: Sitting, Cuff Size: Adult Large)   Pulse 75   Resp 16   Wt 120.7 kg  (266 lb)   SpO2 95%   BMI 36.08 kg/m    BMI= Body mass index is 36.08 kg/m .  Wt Readings from Last 3 Encounters:   12/30/24 120.7 kg (266 lb)   09/16/24 116.6 kg (257 lb)   12/12/23 120.7 kg (266 lb)           ENT/Mouth: membranes moist, no oral lesions or bleeding gums.      EYES:  no scleral icterus, normal conjunctivae       Chest/Lungs:   lungs are clear to auscultation, no rales or wheezing, equal chest wall expansion    Cardiovascular:   Regular. Normal first and second heart sounds with no murmurs, rubs, or gallops; the carotid, radial and posterior tibial pulses are intact,  no edema bilaterally        Extremities: no cyanosis or clubbing   Skin: no xanthelasma, warm.    Neurologic: no tremors     Psychiatric: alert and oriented x3, calm        Please refer above for cardiac ROS details.        Medical History  Surgical History Family History Social History   Past Medical History:   Diagnosis Date     BPH (benign prostatic hyperplasia)      G6PD deficiency anemia (H)      History of blood transfusion      Hypertension      Sleep apnea     uses cpap      Past Surgical History:   Procedure Laterality Date     CV CORONARY ANGIOGRAM N/A 7/17/2023    Procedure: Coronary Angiogram;  Surgeon: Gustavo Sultana MD;  Location: [facility] CV     CV LEFT HEART CATH N/A 7/17/2023    Procedure: Left Heart Catheterization;  Surgeon: Gustvao Sultana MD;  Location: [facility] CV     HERNIA REPAIR      x3 on R inguinal  side     LAPAROSCOPIC CHOLECYSTECTOMY N/A 12/18/2019    Procedure: CHOLECYSTECTOMY, LAPAROSCOPIC;  Surgeon: Therese Crow MD;  Location: [facility] OR     REPLACEMENT TOTAL KNEE Bilateral      No family history on file.     Social History     Socioeconomic History     Marital status:      Spouse name: Not on file     Number of children: Not on file     Years of education: Not on file     Highest education level: Not on file   Occupational History     Not on file   Tobacco Use      Smoking status: Never     Smokeless tobacco: Former     Types: Chew     Tobacco comments:     2 tins/day   Vaping Use     Vaping status: Never Used   Substance and Sexual Activity     Alcohol use: Never     Drug use: Never     Sexual activity: Not on file   Other Topics Concern     Parent/sibling w/ CABG, MI or angioplasty before 65F 55M? Not Asked   Social History Narrative     Not on file     Social Drivers of Health     Financial Resource Strain: Low Risk  (3/1/2023)    Received from [facility]    Overall Financial Resource Strain (CARDIA)      Difficulty of Paying Living Expenses: Not very hard   Food Insecurity: No Food Insecurity (3/1/2023)    Received from [facility]    Hunger Vital Sign      Worried About Running Out of Food in the Last Year: Never true      Ran Out of Food in the Last Year: Never true   Transportation Needs: No Transportation Needs (3/1/2023)    Received from [facility]    PRAPARE - Transportation      Lack of Transportation (Medical): No      Lack of Transportation (Non-Medical): No   Physical Activity: Insufficiently Active (3/1/2023)    Received from [facility]    Exercise Vital Sign      Days of Exercise per Week: 2 days      Minutes of Exercise per Session: 30 min   Stress: No Stress Concern Present (3/1/2023)    Received from [facility]    [employer] - Occupational Stress Questionnaire      Feeling of Stress : Not at all   Social Connections: Unknown (7/31/2024)    Received from [facility]    Social Network      Social Network: Not on file   Interpersonal Safety: Not At Risk (7/31/2024)    Received from [facility]    HITS      Over the last 12 months how often did your partner physically hurt you?: Never      Over the last 12 months how often did your partner insult you or talk down to you?: Never      Over the last 12 months how often did your partner threaten you with physical harm?: Never      Over the last 12 months how often did your  partner scream or curse at you?: Never   Housing Stability: Low Risk  (3/1/2023)    Received from [facility]    Housing Stability Vital Sign      Unable to Pay for Housing in the Last Year: No      Number of Places Lived in the Last Year: 1      In the last 12 months, was there a time when you did not have a steady place to sleep or slept in a shelter (including now)?: No           Medications  Allergies   Current Outpatient Medications   Medication Sig Dispense Refill     amLODIPine (NORVASC) 10 MG tablet Take 10 mg by mouth daily       arginine 1000 MG tablet Take 3,000 mg by mouth 3 times daily (Patient not taking: Reported on 12/12/2023)       aspirin 81 MG EC tablet Take 81 mg by mouth At Bedtime       atorvastatin (LIPITOR) 40 MG tablet Take 40 mg by mouth At Bedtime        buPROPion (WELLBUTRIN XL) 300 MG 24 hr tablet Take 300 mg by mouth every morning       cyclobenzaprine (FLEXERIL) 10 MG tablet Take 10 mg by mouth 3 times daily as needed       diclofenac (VOLTAREN) 1 % topical gel Apply 2 g topically as needed for moderate pain.       isosorbide mononitrate CR (IMDUR) 120 MG 24 HR ER tablet Take 1 tablet (120 mg) by mouth daily 90 tablet 3     Melatonin 10 MG TABS tablet Take 10 mg by mouth nightly as needed for sleep       metoprolol succinate ER (TOPROL XL) 50 MG 24 hr tablet Take 1 tablet (50 mg) by mouth daily. 90 tablet 1     Multiple Vitamin (MULTI-VITAMINS) TABS Take 1 tablet by mouth daily        nicotine (NICORETTE) 4 MG lozenge Place 4 mg inside cheek every hour as needed       nitroGLYcerin (NITROSTAT) 0.4 MG sublingual tablet For chest pain place 1 tablet under the tongue every 5 minutes for 3 doses. If symptoms persist 5 minutes after 1st dose call 911. 25 tablet 4     omega 3 1000 MG CAPS Take 1 g by mouth 2 times daily        ranolazine (RANEXA) 500 MG 12 hr tablet Take 1 tablet (500 mg) by mouth 2 times daily 180 tablet 3     SODIUM FLUORIDE 5000 PLUS 1.1 % CREA APPLY A SMALL AMOUNT TO  "TOOHBRUSH AND BRUSH TEETH AT BEDTIME DO NOT DRINK OR EAT FOR 30 MINUTES AFTER USE       spironolactone (ALDACTONE) 25 MG tablet Take 25 mg by mouth daily.       tamsulosin (FLOMAX) 0.4 MG capsule Take 0.4 mg by mouth daily       traZODone (DESYREL) 50 MG tablet Take 50 mg by mouth nightly as needed for sleep.       vitamin C (ASCORBIC ACID) 500 MG tablet Take 500 mg by mouth daily       vitamin D3 (CHOLECALCIFEROL) 50 mcg (2000 units) tablet Take 1 tablet by mouth daily         Allergies   Allergen Reactions     Carbamazepine      Other Reaction(s): Confusion     Quinine GI Disturbance and Nausea and Vomiting     Nausea & Vomiting       Aspirin      PN: LW Reaction: Blood disorder       Hydrocodone GI Disturbance     Pcn [Penicillins]      Sulfa Antibiotics           Lab Results    Chemistry/lipid CBC Cardiac Enzymes/BNP/TSH/INR   Recent Labs   Lab Test 07/17/23  0655   CHOL 102   HDL 29*   LDL 52   TRIG 106     Recent Labs   Lab Test 07/17/23  0655 01/14/21  0505   LDL 52 79     Recent Labs   Lab Test 07/11/23  1024      POTASSIUM 3.8   CHLORIDE 105   CO2 27      BUN 13   CR 0.92   GFRESTIMATED >90   DALE 9.3     Recent Labs   Lab Test 07/11/23  1024 01/13/21  2100 12/18/19  0620   CR 0.92 0.72 0.77     No results for input(s): \"A1C\" in the last 77792 hours.       Recent Labs   Lab Test 07/11/23  1024   WBC 8.9   HGB 12.3*   HCT 37.8*   *        Recent Labs   Lab Test 07/11/23  1024 01/14/21  0515 01/13/21  2100   HGB 12.3* 11.1* 11.3*    No results for input(s): \"TROPONINI\" in the last 25709 hours.  No results for input(s): \"BNP\", \"NTBNPI\", \"NTBNP\" in the last 38018 hours.  Recent Labs   Lab Test 01/14/21  0505   TSH 3.07     Recent Labs   Lab Test 01/13/21  2100   INR 1.04          Bhakti Bradley PA-C                                         Thank you for allowing me to participate in the care of your patient.      Sincerely,     Bhakti Bradley PA-C     [facility]" [facility]  cc:   Peyton Arellano MD  [address]  [address]  [place] [address]

## 2025-01-21 ENCOUNTER — TELEPHONE (OUTPATIENT)
Dept: CARDIOLOGY | Facility: CLINIC | Age: 55
End: 2025-01-21
Payer: COMMERCIAL

## 2025-01-21 DIAGNOSIS — I10 ESSENTIAL HYPERTENSION: Primary | ICD-10-CM

## 2025-01-21 DIAGNOSIS — R06.02 SOB (SHORTNESS OF BREATH): ICD-10-CM

## 2025-01-21 DIAGNOSIS — I49.3 PVC'S (PREMATURE VENTRICULAR CONTRACTIONS): ICD-10-CM

## 2025-01-21 RX ORDER — SPIRONOLACTONE 25 MG/1
25 TABLET ORAL DAILY
Qty: 90 TABLET | Refills: 2 | Status: SHIPPED | OUTPATIENT
Start: 2025-01-21

## 2025-01-21 RX ORDER — METOPROLOL SUCCINATE 50 MG/1
25 TABLET, EXTENDED RELEASE ORAL 2 TIMES DAILY
Qty: 90 TABLET | Refills: 2 | Status: SHIPPED | OUTPATIENT
Start: 2025-01-21

## 2025-01-21 NOTE — TELEPHONE ENCOUNTER
[facility] Call Center    Phone Message    May a detailed message be left on voicemail: yes     Reason for Call: Medication Refill Request    Has the patient contacted the pharmacy for the refill? Yes   Name of medication being requested: spironolactone (ALDACTONE)  and Metoprolol  Provider who prescribed the medication: Dr. Arellano  Pharmacy:    [id number] [place] [address] [facility] AT [facility] [address]   Date medication is needed: 1/27/25   Pt has about a week's worth of the medications remaining.    Action Taken: Other: Cardiology    Travel Screening: Not Applicable     Thank you!  [facility]

## 2025-01-21 NOTE — TELEPHONE ENCOUNTER
"Noted per BRYANNA's 12-30-24 visit note:  Will try splitting metoprolol succinate 50 mg to 25 mg in the morning and 25 mg in the evening.  If still having lightheadedness and decreased sex drive would recommend trialing 25 mg once daily in the morning and see if this is sufficient to control PVC symptoms.    Return call to patient to confirm Metoprolol Succ dose he is presently taking -0 patient confirmed he is taking \"25 mg in the morning and 25 mg in the evening\" and follow-up sched on 3-25-25.    Informed patient that Rx's would be refilled as requested.  mg    "

## 2025-02-22 ENCOUNTER — TRANSFERRED RECORDS (OUTPATIENT)
Dept: HEALTH INFORMATION MANAGEMENT | Facility: CLINIC | Age: 55
End: 2025-02-22
Payer: COMMERCIAL

## 2025-02-25 DIAGNOSIS — I25.111 CORONARY ARTERY DISEASE INVOLVING NATIVE CORONARY ARTERY OF NATIVE HEART WITH ANGINA PECTORIS WITH DOCUMENTED SPASM: ICD-10-CM

## 2025-02-25 RX ORDER — RANOLAZINE 500 MG/1
500 TABLET, EXTENDED RELEASE ORAL 2 TIMES DAILY
Qty: 180 TABLET | Refills: 0 | Status: SHIPPED | OUTPATIENT
Start: 2025-02-25

## 2025-03-24 NOTE — PROGRESS NOTES
HEART CARE ENCOUNTER NOTE      [facility]  [phone]      Assessment/Recommendations   Assessment:   Coronary artery disease: Coronary angiogram with acetylcholine challenge in 2016 showed vasospasm. Coronary angiogram 7/17/23 showed 15% stenosis to mid LAD and 10% in distal RCA.  Negative nuclear stress test 3/22/24.  Patient is on Ranexa 500 mg twice daily as well as isosorbide mononitrate 120 mg daily.  Notes his vasospasms have been well-controlled.  Hypertension: Well-controlled on spironolactone 25 mg daily, Imdur 120 mg daily, metoprolol 50 mg daily, amlodipine 10 mg daily.  BMP 2/22/2025 stable  Hyperlipidemia: Controlled on atorvastatin 40 mg daily.  Last lipids 7/17/23 showed LDL 52.  Patient will update this through PCP.  PVCs: <1% burden seen on Zio patch 10/2024 though did seem to corrlelate with symptoms.  Patient notes since switching to metoprolol his PVCs are completely gone.  Patient does have side effect with metoprolol of decreased sex drive.  Will trial cutting back metoprolol to 25 mg once daily, but if PVCs come back we will increase this again to twice daily.        Plan:   Continue current medications  Can trial cutting metoprolol succinate to 25 mg once daily instead of twice daily to see if this improves sex drive while still controlling PVCs  Patient will start monitoring blood pressure at home given the lower reading today and some mild lightheadedness      Follow up in 6 to 12 months with me or Dr. Arellano     History of Present Illness/Subjective    HPI: Kemal Rodriguez is a 54 year old male with PMHx of minimal CAD with vasospasm, HTN, palpitations presents for follow up. Patient last saw Dr. Arellano 9/16/24 for palpitations and skipped beat feeling that was worsening. Patient underwent zio patch 10/2024 that showed <1% PVC burden but did seem to correlate with symptoms. Diltiazem was switched to metoprolol which improved symptoms.  I last saw patient 12/30/2024  where patient was noting a decrease in sex drive and we had tried splitting his metoprolol to 25 mg twice daily.  Patient's vasospasms have been well-controlled.  We had discussed stopping energy drinks and limiting caffeine as well as increasing fluids.    Patient presented to the ED 2/22/2025 with chest pain that started a day prior to arrival while at rest.  His nitroglycerin was ineffective that day for symptoms.  EKG without ischemia, normal troponin and D-dimer.  Had been recommended to start Prilosec 40 mg daily and follow-up with primary care.  Patient notes since starting Prilosec symptoms work on that night and have not recurred.  Patient notes it felt different than his vasospasms and does not think it was GERD.  Patient notes lightheadedness improved since splitting his metoprolol dose in the morning and evening.  However patient still notes decreased sex drive.  Patient notes his PVCs are well-controlled.  Patient started going to the gym and walking on a treadmill.  Notes his vasospasms are well-controlled unless he is pushing himself going up an incline.  Patient denies dyspnea, significant lower extremity edema.  Of note patient has hand surgery coming up tomorrow and another procedure with conscious sedation 2 days later.        Zio patch 10/2/24:  Indication for study: Ventricular premature beats  Time monitored: 4 days 12 hours.    Predominant rhythm: Normal sinus rhythm.  Conduction intervals are abnormal with QRS prolongation (IVCD).  No high degree AV block.  Patient recordings: Diary: None.  Triggered (no symptoms): 70, and recordings demonstrated sinus rhythm with rates ranging between 66 and 86 bpm.  The majority but not all recordings demonstrated isolated PVCs.  There were 5 recordings which demonstrated sinus rhythm alone.  Auto triggered recordings: recordings demonstrated sinus rhythm with overall rare ventricular ectopy.     Impression:  Abnormal multiday cardiac patch monitor by  virtue of the presence of a intraventricular conduction delay.  There was no evidence of high degree AV block..  Triggered recordings likely correlate with ventricular ectopy however this was not universally the case.  The burden of ventricular ectopy was low (<1%).  No sustained atrial or ventricular tachyarrhythmia.  No profound bradycardia or significant/symptomatic pauses.    Nuclear stress test 3/22/24:    The nuclear stress test is negative for inducible myocardial ischemia or infarction.    The patient is at a low risk of future cardiac ischemic events.    The left ventricular ejection fraction at stress is greater than 70%.    The stress electrocardiogram is negative for inducible ischemic EKG changes.    A prior study was conducted on 1/14/2021.  Prior images were unavailable for comparison review, however by report there is no significant change.    Coronary angiogram 7/17/23:  Minimal coronary artery disease     Echocardiogram 7/17/23:  The left ventricle is normal in size.  There is mild concentric left ventricular hypertrophy.  The visual ejection fraction is 65-70%.  No regional wall motion abnormalities noted.  Normal right ventricle size and systolic function.  The right ventricular systolic pressure is approximated at 27mmHg plus the  right atrial pressure.  IVC diameter <2.1 cm collapsing >50% with sniff suggests a normal RA pressure  of 3 mmHg.  There is no comparison study available.     Physical Examination  Review of Systems   Vitals: /54 (BP Location: Left arm, Patient Position: Sitting, Cuff Size: Adult Large)   Pulse 64   Resp 14   Wt 119.7 kg (264 lb)   BMI 35.80 kg/m    BMI= Body mass index is 35.8 kg/m .  Wt Readings from Last 3 Encounters:   03/25/25 119.7 kg (264 lb)   12/30/24 120.7 kg (266 lb)   09/16/24 116.6 kg (257 lb)           ENT/Mouth: membranes moist, no oral lesions or bleeding gums.      EYES:  no scleral icterus, normal conjunctivae       Chest/Lungs:   lungs are  clear to auscultation, no rales or wheezing, equal chest wall expansion    Cardiovascular:   Regular. Normal first and second heart sounds with no murmurs, rubs, or gallops; the carotid, radial and posterior tibial pulses are intact,  no edema bilaterally        Extremities: no cyanosis or clubbing   Skin: no xanthelasma, warm.    Neurologic: no tremors     Psychiatric: alert and oriented x3, calm        Please refer above for cardiac ROS details.        Medical History  Surgical History Family History Social History   Past Medical History:   Diagnosis Date    BPH (benign prostatic hyperplasia)     G6PD deficiency anemia     History of blood transfusion     Hypertension     Sleep apnea     uses cpap      Past Surgical History:   Procedure Laterality Date    CV CORONARY ANGIOGRAM N/A 7/17/2023    Procedure: Coronary Angiogram;  Surgeon: Gustavo Sultana MD;  Location: [facility] CATH LAB CV    CV LEFT HEART CATH N/A 7/17/2023    Procedure: Left Heart Catheterization;  Surgeon: Gustavo Sultana MD;  Location: [facility] CV    HERNIA REPAIR      x3 on R inguinal  side    LAPAROSCOPIC CHOLECYSTECTOMY N/A 12/18/2019    Procedure: CHOLECYSTECTOMY, LAPAROSCOPIC;  Surgeon: Therese Crow MD;  Location: [facility] OR    REPLACEMENT TOTAL KNEE Bilateral      No family history on file.     Social History     Socioeconomic History    Marital status:      Spouse name: Not on file    Number of children: Not on file    Years of education: Not on file    Highest education level: Not on file   Occupational History    Not on file   Tobacco Use    Smoking status: Never    Smokeless tobacco: Former     Types: Chew    Tobacco comments:     2 tins/day   Vaping Use    Vaping status: Never Used   Substance and Sexual Activity    Alcohol use: Never    Drug use: Never    Sexual activity: Not on file   Other Topics Concern    Parent/sibling w/ CABG, MI or angioplasty before 65F 55M? Not Asked   Social History Narrative    Not on  file     Social Drivers of Health     Financial Resource Strain: Low Risk  (3/1/2023)    Received from [facility]    Overall Financial Resource Strain (CARDIA)     Difficulty of Paying Living Expenses: Not very hard   Food Insecurity: No Food Insecurity (3/1/2023)    Received from [facility]    Hunger Vital Sign     Worried About Running Out of Food in the Last Year: Never true     Ran Out of Food in the Last Year: Never true   Transportation Needs: No Transportation Needs (3/1/2023)    Received from [facility]    PRAPARE - Transportation     Lack of Transportation (Medical): No     Lack of Transportation (Non-Medical): No   Physical Activity: Insufficiently Active (3/1/2023)    Received from [facility]    Exercise Vital Sign     Days of Exercise per Week: 2 days     Minutes of Exercise per Session: 30 min   Stress: No Stress Concern Present (3/1/2023)    Received from [facility]    [employer] [place] [employer] - Occupational Stress Questionnaire     Feeling of Stress : Not at all   Social Connections: Unknown (7/31/2024)    Received from [employer] [facility] [employer] [facility]    Social [place]     Social Network: Not on file   Interpersonal Safety: Patient Declined (2/22/2025)    Received from [employer]    Humiliation, Afraid, Rape, and Kick questionnaire     Fear of Current or Ex-Partner: Patient declined     Emotionally Abused: Patient declined     Physically Abused: Patient declined     Sexually Abused: Patient declined   Housing Stability: Low Risk  (3/1/2023)    Received from [facility]    Housing Stability Vital Sign     Unable to Pay for Housing in the Last Year: No     Number of Places Lived in the Last Year: 1     In the last 12 months, was there a time when you did not have a steady place to sleep or slept in a shelter (including now)?: No           Medications  Allergies   Current Outpatient Medications   Medication Sig Dispense Refill    albuterol (PROAIR HFA/PROVENTIL HFA/VENTOLIN HFA) 108 (90 Base)  MCG/ACT inhaler Inhale 2 puffs into the lungs every 6 hours as needed.      amLODIPine (NORVASC) 10 MG tablet Take 10 mg by mouth daily      aspirin 81 MG EC tablet Take 81 mg by mouth At Bedtime      atorvastatin (LIPITOR) 40 MG tablet Take 40 mg by mouth At Bedtime       buPROPion (WELLBUTRIN XL) 300 MG 24 hr tablet Take 300 mg by mouth every morning      cyclobenzaprine (FLEXERIL) 10 MG tablet Take 10 mg by mouth 3 times daily as needed      hydrOXYzine HCl (ATARAX) 25 MG tablet Take 25 mg by mouth every 8 hours as needed.      isosorbide mononitrate CR (IMDUR) 120 MG 24 HR ER tablet Take 1 tablet (120 mg) by mouth daily 90 tablet 3    metoprolol succinate ER (TOPROL XL) 50 MG 24 hr tablet Take 0.5 tablets (25 mg) by mouth 2 times daily. 90 tablet 2    Multiple Vitamin (MULTI-VITAMINS) TABS Take 1 tablet by mouth daily       nicotine (NICORETTE) 4 MG lozenge Place 4 mg inside cheek every hour as needed      nitroGLYcerin (NITROSTAT) 0.4 MG sublingual tablet For chest pain place 1 tablet under the tongue every 5 minutes for 3 doses. If symptoms persist 5 minutes after 1st dose call 911. (Patient taking differently: 0.4 mg every 5 minutes as needed. For chest pain place 1 tablet under the tongue every 5 minutes for 3 doses. If symptoms persist 5 minutes after 1st dose call 911.) 25 tablet 4    omega 3 1000 MG CAPS Take 1 g by mouth 2 times daily       oxyCODONE (ROXICODONE) 5 MG tablet Take 5 mg by mouth every 6 hours as needed.      ranolazine (RANEXA) 500 MG 12 hr tablet Take 1 tablet (500 mg) by mouth 2 times daily 180 tablet 0    SODIUM FLUORIDE 5000 PLUS 1.1 % CREA APPLY A SMALL AMOUNT TO TOOHBRUSH AND BRUSH TEETH AT BEDTIME DO NOT DRINK OR EAT FOR 30 MINUTES AFTER USE      spironolactone (ALDACTONE) 25 MG tablet Take 1 tablet (25 mg) by mouth daily. 90 tablet 2    terazosin (HYTRIN) 1 MG capsule Take 1 mg by mouth at bedtime.      traZODone (DESYREL) 50 MG tablet Take 50 mg by mouth nightly as needed for  "sleep.      vitamin C (ASCORBIC ACID) 500 MG tablet Take 500 mg by mouth daily      vitamin D3 (CHOLECALCIFEROL) 50 mcg (2000 units) tablet Take 1 tablet by mouth daily      diclofenac (VOLTAREN) 1 % topical gel Apply 2 g topically as needed for moderate pain. (Patient not taking: Reported on 3/25/2025)         Allergies   Allergen Reactions    Carbamazepine      Other Reaction(s): Confusion    Quinine GI Disturbance and Nausea and Vomiting     Nausea & Vomiting      Aspirin      PN: LW Reaction: Blood disorder      Hydrocodone GI Disturbance    Pcn [Penicillins]     Sulfa Antibiotics           Lab Results    Chemistry/lipid CBC Cardiac Enzymes/BNP/TSH/INR   Recent Labs   Lab Test 07/17/23  0655   CHOL 102   HDL 29*   LDL 52   TRIG 106     Recent Labs   Lab Test 07/17/23  0655 01/14/21  0505   LDL 52 79     Recent Labs   Lab Test 07/11/23  1024      POTASSIUM 3.8   CHLORIDE 105   CO2 27      BUN 13   CR 0.92   GFRESTIMATED >90   DALE 9.3     Recent Labs   Lab Test 07/11/23  1024 01/13/21  2100 12/18/19  0620   CR 0.92 0.72 0.77     No results for input(s): \"A1C\" in the last 90142 hours.       Recent Labs   Lab Test 07/11/23  1024   WBC 8.9   HGB 12.3*   HCT 37.8*   *        Recent Labs   Lab Test 07/11/23  1024 01/14/21  0515 01/13/21  2100   HGB 12.3* 11.1* 11.3*    No results for input(s): \"TROPONINI\" in the last 77661 hours.  No results for input(s): \"BNP\", \"NTBNPI\", \"NTBNP\" in the last 31243 hours.  Recent Labs   Lab Test 01/14/21  0505   TSH 3.07     Recent Labs   Lab Test 01/13/21  2100   INR 1.04          Bhakti Bradley PA-C                                       "

## 2025-03-25 ENCOUNTER — OFFICE VISIT (OUTPATIENT)
Dept: CARDIOLOGY | Facility: CLINIC | Age: 55
End: 2025-03-25
Payer: COMMERCIAL

## 2025-03-25 VITALS
WEIGHT: 264 LBS | SYSTOLIC BLOOD PRESSURE: 102 MMHG | BODY MASS INDEX: 35.8 KG/M2 | RESPIRATION RATE: 14 BRPM | HEART RATE: 64 BPM | DIASTOLIC BLOOD PRESSURE: 54 MMHG

## 2025-03-25 DIAGNOSIS — I10 ESSENTIAL HYPERTENSION: ICD-10-CM

## 2025-03-25 DIAGNOSIS — I49.3 PVC'S (PREMATURE VENTRICULAR CONTRACTIONS): ICD-10-CM

## 2025-03-25 DIAGNOSIS — I25.111 CORONARY ARTERY DISEASE INVOLVING NATIVE CORONARY ARTERY OF NATIVE HEART WITH ANGINA PECTORIS WITH DOCUMENTED SPASM: Primary | ICD-10-CM

## 2025-03-25 DIAGNOSIS — I20.1 CORONARY ARTERY VASOSPASM: ICD-10-CM

## 2025-03-25 PROCEDURE — 99214 OFFICE O/P EST MOD 30 MIN: CPT | Performed by: STUDENT IN AN ORGANIZED HEALTH CARE EDUCATION/TRAINING PROGRAM

## 2025-03-25 PROCEDURE — 3078F DIAST BP <80 MM HG: CPT | Performed by: STUDENT IN AN ORGANIZED HEALTH CARE EDUCATION/TRAINING PROGRAM

## 2025-03-25 PROCEDURE — 3074F SYST BP LT 130 MM HG: CPT | Performed by: STUDENT IN AN ORGANIZED HEALTH CARE EDUCATION/TRAINING PROGRAM

## 2025-03-25 RX ORDER — TERAZOSIN 1 MG/1
1 CAPSULE ORAL AT BEDTIME
COMMUNITY
Start: 2025-01-21

## 2025-03-25 RX ORDER — OXYCODONE HYDROCHLORIDE 5 MG/1
5 TABLET ORAL EVERY 6 HOURS PRN
COMMUNITY
Start: 2025-02-24

## 2025-03-25 RX ORDER — ALBUTEROL SULFATE 90 UG/1
2 INHALANT RESPIRATORY (INHALATION) EVERY 6 HOURS PRN
COMMUNITY
Start: 2025-01-06

## 2025-03-25 RX ORDER — HYDROXYZINE HYDROCHLORIDE 25 MG/1
25 TABLET, FILM COATED ORAL EVERY 8 HOURS PRN
COMMUNITY
Start: 2025-01-21

## 2025-03-25 NOTE — LETTER
3/25/2025    Juanita Sorenson, CNP  [address] [facility] [address]    RE: Kemal K Michael       Dear Colleague,     I had the pleasure of seeing Kemal Rodriguez in the [facility].    HEART CARE ENCOUNTER NOTE      [facility]  [phone]      Assessment/Recommendations   Assessment:   Coronary artery disease: Coronary angiogram with acetylcholine challenge in 2016 showed vasospasm. Coronary angiogram 7/17/23 showed 15% stenosis to mid LAD and 10% in distal RCA.  Negative nuclear stress test 3/22/24.  Patient is on Ranexa 500 mg twice daily as well as isosorbide mononitrate 120 mg daily.  Notes his vasospasms have been well-controlled.  Hypertension: Well-controlled on spironolactone 25 mg daily, Imdur 120 mg daily, metoprolol 50 mg daily, amlodipine 10 mg daily.  BMP 2/22/2025 stable  Hyperlipidemia: Controlled on atorvastatin 40 mg daily.  Last lipids 7/17/23 showed LDL 52.  Patient will update this through PCP.  PVCs: <1% burden seen on Zio patch 10/2024 though did seem to corrlelate with symptoms.  Patient notes since switching to metoprolol his PVCs are completely gone.  Patient does have side effect with metoprolol of decreased sex drive.  Will trial cutting back metoprolol to 25 mg once daily, but if PVCs come back we will increase this again to twice daily.        Plan:   Continue current medications  Can trial cutting metoprolol succinate to 25 mg once daily instead of twice daily to see if this improves sex drive while still controlling PVCs  Patient will start monitoring blood pressure at home given the lower reading today and some mild lightheadedness      Follow up in 6 to 12 months with me or Dr. Arellano     History of Present Illness/Subjective    HPI: Kemal Rodriguez is a 54 year old male with PMHx of minimal CAD with vasospasm, HTN, palpitations presents for follow up. Patient last saw Dr. Arellano 9/16/24 for palpitations and skipped beat feeling  that was worsening. Patient underwent zio patch 10/2024 that showed <1% PVC burden but did seem to correlate with symptoms. Diltiazem was switched to metoprolol which improved symptoms.  I last saw patient 12/30/2024 where patient was noting a decrease in sex drive and we had tried splitting his metoprolol to 25 mg twice daily.  Patient's vasospasms have been well-controlled.  We had discussed stopping energy drinks and limiting caffeine as well as increasing fluids.    Patient presented to the ED 2/22/2025 with chest pain that started a day prior to arrival while at rest.  His nitroglycerin was ineffective that day for symptoms.  EKG without ischemia, normal troponin and D-dimer.  Had been recommended to start Prilosec 40 mg daily and follow-up with primary care.  Patient notes since starting Prilosec symptoms work on that night and have not recurred.  Patient notes it felt different than his vasospasms and does not think it was GERD.  Patient notes lightheadedness improved since splitting his metoprolol dose in the morning and evening.  However patient still notes decreased sex drive.  Patient notes his PVCs are well-controlled.  Patient started going to the gym and walking on a treadmill.  Notes his vasospasms are well-controlled unless he is pushing himself going up an incline.  Patient denies dyspnea, significant lower extremity edema.  Of note patient has hand surgery coming up tomorrow and another procedure with conscious sedation 2 days later.        Zio patch 10/2/24:  Indication for study: Ventricular premature beats  Time monitored: 4 days 12 hours.    Predominant rhythm: Normal sinus rhythm.  Conduction intervals are abnormal with QRS prolongation (IVCD).  No high degree AV block.  Patient recordings: Diary: None.  Triggered (no symptoms): 70, and recordings demonstrated sinus rhythm with rates ranging between 66 and 86 bpm.  The majority but not all recordings demonstrated isolated PVCs.  There were 5  recordings which demonstrated sinus rhythm alone.  Auto triggered recordings: recordings demonstrated sinus rhythm with overall rare ventricular ectopy.     Impression:  Abnormal multiday cardiac patch monitor by virtue of the presence of a intraventricular conduction delay.  There was no evidence of high degree AV block..  Triggered recordings likely correlate with ventricular ectopy however this was not universally the case.  The burden of ventricular ectopy was low (<1%).  No sustained atrial or ventricular tachyarrhythmia.  No profound bradycardia or significant/symptomatic pauses.    Nuclear stress test 3/22/24:     The nuclear stress test is negative for inducible myocardial ischemia or infarction.     The patient is at a low risk of future cardiac ischemic events.     The left ventricular ejection fraction at stress is greater than 70%.     The stress electrocardiogram is negative for inducible ischemic EKG changes.     A prior study was conducted on 1/14/2021.  Prior images were unavailable for comparison review, however by report there is no significant change.    Coronary angiogram 7/17/23:  Minimal coronary artery disease     Echocardiogram 7/17/23:  The left ventricle is normal in size.  There is mild concentric left ventricular hypertrophy.  The visual ejection fraction is 65-70%.  No regional wall motion abnormalities noted.  Normal right ventricle size and systolic function.  The right ventricular systolic pressure is approximated at 27mmHg plus the  right atrial pressure.  IVC diameter <2.1 cm collapsing >50% with sniff suggests a normal RA pressure  of 3 mmHg.  There is no comparison study available.     Physical Examination  Review of Systems   Vitals: /54 (BP Location: Left arm, Patient Position: Sitting, Cuff Size: Adult Large)   Pulse 64   Resp 14   Wt 119.7 kg (264 lb)   BMI 35.80 kg/m    BMI= Body mass index is 35.8 kg/m .  Wt Readings from Last 3 Encounters:   03/25/25 119.7 kg (264  lb)   12/30/24 120.7 kg (266 lb)   09/16/24 116.6 kg (257 lb)           ENT/Mouth: membranes moist, no oral lesions or bleeding gums.      EYES:  no scleral icterus, normal conjunctivae       Chest/Lungs:   lungs are clear to auscultation, no rales or wheezing, equal chest wall expansion    Cardiovascular:   Regular. Normal first and second heart sounds with no murmurs, rubs, or gallops; the carotid, radial and posterior tibial pulses are intact,  no edema bilaterally        Extremities: no cyanosis or clubbing   Skin: no xanthelasma, warm.    Neurologic: no tremors     Psychiatric: alert and oriented x3, calm        Please refer above for cardiac ROS details.        Medical History  Surgical History Family History Social History   Past Medical History:   Diagnosis Date     BPH (benign prostatic hyperplasia)      G6PD deficiency anemia      History of blood transfusion      Hypertension      Sleep apnea     uses cpap      Past Surgical History:   Procedure Laterality Date     CV CORONARY ANGIOGRAM N/A 7/17/2023    Procedure: Coronary Angiogram;  Surgeon: Gustavo Sultana MD;  Location: [facility] CV     CV LEFT HEART CATH N/A 7/17/2023    Procedure: Left Heart Catheterization;  Surgeon: Gustavo Sultana MD;  Location: [facility] CV     HERNIA REPAIR      x3 on R inguinal  side     LAPAROSCOPIC CHOLECYSTECTOMY N/A 12/18/2019    Procedure: CHOLECYSTECTOMY, LAPAROSCOPIC;  Surgeon: Therese Crow MD;  Location: [facility] OR     REPLACEMENT TOTAL KNEE Bilateral      No family history on file.     Social History     Socioeconomic History     Marital status:      Spouse name: Not on file     Number of children: Not on file     Years of education: Not on file     Highest education level: Not on file   Occupational History     Not on file   Tobacco Use     Smoking status: Never     Smokeless tobacco: Former     Types: Chew     Tobacco comments:     2 tins/day   Vaping Use     Vaping status: Never Used    Substance and Sexual Activity     Alcohol use: Never     Drug use: Never     Sexual activity: Not on file   Other Topics Concern     Parent/sibling w/ CABG, MI or angioplasty before 65F 55M? Not Asked   Social History Narrative     Not on file     Social Drivers of Health     Financial Resource Strain: Low Risk  (3/1/2023)    Received from [facility]    Overall Financial Resource Strain (CARDIA)      Difficulty of Paying Living Expenses: Not very hard   Food Insecurity: No Food Insecurity (3/1/2023)    Received from [facility]    Hunger Vital Sign      Worried About Running Out of Food in the Last Year: Never true      Ran Out of Food in the Last Year: Never true   Transportation Needs: No Transportation Needs (3/1/2023)    Received from [facility]    PRAPARE - Transportation      Lack of Transportation (Medical): No      Lack of Transportation (Non-Medical): No   Physical Activity: Insufficiently Active (3/1/2023)    Received from [facility]    Exercise Vital Sign      Days of Exercise per Week: 2 days      Minutes of Exercise per Session: 30 min   Stress: No Stress Concern Present (3/1/2023)    Received from [facility]    [employer] - Occupational Stress Questionnaire      Feeling of Stress : Not at all   Social Connections: Unknown (7/31/2024)    Received from [employer]    Social [employer]      Social Network: Not on file   Interpersonal Safety: Patient Declined (2/22/2025)    Received from [facility]    Humiliation, Afraid, Rape, and Kick questionnaire      Fear of Current or Ex-Partner: Patient declined      Emotionally Abused: Patient declined      Physically Abused: Patient declined      Sexually Abused: Patient declined   Housing Stability: Low Risk  (3/1/2023)    Received from [facility]    Housing Stability Vital Sign      Unable to Pay for Housing in the Last Year: No      Number of Places Lived in the Last Year: 1      In the last 12 months, was there a time  when you did not have a steady place to sleep or slept in a shelter (including now)?: No           Medications  Allergies   Current Outpatient Medications   Medication Sig Dispense Refill     albuterol (PROAIR HFA/PROVENTIL HFA/VENTOLIN HFA) 108 (90 Base) MCG/ACT inhaler Inhale 2 puffs into the lungs every 6 hours as needed.       amLODIPine (NORVASC) 10 MG tablet Take 10 mg by mouth daily       aspirin 81 MG EC tablet Take 81 mg by mouth At Bedtime       atorvastatin (LIPITOR) 40 MG tablet Take 40 mg by mouth At Bedtime        buPROPion (WELLBUTRIN XL) 300 MG 24 hr tablet Take 300 mg by mouth every morning       cyclobenzaprine (FLEXERIL) 10 MG tablet Take 10 mg by mouth 3 times daily as needed       hydrOXYzine HCl (ATARAX) 25 MG tablet Take 25 mg by mouth every 8 hours as needed.       isosorbide mononitrate CR (IMDUR) 120 MG 24 HR ER tablet Take 1 tablet (120 mg) by mouth daily 90 tablet 3     metoprolol succinate ER (TOPROL XL) 50 MG 24 hr tablet Take 0.5 tablets (25 mg) by mouth 2 times daily. 90 tablet 2     Multiple Vitamin (MULTI-VITAMINS) TABS Take 1 tablet by mouth daily        nicotine (NICORETTE) 4 MG lozenge Place 4 mg inside cheek every hour as needed       nitroGLYcerin (NITROSTAT) 0.4 MG sublingual tablet For chest pain place 1 tablet under the tongue every 5 minutes for 3 doses. If symptoms persist 5 minutes after 1st dose call 911. (Patient taking differently: 0.4 mg every 5 minutes as needed. For chest pain place 1 tablet under the tongue every 5 minutes for 3 doses. If symptoms persist 5 minutes after 1st dose call 911.) 25 tablet 4     omega 3 1000 MG CAPS Take 1 g by mouth 2 times daily        oxyCODONE (ROXICODONE) 5 MG tablet Take 5 mg by mouth every 6 hours as needed.       ranolazine (RANEXA) 500 MG 12 hr tablet Take 1 tablet (500 mg) by mouth 2 times daily 180 tablet 0     SODIUM FLUORIDE 5000 PLUS 1.1 % CREA APPLY A SMALL AMOUNT TO TOOHBRUSH AND BRUSH TEETH AT BEDTIME DO NOT DRINK OR EAT  "FOR 30 MINUTES AFTER USE       spironolactone (ALDACTONE) 25 MG tablet Take 1 tablet (25 mg) by mouth daily. 90 tablet 2     terazosin (HYTRIN) 1 MG capsule Take 1 mg by mouth at bedtime.       traZODone (DESYREL) 50 MG tablet Take 50 mg by mouth nightly as needed for sleep.       vitamin C (ASCORBIC ACID) 500 MG tablet Take 500 mg by mouth daily       vitamin D3 (CHOLECALCIFEROL) 50 mcg (2000 units) tablet Take 1 tablet by mouth daily       diclofenac (VOLTAREN) 1 % topical gel Apply 2 g topically as needed for moderate pain. (Patient not taking: Reported on 3/25/2025)         Allergies   Allergen Reactions     Carbamazepine      Other Reaction(s): Confusion     Quinine GI Disturbance and Nausea and Vomiting     Nausea & Vomiting       Aspirin      PN: LW Reaction: Blood disorder       Hydrocodone GI Disturbance     Pcn [Penicillins]      Sulfa Antibiotics           Lab Results    Chemistry/lipid CBC Cardiac Enzymes/BNP/TSH/INR   Recent Labs   Lab Test 07/17/23  0655   CHOL 102   HDL 29*   LDL 52   TRIG 106     Recent Labs   Lab Test 07/17/23  0655 01/14/21  0505   LDL 52 79     Recent Labs   Lab Test 07/11/23  1024      POTASSIUM 3.8   CHLORIDE 105   CO2 27      BUN 13   CR 0.92   GFRESTIMATED >90   DALE 9.3     Recent Labs   Lab Test 07/11/23  1024 01/13/21  2100 12/18/19  0620   CR 0.92 0.72 0.77     No results for input(s): \"A1C\" in the last 10586 hours.       Recent Labs   Lab Test 07/11/23  1024   WBC 8.9   HGB 12.3*   HCT 37.8*   *        Recent Labs   Lab Test 07/11/23  1024 01/14/21  0515 01/13/21  2100   HGB 12.3* 11.1* 11.3*    No results for input(s): \"TROPONINI\" in the last 81219 hours.  No results for input(s): \"BNP\", \"NTBNPI\", \"NTBNP\" in the last 85688 hours.  Recent Labs   Lab Test 01/14/21  0505   TSH 3.07     Recent Labs   Lab Test 01/13/21  2100   INR 1.04          Bhakti Bradley PA-C                                         Thank you for allowing me to participate in " the care of your patient.      Sincerely,     Bhakti Bradley PA-C     [facility]  cc:   Paul Dawkins MD  [address], [address]  [place] [address]

## 2025-03-25 NOTE — PATIENT INSTRUCTIONS
Kemal Rodriguez,    It was a pleasure to see you today at the [facility].     My recommendations after this visit include:    - Continue current medications  - Can try decreasing metoprolol to 25 mg once daily. If PVCs come back can do twice a day again.   - Follow up with Dr. Arellano in 6-12 months    - Please call [phone], if you have any questions or concerns      Bhakti Bradley PA-C

## (undated) DEVICE — ELECTRODE DEFIB CADENCE 22550R

## (undated) DEVICE — ENDO TROCAR FIRST ENTRY KII FIOS Z-THRD 05X100MM CTF03

## (undated) DEVICE — SU VICRYL 4-0 PS-2 18" UND J496H

## (undated) DEVICE — ENDO TROCAR SLEEVE KII Z-THREADED 05X100MM CTS02

## (undated) DEVICE — KIT HAND CONTROL ACIST 014644 AR-P54

## (undated) DEVICE — LINEN HALF SHEET 5512

## (undated) DEVICE — ENDO TROCAR BLUNT TIP KII BALLOON 12X100MM C0R47

## (undated) DEVICE — CATH DIAG RADIAL 5FR TIG 4.5 100CM

## (undated) DEVICE — GLOVE PROTEXIS BLUE W/NEU-THERA 7.0  2D73EB70

## (undated) DEVICE — MANIFOLD KIT ANGIO AUTOMATED 014613

## (undated) DEVICE — LINEN FULL SHEET 5511

## (undated) DEVICE — LINEN POUCH DBL 5427

## (undated) DEVICE — BAG CLEAR TRASH 1.3M 39X33" P4040C

## (undated) DEVICE — NDL 22GA 1.5"

## (undated) DEVICE — ESU CORD MONOPOLAR 10'  E0510

## (undated) DEVICE — BLADE KNIFE SURG 11 371111

## (undated) DEVICE — LINEN TOWEL PACK X5 5464

## (undated) DEVICE — GLOVE PROTEXIS MICRO 6.5  2D73PM65

## (undated) DEVICE — SOL NACL 0.9% IRRIG 1000ML BOTTLE 2F7124

## (undated) DEVICE — SYR ANGIOGRAPHY MULTIUSE KIT ACIST 014612

## (undated) DEVICE — Device

## (undated) DEVICE — CUSTOM PACK CORONARY SAN5BCRHEA

## (undated) DEVICE — ESU ELEC BLADE 2.75" COATED/INSULATED E1455

## (undated) DEVICE — CLIP APPLIER ENDO 5MM M/L LIGAMAX EL5ML

## (undated) DEVICE — SUCTION CANISTER MEDIVAC LINER 3000ML W/LID 65651-530

## (undated) DEVICE — SLEEVE TR BAND RADIAL COMPRESSION DEVICE 29CM XX-RF06L

## (undated) DEVICE — SU VICRYL 0 UR-6 27" J603H

## (undated) DEVICE — INTRO GLIDESHEATH SLENDER 6FR 10X45CM 60-1060

## (undated) DEVICE — ENDO POUCH UNIV RETRIEVAL SYSTEM INZII 10MM CD001

## (undated) DEVICE — ESU GROUND PAD ADULT W/CORD E7507

## (undated) DEVICE — ESU PENCIL W/HOLSTER E2350H

## (undated) RX ORDER — NEOSTIGMINE METHYLSULFATE 1 MG/ML
VIAL (ML) INJECTION
Status: DISPENSED
Start: 2019-12-18

## (undated) RX ORDER — FENTANYL CITRATE 50 UG/ML
INJECTION, SOLUTION INTRAMUSCULAR; INTRAVENOUS
Status: DISPENSED
Start: 2019-12-18

## (undated) RX ORDER — DIAZEPAM 10 MG
TABLET ORAL
Status: DISPENSED
Start: 2023-07-17

## (undated) RX ORDER — VERAPAMIL HYDROCHLORIDE 2.5 MG/ML
INJECTION, SOLUTION INTRAVENOUS
Status: DISPENSED
Start: 2023-07-17

## (undated) RX ORDER — ONDANSETRON 2 MG/ML
INJECTION INTRAMUSCULAR; INTRAVENOUS
Status: DISPENSED
Start: 2019-12-18

## (undated) RX ORDER — HYDROMORPHONE HYDROCHLORIDE 1 MG/ML
INJECTION, SOLUTION INTRAMUSCULAR; INTRAVENOUS; SUBCUTANEOUS
Status: DISPENSED
Start: 2019-12-18

## (undated) RX ORDER — HYDROCODONE BITARTRATE AND ACETAMINOPHEN 5; 325 MG/1; MG/1
TABLET ORAL
Status: DISPENSED
Start: 2019-12-18

## (undated) RX ORDER — PHENYLEPHRINE HCL IN 0.9% NACL 1 MG/10 ML
SYRINGE (ML) INTRAVENOUS
Status: DISPENSED
Start: 2019-12-18

## (undated) RX ORDER — FENTANYL CITRATE 50 UG/ML
INJECTION, SOLUTION INTRAMUSCULAR; INTRAVENOUS
Status: DISPENSED
Start: 2023-07-17

## (undated) RX ORDER — CLINDAMYCIN PHOSPHATE 900 MG/50ML
INJECTION, SOLUTION INTRAVENOUS
Status: DISPENSED
Start: 2019-12-18

## (undated) RX ORDER — BUPIVACAINE HYDROCHLORIDE AND EPINEPHRINE 2.5; 5 MG/ML; UG/ML
INJECTION, SOLUTION EPIDURAL; INFILTRATION; INTRACAUDAL; PERINEURAL
Status: DISPENSED
Start: 2019-12-18

## (undated) RX ORDER — PROPOFOL 10 MG/ML
INJECTION, EMULSION INTRAVENOUS
Status: DISPENSED
Start: 2019-12-18

## (undated) RX ORDER — GLYCOPYRROLATE 0.2 MG/ML
INJECTION INTRAMUSCULAR; INTRAVENOUS
Status: DISPENSED
Start: 2019-12-18

## (undated) RX ORDER — DEXAMETHASONE SODIUM PHOSPHATE 4 MG/ML
INJECTION, SOLUTION INTRA-ARTICULAR; INTRALESIONAL; INTRAMUSCULAR; INTRAVENOUS; SOFT TISSUE
Status: DISPENSED
Start: 2019-12-18

## (undated) RX ORDER — EPHEDRINE SULFATE 50 MG/ML
INJECTION, SOLUTION INTRAMUSCULAR; INTRAVENOUS; SUBCUTANEOUS
Status: DISPENSED
Start: 2019-12-18